# Patient Record
Sex: FEMALE | Race: OTHER | NOT HISPANIC OR LATINO | Employment: OTHER | URBAN - METROPOLITAN AREA
[De-identification: names, ages, dates, MRNs, and addresses within clinical notes are randomized per-mention and may not be internally consistent; named-entity substitution may affect disease eponyms.]

---

## 2019-02-12 ENCOUNTER — OFFICE VISIT (OUTPATIENT)
Dept: URGENT CARE | Facility: CLINIC | Age: 64
End: 2019-02-12
Payer: COMMERCIAL

## 2019-02-12 ENCOUNTER — APPOINTMENT (OUTPATIENT)
Dept: RADIOLOGY | Facility: IMAGING CENTER | Age: 64
End: 2019-02-12
Attending: FAMILY MEDICINE
Payer: COMMERCIAL

## 2019-02-12 VITALS
RESPIRATION RATE: 16 BRPM | BODY MASS INDEX: 27.6 KG/M2 | HEART RATE: 72 BPM | WEIGHT: 150 LBS | SYSTOLIC BLOOD PRESSURE: 132 MMHG | HEIGHT: 62 IN | DIASTOLIC BLOOD PRESSURE: 88 MMHG | OXYGEN SATURATION: 98 % | TEMPERATURE: 97.7 F

## 2019-02-12 DIAGNOSIS — T14.8XXA WOUND OF SKIN: ICD-10-CM

## 2019-02-12 PROCEDURE — 99214 OFFICE O/P EST MOD 30 MIN: CPT | Performed by: FAMILY MEDICINE

## 2019-02-12 PROCEDURE — 73140 X-RAY EXAM OF FINGER(S): CPT | Mod: TC,FY,RT | Performed by: FAMILY MEDICINE

## 2019-02-12 RX ORDER — DOXYCYCLINE HYCLATE 100 MG
100 TABLET ORAL EVERY 12 HOURS
Qty: 10 TAB | Refills: 0 | Status: SHIPPED | OUTPATIENT
Start: 2019-02-12 | End: 2019-02-17

## 2019-02-12 ASSESSMENT — ENCOUNTER SYMPTOMS
FOCAL WEAKNESS: 0
DIZZINESS: 0
FALLS: 0
CHILLS: 0
SPUTUM PRODUCTION: 0
COUGH: 0
SENSORY CHANGE: 1
FEVER: 0

## 2019-02-13 ENCOUNTER — TELEPHONE (OUTPATIENT)
Dept: HEALTH INFORMATION MANAGEMENT | Facility: OTHER | Age: 64
End: 2019-02-13

## 2019-02-13 NOTE — PROGRESS NOTES
"Subjective:      Funmilayo Egan is a 63 y.o. female who presents with Finger Injury (Today (L) ring finger/ smashed finger in door)      - This is a very pleasant, well and non-toxic appearing 63 y.o. female with complaints of after putting some groceries into her back seat of car she closed door and accidentally slammed her Rt middle finger. ~20 min ago           ALLERGIES:  Pcn [penicillins]     PMH:  Past Medical History:   Diagnosis Date   • Asthma     remote hx   • COPD     mild, not on meds for it   • Osteoporosis         PSH:  Past Surgical History:   Procedure Laterality Date   • ABDOMINAL HYSTERECTOMY TOTAL     • APPENDECTOMY         MEDS:    Current Outpatient Prescriptions:   •  doxycycline (VIBRAMYCIN) 100 MG Tab, Take 1 Tab by mouth every 12 hours for 5 days., Disp: 10 Tab, Rfl: 0  •  Risedronate Sodium (ACTONEL PO), Take 1 Tab by mouth every Monday., Disp: , Rfl:     ** I have documented what I find to be significant in regards to past medical, social, family and surgical history  in my HPI or under PMH/PSH/FH review section, otherwise it is contributory **           HPI    Review of Systems   Constitutional: Negative for chills and fever.   Respiratory: Negative for cough and sputum production.    Musculoskeletal: Positive for joint pain ( Rt ring finger ). Negative for falls.   Skin:        Wound Rt ring finger     Neurological: Positive for sensory change ( pain). Negative for dizziness and focal weakness.          Objective:     /88 (BP Location: Left arm, Patient Position: Sitting, BP Cuff Size: Adult)   Pulse 72   Temp 36.5 °C (97.7 °F) (Temporal)   Resp 16   Ht 1.575 m (5' 2\")   Wt 68 kg (150 lb)   SpO2 98%   BMI 27.44 kg/m²      Physical Exam   Constitutional: She appears well-developed. No distress.   HENT:   Head: Normocephalic and atraumatic.   Cardiovascular: Regular rhythm.    No murmur heard.  Pulmonary/Chest: Effort normal. No respiratory distress.   Musculoskeletal: Normal " range of motion. She exhibits edema, tenderness and deformity.   Rt ring finger: distal aspect w/ edema and bruising to fat pad. Superficial short lac over dorsal aspect. + TTP. Good flex/ext strength.    Neurological: She is alert. She exhibits normal muscle tone.   Skin: Skin is warm.   Psychiatric: She has a normal mood and affect. Judgment normal.   Nursing note and vitals reviewed.              Assessment/Plan:         1. Wound of skin  DX-FINGER(S) 2+ RIGHT    doxycycline (VIBRAMYCIN) 100 MG Tab       - RICE  - wound cleaned/dressed  - wound care discussed  - splinted  - 2 day wound check here       Dx & d/c instructions discussed w/ patient and/or family members.     ER precautions (worsening signs symptoms and when to go to ER) discussed.    Follow up w/ PCP in 2-3 days to make sure symptoms improving and no further intervention/treatment and/or work-up needed was advised, ER if feeling worse or not improving in 2 days.    Possible side effects (i.e. Rash, GI upset/constipation, sedation, elevation of BP or sugars) of any medications given discussed.     Patient left in stable condition

## 2019-02-14 ENCOUNTER — OFFICE VISIT (OUTPATIENT)
Dept: URGENT CARE | Facility: CLINIC | Age: 64
End: 2019-02-14
Payer: COMMERCIAL

## 2019-02-14 VITALS
DIASTOLIC BLOOD PRESSURE: 70 MMHG | OXYGEN SATURATION: 98 % | RESPIRATION RATE: 20 BRPM | HEART RATE: 78 BPM | SYSTOLIC BLOOD PRESSURE: 112 MMHG | TEMPERATURE: 98.2 F

## 2019-02-14 DIAGNOSIS — Z51.89 ENCOUNTER FOR WOUND RE-CHECK: ICD-10-CM

## 2019-02-14 PROCEDURE — 99024 POSTOP FOLLOW-UP VISIT: CPT | Performed by: NURSE PRACTITIONER

## 2019-02-15 NOTE — PROGRESS NOTES
Follow up laceration to the right ring finger from 2 days. Denies fever, chills, nausea, vomiting, diarrhea. Has been icing area. Has been tolerating antibiotic well.     Right ring finger with bruising and swelling. Decreased ROM.   Small healing laceration below nail bed.     Cleansed in office and applied Polysporin and bandaid. Finger splint given in office and discussed wound care instructions.     Continue antibiotic and follow up PRN.

## 2019-02-23 ENCOUNTER — OFFICE VISIT (OUTPATIENT)
Dept: URGENT CARE | Facility: CLINIC | Age: 64
End: 2019-02-23
Payer: COMMERCIAL

## 2019-02-23 VITALS
HEIGHT: 62 IN | WEIGHT: 151 LBS | TEMPERATURE: 98.6 F | HEART RATE: 84 BPM | DIASTOLIC BLOOD PRESSURE: 74 MMHG | RESPIRATION RATE: 16 BRPM | SYSTOLIC BLOOD PRESSURE: 124 MMHG | OXYGEN SATURATION: 98 % | BODY MASS INDEX: 27.79 KG/M2

## 2019-02-23 DIAGNOSIS — M79.644 PAIN OF FINGER OF RIGHT HAND: ICD-10-CM

## 2019-02-23 DIAGNOSIS — S61.214A LACERATION OF RIGHT RING FINGER WITHOUT FOREIGN BODY, NAIL DAMAGE STATUS UNSPECIFIED, INITIAL ENCOUNTER: Primary | ICD-10-CM

## 2019-02-23 DIAGNOSIS — S60.10XA SUBUNGUAL HEMATOMA OF FINGER, INITIAL ENCOUNTER: ICD-10-CM

## 2019-02-23 PROCEDURE — 99213 OFFICE O/P EST LOW 20 MIN: CPT | Performed by: NURSE PRACTITIONER

## 2019-02-23 ASSESSMENT — LIFESTYLE VARIABLES: SUBSTANCE_ABUSE: 0

## 2019-02-23 ASSESSMENT — ENCOUNTER SYMPTOMS
FEVER: 0
NAUSEA: 0
DIZZINESS: 0
CHILLS: 0

## 2019-02-24 NOTE — PROGRESS NOTES
"Subjective:      Funmilayo Egan is a 63 y.o. female who presents with Finger Pain (with hx of injury)    Denies past medical, surgical or family history that is significant to today's problem.   RX or OTC medications-- reviewed with patient today.   Allergies   Allergen Reactions   • Pcn [Penicillins]              HPI This is a new problem. Was seen in urgent care on 02/12 /19 for finger contusion  she has been using salt directly on the wound, washing it with hydrogen peroxide and putting  methialate  on her wound.     Review of Systems   Constitutional: Negative for chills, fever and malaise/fatigue.   Gastrointestinal: Negative for nausea.   Neurological: Negative for dizziness.   Endo/Heme/Allergies: Negative for environmental allergies.   Psychiatric/Behavioral: Negative for substance abuse.          Objective:     /74 (BP Location: Right arm, Patient Position: Sitting)   Pulse 84   Temp 37 °C (98.6 °F) (Temporal)   Resp 16   Ht 1.575 m (5' 2\")   Wt 68.5 kg (151 lb)   SpO2 98%   BMI 27.62 kg/m²      Physical Exam   Constitutional: She is oriented to person, place, and time. She appears well-developed and well-nourished.   HENT:   Head: Normocephalic.   Neck: Normal range of motion.   Pulmonary/Chest: Effort normal.   Musculoskeletal: Normal range of motion.        Hands:  Neurological: She is alert and oriented to person, place, and time.   Skin: Skin is warm. Capillary refill takes less than 2 seconds.   Psychiatric: She has a normal mood and affect. Her behavior is normal. Thought content normal.          Reviewed xray from 02/12/19 by me.     2/12/2019 7:14 PM    HISTORY/REASON FOR EXAM:  Right 4th digit pain..  .    TECHNIQUE/EXAM DESCRIPTION AND NUMBER OF VIEWS:  3 views of the RIGHT fingers.    COMPARISON: None    FINDINGS:  There is no evidence of acute fracture, dislocation, or radiopaque foreign body.    There is minimal degenerative change in the 3rd and 5th DIP joint. PIP joints and MCP " joints are maintained. Radiocarpal joint is maintained.   Impression       1.  Negative imaging of the right 4th digit.   Reading Provider Reading Date   Taylor Thomas M.D. Feb 12, 2019          Assessment/Plan:     1. Laceration of right ring finger without foreign body, nail damage status unspecified, initial encounter     2. Subungual hematoma of finger, initial encounter      Improving      3. Pain of finger of right hand           Epsom salt soaks. BID for 10 min.   Continue OTC triple antibiotic ointment w. bandaid  OTC age appropriate, analgesic of choice. Follow manufactures dosing and safety precautions.   FU prn    The patient is alerted to watch for any signs of infection (redness, pus, pain, increased swelling or fever) and call if such occurs. Home wound care instructions are provided. }.

## 2019-03-12 ENCOUNTER — OFFICE VISIT (OUTPATIENT)
Dept: MEDICAL GROUP | Facility: MEDICAL CENTER | Age: 64
End: 2019-03-12
Payer: COMMERCIAL

## 2019-03-12 VITALS
DIASTOLIC BLOOD PRESSURE: 84 MMHG | HEART RATE: 77 BPM | SYSTOLIC BLOOD PRESSURE: 126 MMHG | TEMPERATURE: 97.6 F | OXYGEN SATURATION: 97 % | BODY MASS INDEX: 27.62 KG/M2 | HEIGHT: 62 IN

## 2019-03-12 DIAGNOSIS — R07.89 CHEST PRESSURE: ICD-10-CM

## 2019-03-12 DIAGNOSIS — I10 ESSENTIAL HYPERTENSION: ICD-10-CM

## 2019-03-12 DIAGNOSIS — M81.0 AGE-RELATED OSTEOPOROSIS WITHOUT CURRENT PATHOLOGICAL FRACTURE: ICD-10-CM

## 2019-03-12 DIAGNOSIS — R53.83 OTHER FATIGUE: ICD-10-CM

## 2019-03-12 DIAGNOSIS — F41.9 ANXIETY: ICD-10-CM

## 2019-03-12 DIAGNOSIS — E78.00 PURE HYPERCHOLESTEROLEMIA: ICD-10-CM

## 2019-03-12 DIAGNOSIS — F63.3 HAIR PULLING: ICD-10-CM

## 2019-03-12 PROCEDURE — 99214 OFFICE O/P EST MOD 30 MIN: CPT | Performed by: FAMILY MEDICINE

## 2019-03-12 RX ORDER — RALOXIFENE HYDROCHLORIDE 60 MG/1
60 TABLET, FILM COATED ORAL DAILY
COMMUNITY
End: 2019-03-12 | Stop reason: SDUPTHER

## 2019-03-12 RX ORDER — ATENOLOL 100 MG/1
100 TABLET ORAL DAILY
COMMUNITY
End: 2019-03-12

## 2019-03-12 RX ORDER — TURMERIC 100 %
1 POWDER (GRAM) MISCELLANEOUS DAILY
COMMUNITY
End: 2022-12-29

## 2019-03-12 RX ORDER — ATENOLOL AND CHLORTHALIDONE TABLET 100; 25 MG/1; MG/1
1 TABLET ORAL DAILY
Qty: 90 TAB | Refills: 3 | Status: SHIPPED | OUTPATIENT
Start: 2019-03-12 | End: 2019-04-11

## 2019-03-12 RX ORDER — RALOXIFENE HYDROCHLORIDE 60 MG/1
60 TABLET, FILM COATED ORAL DAILY
Qty: 90 TAB | Refills: 3 | Status: SHIPPED | OUTPATIENT
Start: 2019-03-12 | End: 2020-05-18

## 2019-03-12 RX ORDER — ATENOLOL AND CHLORTHALIDONE TABLET 100; 25 MG/1; MG/1
1 TABLET ORAL DAILY
COMMUNITY
Start: 2019-03-12 | End: 2019-03-12 | Stop reason: SDUPTHER

## 2019-03-12 RX ORDER — CHOLECALCIFEROL (VITAMIN D3) 125 MCG
5000 CAPSULE ORAL DAILY
COMMUNITY
End: 2022-09-28

## 2019-03-12 ASSESSMENT — PATIENT HEALTH QUESTIONNAIRE - PHQ9: CLINICAL INTERPRETATION OF PHQ2 SCORE: 0

## 2019-03-12 NOTE — ASSESSMENT & PLAN NOTE
This is a long standing problem for this patient. zoloft seems to be helping. Significant improvement compared to prior.

## 2019-03-12 NOTE — ASSESSMENT & PLAN NOTE
This occurred 2 weeks ago. She states it was associated with palpitations, upper back pain and SOB. 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 She felt worried it was her heart but did not get seen by a doctor. She continued to mild dizziness the next day. She does state that she has been feeling stressed out lately. She has strong family history of heart disease. Mother: age 50's MI. Brother: heart disease, 50. The patient is very active (works as ) she sometimes feel SOB when working, but has never had chest pain or chest pain other than the one day.     Gets chest pressure a couple times per week at rest, takes her 's nitro which she thinks helps.

## 2019-03-12 NOTE — ASSESSMENT & PLAN NOTE
Currently on atenolol/chlorthalidone. No side effects  Last bun/cr and potassim done 12/2018: Cr: 0.83, K: 3.9, BUn: 23

## 2019-03-12 NOTE — ASSESSMENT & PLAN NOTE
Currently on raloxifene. Has been on this for 2 yrs. Last bone denisity study was last year at [facility].

## 2019-03-14 NOTE — PROGRESS NOTES
Subjective:     CC:  Diagnoses of Anxiety, Hair pulling, Essential hypertension, Age-related osteoporosis without current pathological fracture, Other fatigue, Chest pressure, and Pure hypercholesterolemia were pertinent to this visit.    HISTORY OF THE PRESENT ILLNESS: Patient is a 63 y.o. female who presents to the clinic today to establish care.    Hair pulling  This is a long standing problem for this patient. zoloft seems to be helping. Significant improvement compared to prior.  Still has some hair pulling from time to time.    Essential hypertension  Currently on atenolol/chlorthalidone. No side effects  Last bun/cr and potassim done 12/2018: Cr: 0.83, K: 3.9, BUN: 23  Blood pressure well controlled.    Age-related osteoporosis without current pathological fracture  Currently on raloxifene. Has been on this for 2 yrs. Last bone denisity study was last year at [facility] (the patient thinks).     Other fatigue  Feels fatigued for the past few weeks.     Chest pressure  This occurred 2 weeks ago. She states it was associated with palpitations, upper back pain and SOB. 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 She felt worried it was her heart but did not get seen by a doctor. She continued to have mild dizziness the next day. She does state that she has been feeling stressed out lately. She has a strong family history of heart disease. Mother: age 50's MI. Brother: heart disease, 50. The patient is very active (works as ) she sometimes feel SOB when working, but has never had chest pain or palpitations other than the one day while working.    During physical exam the patient confided that she gets chest pressure a couple times per week at rest, takes her 's nitro which she thinks helps.  She states that she has never told another physician in this.  This is been going on for months.      Allergies: Pcn [penicillins]    Current Outpatient Prescriptions Ordered in  "[facility]   Medication Sig Dispense Refill   • Turmeric Powder by Does not apply route.     • vitamin D (CHOLECALCIFEROL) 1000 UNIT Tab Take 1,000 Units by mouth every day.     • sertraline (ZOLOFT) 50 MG Tab Take 1 Tab by mouth every day. 90 Tab 3   • raloxifene (EVISTA) 60 MG Tab Take 1 Tab by mouth every day. 90 Tab 3   • atenolol-chlorthalidone (TENORETIC) 100-25 MG per tablet Take 1 Tab by mouth every day. 90 Tab 3     No current Epic-ordered facility-administered medications on file.        Past Medical History:   Diagnosis Date   • ASTHMA     remote hx   • COPD     mild, not on meds for it   • Hypertension    • OSTEOPOROSIS        Past Surgical History:   Procedure Laterality Date   • ABDOMINAL HYSTERECTOMY TOTAL     • APPENDECTOMY         Social History   Substance Use Topics   • Smoking status: Former Smoker   • Smokeless tobacco: Never Used      Comment: 20 y.a   • Alcohol use No       Social History     Social History Narrative   • No narrative on file       Family History   Problem Relation Age of Onset   • No Known Problems Mother    • Cancer Father        ROS:   Gen: no fevers/chills, no changes in weight  Eyes: no changes in vision  ENT: no sore throat, no hearing loss, no bloody nose  Pulm: no sob, no cough  CV: no chest pain, no palpitations  GI: no nausea/vomiting, no diarrhea  : no dysuria  MSk: no myalgias  Skin: no rash  Neuro: no headaches, no numbness/tingling  Heme/Lymph: no easy bruising      Objective:     Exam: Blood pressure 126/84, pulse 77, temperature 36.4 °C (97.6 °F), temperature source Temporal, height 1.575 m (5' 2\"), SpO2 97 %, not currently breastfeeding. Body mass index is 27.62 kg/m².    General: Normal appearing. No distress.  HEENT: conjunctiva clear, lids without ptosis, pupils equal  and reactive to light, ears normal shape and contour, canals are clear bilaterally, TMs clear, oropharynx is without erythema, edema or exudates.   Neck: Supple without masses. Thyroid is not " enlarged.  Pulmonary: Clear to ausculation.  Normal effort. No rales, ronchi, or wheezing.  Cardiovascular: Regular rate and rhythm, no murmur. No LE edema  Abdomen: Soft, nontender, nondistended. No masses or hernias noted.  Neurologic: Grossly normal, no focal deficits  Lymph: No cervical or supraclavicular lymph nodes are palpable  Skin: Warm and dry.  No obvious lesions.  Musculoskeletal: Normal gait and station.  Psych: Normal mood and affect. Alert and oriented x3. Judgment and insight is normal.      Labs: Reviewed labs from 12/20/2018    Assessment & Plan:   63 y.o. female with the following -    1. Anxiety  Chronic problem, somewhat well-controlled.  Currently on sertraline 50 mg daily.  Refill was placed for this today.  - sertraline (ZOLOFT) 50 MG Tab; Take 1 Tab by mouth every day.  Dispense: 90 Tab; Refill: 3    2. Hair pulling  Chronic problem, somewhat well-controlled.  Still having some hair pulling but significant improvement since starting the Zoloft.  Refill placed today.  - sertraline (ZOLOFT) 50 MG Tab; Take 1 Tab by mouth every day.  Dispense: 90 Tab; Refill: 3    3. Essential hypertension  Chronic problem this patient, well-controlled.  Currently on atenolol/chlorthalidone.  Refill placed for this today.  Recent BUN/creatinine 12/28/2018.  - atenolol-chlorthalidone (TENORETIC) 100-25 MG per tablet; Take 1 Tab by mouth every day.  Dispense: 90 Tab; Refill: 3    4. Age-related osteoporosis without current pathological fracture  Chronic problem, currently on raloxifene.  Refill placed for that today.  Patient is likely due for DEXA.  We have requested her prior DEXA scan.  - raloxifene (EVISTA) 60 MG Tab; Take 1 Tab by mouth every day.  Dispense: 90 Tab; Refill: 3    5. Other fatigue    - CBC WITH DIFFERENTIAL; Future  - Comp Metabolic Panel; Future  - TSH WITH REFLEX TO FT4; Future    6. Chest pressure  New problem.  Possibly due to anxiety however, concern for angina given that she is taking  her 's nitro and does find some relief with this.  EKG in office showed normal sinus rhythm today.  Referral to cardiology urgently as well as stress test ordered today.  She was strongly precautioned that if she has any further chest pain to present to the ER immediately.  - Cardiac Stress Test Treadmill Only; Future  - REFERRAL TO CARDIOLOGY    7. Pure hypercholesterolemia  Chronic problem.  Most recent lipid panel did show total cholesterol 256, to glycerides 183 and LDL of 157.  HDL was 62.  She should likely be on a statin, however, the patient would like to hold off for now.  We will rediscuss this at her next visit.    Return in about 4 weeks (around 4/9/2019).    Please note that this dictation was created using voice recognition software. I have made every reasonable attempt to correct obvious errors, but I expect that there are errors of grammar and possibly content that I did not discover before finalizing the note.

## 2019-04-08 ENCOUNTER — NON-PROVIDER VISIT (OUTPATIENT)
Dept: CARDIOLOGY | Facility: MEDICAL CENTER | Age: 64
End: 2019-04-08
Attending: FAMILY MEDICINE
Payer: COMMERCIAL

## 2019-04-08 VITALS
HEIGHT: 62 IN | DIASTOLIC BLOOD PRESSURE: 84 MMHG | SYSTOLIC BLOOD PRESSURE: 136 MMHG | OXYGEN SATURATION: 99 % | HEART RATE: 67 BPM | BODY MASS INDEX: 27.79 KG/M2 | WEIGHT: 151 LBS

## 2019-04-08 DIAGNOSIS — R07.89 CHEST PRESSURE: ICD-10-CM

## 2019-04-08 LAB — TREADMILL STRESS: NORMAL

## 2019-04-08 PROCEDURE — 93015 CV STRESS TEST SUPVJ I&R: CPT | Performed by: INTERNAL MEDICINE

## 2019-04-11 ENCOUNTER — OFFICE VISIT (OUTPATIENT)
Dept: CARDIOLOGY | Facility: MEDICAL CENTER | Age: 64
End: 2019-04-11
Payer: COMMERCIAL

## 2019-04-11 VITALS
BODY MASS INDEX: 26.13 KG/M2 | SYSTOLIC BLOOD PRESSURE: 122 MMHG | OXYGEN SATURATION: 94 % | DIASTOLIC BLOOD PRESSURE: 82 MMHG | HEIGHT: 62 IN | WEIGHT: 142 LBS | HEART RATE: 68 BPM

## 2019-04-11 DIAGNOSIS — R06.02 SHORTNESS OF BREATH: ICD-10-CM

## 2019-04-11 DIAGNOSIS — R00.2 PALPITATIONS: ICD-10-CM

## 2019-04-11 DIAGNOSIS — E78.5 DYSLIPIDEMIA: ICD-10-CM

## 2019-04-11 DIAGNOSIS — I10 ESSENTIAL HYPERTENSION: ICD-10-CM

## 2019-04-11 DIAGNOSIS — R07.89 CHEST DISCOMFORT: ICD-10-CM

## 2019-04-11 PROCEDURE — 99204 OFFICE O/P NEW MOD 45 MIN: CPT | Performed by: INTERNAL MEDICINE

## 2019-04-11 RX ORDER — METOPROLOL SUCCINATE 100 MG/1
100 TABLET, EXTENDED RELEASE ORAL DAILY
Qty: 30 TAB | Refills: 11 | Status: SHIPPED | OUTPATIENT
Start: 2019-04-11 | End: 2020-02-13

## 2019-04-11 RX ORDER — ZOSTER VACCINE RECOMBINANT, ADJUVANTED 50 MCG/0.5
KIT INTRAMUSCULAR
COMMUNITY
Start: 2019-04-10 | End: 2020-04-02

## 2019-04-11 ASSESSMENT — ENCOUNTER SYMPTOMS
DIAPHORESIS: 1
PALPITATIONS: 1
ABDOMINAL PAIN: 0
CHILLS: 0
DIZZINESS: 1
VOMITING: 0
EYE REDNESS: 1
BACK PAIN: 1
MYALGIAS: 0
NAUSEA: 1
BRUISES/BLEEDS EASILY: 1
SHORTNESS OF BREATH: 1
BLURRED VISION: 0
HEARTBURN: 1
NECK PAIN: 1
INSOMNIA: 1
HEADACHES: 1
DEPRESSION: 0
ORTHOPNEA: 1
FEVER: 0
LOSS OF CONSCIOUSNESS: 0
HEMOPTYSIS: 0
NERVOUS/ANXIOUS: 0
WEAKNESS: 1
WHEEZING: 0
EYE DISCHARGE: 0
EYE PAIN: 0
COUGH: 0
SPEECH CHANGE: 0

## 2019-04-11 NOTE — PROGRESS NOTES
Chief Complaint   Patient presents with   • Chest Pressure       Subjective:   Funmilayo Egan is a 63 y.o. female who presents today for new patient evaluation because of dyspnea, chest discomfort and palpitations.  She also has a history of hypertension and dyslipidemia.    Over the last year she is noted significant difficulty with dyspnea.  This is generally associated with some chest discomfort.  She feels the need to take a deep breath and this is associated with some chest tightness.  Is also associated with some lightheadedness.  She has any symptoms on a daily basis in the last hours.  They occur with and without exertion.  She also feels like she has to yawn.  She is noted no tingling.  She has some associated palpitations intermittently.    She has no dyspnea on exertion, PND, orthopnea, or edema    Past Medical History:   Diagnosis Date   • ASTHMA     remote hx   • COPD     mild, not on meds for it   • Hyperlipidemia    • Hypertension    • OSTEOPOROSIS      Past Surgical History:   Procedure Laterality Date   • ABDOMINAL HYSTERECTOMY TOTAL     • APPENDECTOMY       Family History   Problem Relation Age of Onset   • Heart Disease Mother 65   • Cancer Father    • Heart Disease Brother      Social History     Social History   • Marital status:      Spouse name: N/A   • Number of children: N/A   • Years of education: N/A     Occupational History   • Not on file.     Social History Main Topics   • Smoking status: Former Smoker   • Smokeless tobacco: Never Used      Comment: 20 y.a   • Alcohol use 2.4 oz/week     4 Glasses of wine per week   • Drug use: No   • Sexual activity: No     Other Topics Concern   • Not on file     Social History Narrative   • No narrative on file     Allergies   Allergen Reactions   • Pcn [Penicillins]      Outpatient Encounter Prescriptions as of 4/11/2019   Medication Sig Dispense Refill   • Turmeric Powder by Does not apply route.     • vitamin D (CHOLECALCIFEROL) 1000 UNIT  "Tab Take 1,000 Units by mouth every day.     • sertraline (ZOLOFT) 50 MG Tab Take 1 Tab by mouth every day. 90 Tab 3   • raloxifene (EVISTA) 60 MG Tab Take 1 Tab by mouth every day. 90 Tab 3   • atenolol-chlorthalidone (TENORETIC) 100-25 MG per tablet Take 1 Tab by mouth every day. 90 Tab 3   • SHINGRIX 50 MCG/0.5ML Recon Susp        No facility-administered encounter medications on file as of 4/11/2019.      Review of Systems   Constitutional: Positive for diaphoresis and malaise/fatigue. Negative for chills and fever.   HENT: Negative for congestion and ear discharge.    Eyes: Positive for redness. Negative for blurred vision, pain and discharge.   Respiratory: Positive for shortness of breath. Negative for cough, hemoptysis and wheezing.    Cardiovascular: Positive for chest pain, palpitations and orthopnea.   Gastrointestinal: Positive for heartburn and nausea. Negative for abdominal pain and vomiting.   Musculoskeletal: Positive for back pain and neck pain. Negative for joint pain and myalgias.   Skin: Negative for itching and rash.   Neurological: Positive for dizziness, weakness and headaches. Negative for speech change and loss of consciousness.   Endo/Heme/Allergies: Bruises/bleeds easily.   Psychiatric/Behavioral: Negative for depression. The patient has insomnia. The patient is not nervous/anxious.    All other systems reviewed and are negative.       Objective:   /82 (BP Location: Left arm, Patient Position: Sitting, BP Cuff Size: Adult)   Pulse 68   Ht 1.575 m (5' 2\")   Wt 64.4 kg (142 lb)   SpO2 94%   BMI 25.97 kg/m²     Physical Exam   Constitutional: She is oriented to person, place, and time. She appears well-developed and well-nourished. No distress.   HENT:   Head: Normocephalic and atraumatic.   Mouth/Throat: Mucous membranes are normal.   Neck: No JVD present. No thyromegaly present.   Cardiovascular: Normal rate, regular rhythm and intact distal pulses.  Exam reveals no gallop.    No " murmur heard.  Pulmonary/Chest: Effort normal and breath sounds normal. She has no rales.   Abdominal: Soft. There is no splenomegaly or hepatomegaly.   Musculoskeletal: Normal range of motion. She exhibits no edema.   Lymphadenopathy:     She has no cervical adenopathy.   Neurological: She is alert and oriented to person, place, and time. She has normal strength. She exhibits normal muscle tone.   Skin: Skin is warm and dry. No rash noted.   Psychiatric: She has a normal mood and affect. Her behavior is normal.     Laboratory from December 2018: Total cholesterol 256, triglycerides 183, HDL 62 and .  BUN 23 and creatinine 0.83    EKG from March 12, reviewed by myself: This shows a normal sinus rhythm with low voltage in the precordial leads only.  Is otherwise unremarkable.    Exercise tolerance test:  4/8/2019 4:43 PM    Resting EKG showed sinus rhythm with early transition but otherwise normal.  There was no ischemic changes on EKG during exercise or in recovery.    Provider conclusions and analysis:    Negative EKG stress test for ischemia at 10 minutes.  Normal exercise capacity.      Assessment:     1. Shortness of breath     2. Essential hypertension     3. Dyslipidemia     4. Chest discomfort     5. Palpitations         Medical Decision Making:  Today's Assessment / Status / Plan:     Ms. Egan is having symptoms which are consistent with anxiety/hyperventilation syndrome.  However, she does have increased cardiac risk factors and she will be risk stratified with a cardiac CT scan for calcium scoring.  We will also obtain an echocardiogram and change her atenolol to metoprolol  mg daily.  We will check a TSH and she will follow-up in about a month.

## 2019-04-11 NOTE — LETTER
[facility]   [address]   [address]  [place] [address]  Phone: [phone]  Fax: [phone]              Funmilayo Egan  1955    Encounter Date: 4/11/2019    Kyle Sarmiento M.D.          PROGRESS NOTE:  Chief Complaint   Patient presents with   • Chest Pressure       Subjective:   Funmilayo Egan is a 63 y.o. female who presents today for new patient evaluation because of dyspnea, chest discomfort and palpitations.  She also has a history of hypertension and dyslipidemia.    Over the last year she is noted significant difficulty with dyspnea.  This is generally associated with some chest discomfort.  She feels the need to take a deep breath and this is associated with some chest tightness.  Is also associated with some lightheadedness.  She has any symptoms on a daily basis in the last hours.  They occur with and without exertion.  She also feels like she has to yawn.  She is noted no tingling.  She has some associated palpitations intermittently.    She has no dyspnea on exertion, PND, orthopnea, or edema    Past Medical History:   Diagnosis Date   • ASTHMA     remote hx   • COPD     mild, not on meds for it   • Hyperlipidemia    • Hypertension    • OSTEOPOROSIS      Past Surgical History:   Procedure Laterality Date   • ABDOMINAL HYSTERECTOMY TOTAL     • APPENDECTOMY       Family History   Problem Relation Age of Onset   • Heart Disease Mother 65   • Cancer Father    • Heart Disease Brother      Social History     Social History   • Marital status:      Spouse name: N/A   • Number of children: N/A   • Years of education: N/A     Occupational History   • Not on file.     Social History Main Topics   • Smoking status: Former Smoker   • Smokeless tobacco: Never Used      Comment: 20 y.a   • Alcohol use 2.4 oz/week     4 Glasses of wine per week   • Drug use: No   • Sexual activity: No     Other Topics Concern   • Not on file     Social  "History Narrative   • No narrative on file     Allergies   Allergen Reactions   • Pcn [Penicillins]      Outpatient Encounter Prescriptions as of 4/11/2019   Medication Sig Dispense Refill   • Turmeric Powder by Does not apply route.     • vitamin D (CHOLECALCIFEROL) 1000 UNIT Tab Take 1,000 Units by mouth every day.     • sertraline (ZOLOFT) 50 MG Tab Take 1 Tab by mouth every day. 90 Tab 3   • raloxifene (EVISTA) 60 MG Tab Take 1 Tab by mouth every day. 90 Tab 3   • atenolol-chlorthalidone (TENORETIC) 100-25 MG per tablet Take 1 Tab by mouth every day. 90 Tab 3   • SHINGRIX 50 MCG/0.5ML Recon Susp        No facility-administered encounter medications on file as of 4/11/2019.      Review of Systems   Constitutional: Positive for diaphoresis and malaise/fatigue. Negative for chills and fever.   HENT: Negative for congestion and ear discharge.    Eyes: Positive for redness. Negative for blurred vision, pain and discharge.   Respiratory: Positive for shortness of breath. Negative for cough, hemoptysis and wheezing.    Cardiovascular: Positive for chest pain, palpitations and orthopnea.   Gastrointestinal: Positive for heartburn and nausea. Negative for abdominal pain and vomiting.   Musculoskeletal: Positive for back pain and neck pain. Negative for joint pain and myalgias.   Skin: Negative for itching and rash.   Neurological: Positive for dizziness, weakness and headaches. Negative for speech change and loss of consciousness.   Endo/Heme/Allergies: Bruises/bleeds easily.   Psychiatric/Behavioral: Negative for depression. The patient has insomnia. The patient is not nervous/anxious.    All other systems reviewed and are negative.       Objective:   /82 (BP Location: Left arm, Patient Position: Sitting, BP Cuff Size: Adult)   Pulse 68   Ht 1.575 m (5' 2\")   Wt 64.4 kg (142 lb)   SpO2 94%   BMI 25.97 kg/m²      Physical Exam   Constitutional: She is oriented to person, place, and time. She appears " well-developed and well-nourished. No distress.   HENT:   Head: Normocephalic and atraumatic.   Mouth/Throat: Mucous membranes are normal.   Neck: No JVD present. No thyromegaly present.   Cardiovascular: Normal rate, regular rhythm and intact distal pulses.  Exam reveals no gallop.    No murmur heard.  Pulmonary/Chest: Effort normal and breath sounds normal. She has no rales.   Abdominal: Soft. There is no splenomegaly or hepatomegaly.   Musculoskeletal: Normal range of motion. She exhibits no edema.   Lymphadenopathy:     She has no cervical adenopathy.   Neurological: She is alert and oriented to person, place, and time. She has normal strength. She exhibits normal muscle tone.   Skin: Skin is warm and dry. No rash noted.   Psychiatric: She has a normal mood and affect. Her behavior is normal.     Laboratory from December 2018: Total cholesterol 256, triglycerides 183, HDL 62 and .  BUN 23 and creatinine 0.83    EKG from March 12, reviewed by myself: This shows a normal sinus rhythm with low voltage in the precordial leads only.  Is otherwise unremarkable.    Exercise tolerance test:  4/8/2019 4:43 PM    Resting EKG showed sinus rhythm with early transition but otherwise normal.  There was no ischemic changes on EKG during exercise or in recovery.    Provider conclusions and analysis:    Negative EKG stress test for ischemia at 10 minutes.  Normal exercise capacity.      Assessment:     1. Shortness of breath     2. Essential hypertension     3. Dyslipidemia     4. Chest discomfort     5. Palpitations         Medical Decision Making:  Today's Assessment / Status / Plan:     Ms. Egan is having symptoms which are consistent with anxiety/hyperventilation syndrome.  However, she does have increased cardiac risk factors and she will be risk stratified with a cardiac CT scan for calcium scoring.  We will also obtain an echocardiogram and change her atenolol to metoprolol  mg daily.  We will check a TSH  and she will follow-up in about a month.      Marija Baker M.D.  [address]  [address]  [facility] [address]  VIA In Basket

## 2019-04-12 ENCOUNTER — OFFICE VISIT (OUTPATIENT)
Dept: MEDICAL GROUP | Facility: MEDICAL CENTER | Age: 64
End: 2019-04-12
Payer: COMMERCIAL

## 2019-04-12 VITALS
BODY MASS INDEX: 25.84 KG/M2 | DIASTOLIC BLOOD PRESSURE: 84 MMHG | HEIGHT: 62 IN | WEIGHT: 140.43 LBS | SYSTOLIC BLOOD PRESSURE: 138 MMHG | HEART RATE: 89 BPM | TEMPERATURE: 98 F | OXYGEN SATURATION: 95 %

## 2019-04-12 DIAGNOSIS — R07.89 CHEST DISCOMFORT: ICD-10-CM

## 2019-04-12 DIAGNOSIS — N39.41 URGE INCONTINENCE OF URINE: ICD-10-CM

## 2019-04-12 PROCEDURE — 99214 OFFICE O/P EST MOD 30 MIN: CPT | Performed by: FAMILY MEDICINE

## 2019-04-12 NOTE — ASSESSMENT & PLAN NOTE
This has been going on for about 1-1.5 yrs. Denies any painful urination. Denies any significant frequency. She does feel like she is emptying her bladder completely. She does not do any kegel exercises. Has not been sexually active for about 10 yrs with her .

## 2019-04-13 NOTE — PROGRESS NOTES
Subjective:     CC: Diagnoses of Urge incontinence of urine and Chest discomfort were pertinent to this visit.    HPI: Patient is a 63 y.o. female established patient who presents today to follow-up on chest pain as well as concern for incontinence.      Urge incontinence of urine  This has been going on for about 1-1.5 yrs. Denies any painful urination. Denies any significant frequency. She does feel like she is emptying her bladder completely. She does not do any kegel exercises. Has not been sexually active for about 10 yrs with her .  She states the problem happens daily, she reports that she has no warning, she will have a slight urge to urinate and then often is unable to get to the bathroom in time.    Chest discomfort  Treadmill stress test was negative.  The patient still has an echocardiogram ordered.  Her EKG with cardiology looked okay.  They did switch her blood pressure medication to metoprolol.      Past Medical History:   Diagnosis Date   • ASTHMA     remote hx   • COPD     mild, not on meds for it   • Hyperlipidemia    • Hypertension    • OSTEOPOROSIS        Social History   Substance Use Topics   • Smoking status: Former Smoker   • Smokeless tobacco: Never Used      Comment: 20 y.a   • Alcohol use 2.4 oz/week     4 Glasses of wine per week       Current Outpatient Prescriptions Ordered in [facility]   Medication Sig Dispense Refill   • metoprolol SR (TOPROL XL) 100 MG TABLET SR 24 HR Take 1 Tab by mouth every day. 30 Tab 11   • Turmeric Powder by Does not apply route.     • vitamin D (CHOLECALCIFEROL) 1000 UNIT Tab Take 1,000 Units by mouth every day.     • sertraline (ZOLOFT) 50 MG Tab Take 1 Tab by mouth every day. 90 Tab 3   • raloxifene (EVISTA) 60 MG Tab Take 1 Tab by mouth every day. 90 Tab 3   • SHINGRIX 50 MCG/0.5ML Recon Susp        No current Epic-ordered facility-administered medications on file.        Allergies:  Pcn [penicillins]    Health Maintenance: Completed    ROS:  Gen: no  "fevers/chill, no changes in weight  Eyes: no changes in vision  ENT: no sore throat, no hearing loss, no bloody nose  Pulm: no sob, no cough  CV: no chest pain, no palpitations  GI: no nausea/vomiting, no diarrhea  : no dysuria  MSk: no myalgias  Skin: no rash  Neuro: no headaches, no numbness/tingling  Heme/Lymph: no easy bruising      Objective:       Exam:  /84 (BP Location: Left arm, Patient Position: Sitting, BP Cuff Size: Adult)   Pulse 89   Temp 36.7 °C (98 °F) (Temporal)   Ht 1.575 m (5' 2\")   Wt 63.7 kg (140 lb 6.9 oz)   SpO2 95%   BMI 25.69 kg/m²  Body mass index is 25.69 kg/m².    General: Normal appearing. No distress.  HEAD: NCAT  EYES: conjunctiva clear, lids without ptosis, pupils equal and reactive to light  EARS: ears normal shape and contour.  MOUTH: normal dentition   Neck:  Normal ROM  Pulmonary: Normal effort. Normal respiratory rate.  Cardiovascular: Well perfused. No LE edema  Neurologic: Grossly normal, no focal deficits  Skin: Warm and dry.  No obvious lesions.  Musculoskeletal: Normal gait and station.   Psych: Normal mood and affect. Alert and oriented x3. Judgment and insight is normal. EXAM    Assessment & Plan:     63 y.o. female with the following -     1. Urge incontinence of urine  New problem.  Sounds like urge incontinence on history.  I did order a urinalysis to ensure that she has no infection.  I discussed with the patient some preventive measures such as voiding every 2-3 hours scheduled, avoiding coffee.  We also discussed Keagle exercises.  She is interested in pelvic floor physical therapy.  I sent a referral for this.  - URINALYSIS,CULTURE IF INDICATED; Future  - REFERRAL TO PHYSICAL THERAPY Reason for Therapy: Eval/Treat/Report    2. Chest discomfort  Chronic problem, well-controlled.  The patient is being seen by cardiology.  She still has an echocardiogram scheduled, and she plans to get this done.  I also strongly urged her to get her labs done that we had " ordered at her panel, CBC and thyroid.  She states that she will get this done.  I did print the lab order for her again today.      Please note that this dictation was created using voice recognition software. I have made every reasonable attempt to correct obvious errors, but I expect that there are errors of grammar and possibly content that I did not discover before finalizing the note.

## 2019-04-15 ENCOUNTER — HOSPITAL ENCOUNTER (OUTPATIENT)
Dept: RADIOLOGY | Facility: MEDICAL CENTER | Age: 64
End: 2019-04-15
Attending: FAMILY MEDICINE
Payer: COMMERCIAL

## 2019-04-15 DIAGNOSIS — Z12.31 SCREENING MAMMOGRAM, ENCOUNTER FOR: ICD-10-CM

## 2019-04-15 PROCEDURE — 77063 BREAST TOMOSYNTHESIS BI: CPT

## 2019-04-16 LAB
ALBUMIN SERPL-MCNC: 4.2 G/DL (ref 3.6–4.8)
ALBUMIN/GLOB SERPL: 2.2 {RATIO} (ref 1.2–2.2)
ALP SERPL-CCNC: 35 IU/L (ref 39–117)
ALT SERPL-CCNC: 21 IU/L (ref 0–32)
AST SERPL-CCNC: 17 IU/L (ref 0–40)
BASOPHILS # BLD AUTO: 0 X10E3/UL (ref 0–0.2)
BASOPHILS NFR BLD AUTO: 0 %
BILIRUB SERPL-MCNC: 0.3 MG/DL (ref 0–1.2)
BUN SERPL-MCNC: 17 MG/DL (ref 8–27)
BUN/CREAT SERPL: 24 (ref 12–28)
CALCIUM SERPL-MCNC: 9.5 MG/DL (ref 8.7–10.3)
CHLORIDE SERPL-SCNC: 106 MMOL/L (ref 96–106)
CO2 SERPL-SCNC: 26 MMOL/L (ref 20–29)
CREAT SERPL-MCNC: 0.7 MG/DL (ref 0.57–1)
EOSINOPHIL # BLD AUTO: 0.2 X10E3/UL (ref 0–0.4)
EOSINOPHIL NFR BLD AUTO: 3 %
ERYTHROCYTE [DISTWIDTH] IN BLOOD BY AUTOMATED COUNT: 13.5 % (ref 12.3–15.4)
GLOBULIN SER CALC-MCNC: 1.9 G/DL (ref 1.5–4.5)
GLUCOSE SERPL-MCNC: 101 MG/DL (ref 65–99)
HCT VFR BLD AUTO: 42.8 % (ref 34–46.6)
HGB BLD-MCNC: 14.6 G/DL (ref 11.1–15.9)
IMM GRANULOCYTES # BLD AUTO: 0 X10E3/UL (ref 0–0.1)
IMM GRANULOCYTES NFR BLD AUTO: 0 %
IMMATURE CELLS  115398: NORMAL
LYMPHOCYTES # BLD AUTO: 1.9 X10E3/UL (ref 0.7–3.1)
LYMPHOCYTES NFR BLD AUTO: 41 %
MCH RBC QN AUTO: 31.2 PG (ref 26.6–33)
MCHC RBC AUTO-ENTMCNC: 34.1 G/DL (ref 31.5–35.7)
MCV RBC AUTO: 92 FL (ref 79–97)
MONOCYTES # BLD AUTO: 0.6 X10E3/UL (ref 0.1–0.9)
MONOCYTES NFR BLD AUTO: 12 %
MORPHOLOGY BLD-IMP: NORMAL
NEUTROPHILS # BLD AUTO: 2.1 X10E3/UL (ref 1.4–7)
NEUTROPHILS NFR BLD AUTO: 44 %
NRBC BLD AUTO-RTO: NORMAL %
PLATELET # BLD AUTO: 220 X10E3/UL (ref 150–379)
POTASSIUM SERPL-SCNC: 4.4 MMOL/L (ref 3.5–5.2)
PROT SERPL-MCNC: 6.1 G/DL (ref 6–8.5)
RBC # BLD AUTO: 4.68 X10E6/UL (ref 3.77–5.28)
SODIUM SERPL-SCNC: 144 MMOL/L (ref 134–144)
TSH SERPL DL<=0.005 MIU/L-ACNC: 2.72 UIU/ML (ref 0.45–4.5)
WBC # BLD AUTO: 4.8 X10E3/UL (ref 3.4–10.8)

## 2019-04-17 ENCOUNTER — HOSPITAL ENCOUNTER (OUTPATIENT)
Dept: RADIOLOGY | Facility: MEDICAL CENTER | Age: 64
End: 2019-04-17

## 2019-04-17 ENCOUNTER — OFFICE VISIT (OUTPATIENT)
Dept: URGENT CARE | Facility: MEDICAL CENTER | Age: 64
End: 2019-04-17
Payer: COMMERCIAL

## 2019-04-17 VITALS
HEIGHT: 62 IN | RESPIRATION RATE: 16 BRPM | HEART RATE: 86 BPM | TEMPERATURE: 97.6 F | OXYGEN SATURATION: 96 % | BODY MASS INDEX: 25.76 KG/M2 | SYSTOLIC BLOOD PRESSURE: 132 MMHG | DIASTOLIC BLOOD PRESSURE: 80 MMHG | WEIGHT: 140 LBS

## 2019-04-17 DIAGNOSIS — L08.9 INFECTED FINGER: ICD-10-CM

## 2019-04-17 PROCEDURE — 99214 OFFICE O/P EST MOD 30 MIN: CPT | Performed by: PHYSICIAN ASSISTANT

## 2019-04-17 RX ORDER — SULFAMETHOXAZOLE AND TRIMETHOPRIM 800; 160 MG/1; MG/1
1 TABLET ORAL 2 TIMES DAILY
Qty: 14 TAB | Refills: 0 | Status: SHIPPED | OUTPATIENT
Start: 2019-04-17 | End: 2019-04-24

## 2019-04-20 ENCOUNTER — OFFICE VISIT (OUTPATIENT)
Dept: URGENT CARE | Facility: MEDICAL CENTER | Age: 64
End: 2019-04-20
Payer: COMMERCIAL

## 2019-04-20 VITALS
HEIGHT: 62 IN | BODY MASS INDEX: 26.68 KG/M2 | OXYGEN SATURATION: 96 % | HEART RATE: 79 BPM | SYSTOLIC BLOOD PRESSURE: 118 MMHG | WEIGHT: 145 LBS | DIASTOLIC BLOOD PRESSURE: 74 MMHG | TEMPERATURE: 97.5 F

## 2019-04-20 DIAGNOSIS — L03.011 CELLULITIS OF FINGER OF RIGHT HAND: ICD-10-CM

## 2019-04-20 PROCEDURE — 99213 OFFICE O/P EST LOW 20 MIN: CPT | Performed by: NURSE PRACTITIONER

## 2019-04-20 ASSESSMENT — ENCOUNTER SYMPTOMS
CHILLS: 0
FEVER: 0

## 2019-04-21 NOTE — PROGRESS NOTES
Subjective:      Funmilayo Egan is a 63 y.o. female who presents with Finger Pain (x2 weeks R ring finger injury; possible infection)    Past Medical History:   Diagnosis Date   • ASTHMA     remote hx   • COPD     mild, not on meds for it   • Hyperlipidemia    • Hypertension    • OSTEOPOROSIS      Social History     Social History   • Marital status:      Spouse name: N/A   • Number of children: N/A   • Years of education: N/A     Occupational History   • Not on file.     Social History Main Topics   • Smoking status: Former Smoker   • Smokeless tobacco: Never Used      Comment: 20 y.a   • Alcohol use 2.4 oz/week     4 Glasses of wine per week   • Drug use: No   • Sexual activity: No     Other Topics Concern   • Not on file     Social History Narrative   • No narrative on file     Family History   Problem Relation Age of Onset   • Heart Disease Mother 65   • Cancer Father    • Heart Disease Brother        Allergies: Pcn [penicillins]    Patient is a 63-year-old female who presents today for complaint of pain to the right ring finger that radiates into the hand and arm.  She was seen in office earlier this week for cellulitis.  Her initial injury occurred with this on February 12.  X-rays were negative.  No further injuries, but patient believed that she was developing cellulitis and came in to be seen.  She was placed on Bactrim DS which she is tolerating well.  She states that overall her finger does look much better, but she is concerned because she is having some pain into the wrist and forearm area.  No known fevers.          Other   This is a new problem. The current episode started in the past 7 days. The problem occurs constantly. Pertinent negatives include no chills or fever. Nothing aggravates the symptoms. Treatments tried: bactrim DS. The treatment provided moderate relief.       Review of Systems   Constitutional: Positive for malaise/fatigue. Negative for chills and fever.   Musculoskeletal:    "  Pain in the right ring finger radiating into the hand and arm   All other systems reviewed and are negative.         Objective:     /74   Pulse 79   Temp 36.4 °C (97.5 °F) (Temporal)   Ht 1.575 m (5' 2\")   Wt 65.8 kg (145 lb)   SpO2 96%   BMI 26.52 kg/m²      Physical Exam   Constitutional: She appears well-developed and well-nourished.   Musculoskeletal:        Arms:  There is slight amount of redness over the distal dorsal aspect of the right ring finger.  No streaking.  No obvious soft tissue swelling.  No pain along the path of the tendon either dorsally or on the palmar aspect.  No pain along the path of the tendon on the forearm.  There is no lymphadenopathy or pain in the right axilla.     Skin: Skin is warm and dry. Capillary refill takes less than 2 seconds.   Psychiatric: She has a normal mood and affect. Her behavior is normal. Judgment and thought content normal.   Vitals reviewed.      Consideration of IM rocephin made here in the office; however patient is highly allergic to penicillin and states that a prior allergic reaction had required hospitalization. As such giving rocephin may also risk an allergic reaction and it was not given.  At this time, I do not see any symptoms suggestive of sepsis.  There is no streaking or lymphadenitis.  Vital signs are within normal limits.  At this time, since the initial injury occurred on February 12, and patient feels that this is never completely resolved, I will refer her to orthopedics for follow-up.  I will also order a CBC to check patient's white blood cell count.  I have also given the patient strict precautions to go to the emergency room if pain increases, if she begins to have redness or streaking, axillary pain, or fever at any time.  Patient verbalized understanding and agreement with plan of care.            Assessment/Plan:   Celluliits, finger    Continue present antibiotic  Referral to orthopedics given-  CBC; will call " results  Strict ER precautions for worsening of symptoms; see note above.   There are no diagnoses linked to this encounter.

## 2019-04-23 ASSESSMENT — ENCOUNTER SYMPTOMS
PALPITATIONS: 0
CHILLS: 0
SHORTNESS OF BREATH: 0
TINGLING: 0
SORE THROAT: 0
NAUSEA: 0
VOMITING: 0
FEVER: 0
BLURRED VISION: 0
SENSORY CHANGE: 0

## 2019-04-23 NOTE — PROGRESS NOTES
Subjective:      Funmilayo Egan is a 63 y.o. female who presents with Wound Infection ((R) hand ring finger ingection, x 2 months, pain shoots up hand and arm )      HPI:  Funmilayo Egan is a 63 y.o. female who presents today for evaluation of a right finger infection. Patient was initially seen and evaluated for this back in February after slamming a car door on her right ring finger.  At that time she had an x-ray done which is unremarked was placed on a course of doxycycline.  She said that everything cleared up but has not been bothering her until just a few days ago when she started to notice pain and redness of her right ring finger again.  She denies any new injury.  She denies fever/chills or numbness/tingling.        Review of Systems   Constitutional: Negative for chills and fever.   HENT: Negative for sore throat.    Eyes: Negative for blurred vision.   Respiratory: Negative for shortness of breath.    Cardiovascular: Negative for chest pain and palpitations.   Gastrointestinal: Negative for nausea and vomiting.   Musculoskeletal: Positive for joint pain (Pain of right ring finger).   Neurological: Negative for tingling and sensory change.       PMH:  has a past medical history of ASTHMA; COPD; Hyperlipidemia; Hypertension; and OSTEOPOROSIS.  MEDS:   Current Outpatient Prescriptions:   •  sulfamethoxazole-trimethoprim (BACTRIM DS) 800-160 MG tablet, Take 1 Tab by mouth 2 times a day for 7 days., Disp: 14 Tab, Rfl: 0  •  SHINGRIX 50 MCG/0.5ML Recon Susp, , Disp: , Rfl:   •  metoprolol SR (TOPROL XL) 100 MG TABLET SR 24 HR, Take 1 Tab by mouth every day., Disp: 30 Tab, Rfl: 11  •  Turmeric Powder, by Does not apply route., Disp: , Rfl:   •  vitamin D (CHOLECALCIFEROL) 1000 UNIT Tab, Take 1,000 Units by mouth every day., Disp: , Rfl:   •  sertraline (ZOLOFT) 50 MG Tab, Take 1 Tab by mouth every day., Disp: 90 Tab, Rfl: 3  •  raloxifene (EVISTA) 60 MG Tab, Take 1 Tab by mouth every day., Disp: 90 Tab, Rfl:  "3  ALLERGIES:   Allergies   Allergen Reactions   • Pcn [Penicillins]      SURGHX:   Past Surgical History:   Procedure Laterality Date   • ABDOMINAL HYSTERECTOMY TOTAL     • APPENDECTOMY       SOCHX:  reports that she has quit smoking. She has never used smokeless tobacco. She reports that she drinks about 2.4 oz of alcohol per week . She reports that she does not use drugs.  FH: Family history was reviewed, no pertinent findings to report     Objective:     /80   Pulse 86   Temp 36.4 °C (97.6 °F)   Resp 16   Ht 1.575 m (5' 2\")   Wt 63.5 kg (140 lb)   SpO2 96%   BMI 25.61 kg/m²      Physical Exam   Constitutional: She is oriented to person, place, and time. She appears well-developed and well-nourished.   HENT:   Head: Normocephalic and atraumatic.   Right Ear: External ear normal.   Left Ear: External ear normal.   Eyes: Pupils are equal, round, and reactive to light. Conjunctivae are normal.   Cardiovascular: Normal rate, regular rhythm and normal heart sounds.    No murmur heard.  Pulmonary/Chest: Effort normal and breath sounds normal. She has no wheezes.   Musculoskeletal:        Hands:  Neurological: She is alert and oriented to person, place, and time.   Skin: Skin is warm and dry. Capillary refill takes less than 2 seconds.   Psychiatric: She has a normal mood and affect. Her behavior is normal. Judgment normal.          Assessment/Plan:     1. Infected finger  - sulfamethoxazole-trimethoprim (BACTRIM DS) 800-160 MG tablet; Take 1 Tab by mouth 2 times a day for 7 days.  Dispense: 14 Tab; Refill: 0  -Right ring finger appears mildly infected.  Application on a course of Bactrim.  Advised her to follow-up with us in 2 to 3 days for wound check to make sure that it is resolving appropriately.        Differential Diagnosis, natural history, and supportive care discussed. Return to the Urgent Care or follow up with your PCP if symptoms fail to resolve, or for any new or worsening symptoms. Emergency " room precautions discussed. Patient and/or family appears understanding of information.

## 2019-04-26 ENCOUNTER — OFFICE VISIT (OUTPATIENT)
Dept: MEDICAL GROUP | Facility: MEDICAL CENTER | Age: 64
End: 2019-04-26
Payer: COMMERCIAL

## 2019-04-26 ENCOUNTER — HOSPITAL ENCOUNTER (OUTPATIENT)
Facility: MEDICAL CENTER | Age: 64
End: 2019-04-26
Attending: FAMILY MEDICINE
Payer: COMMERCIAL

## 2019-04-26 VITALS
DIASTOLIC BLOOD PRESSURE: 64 MMHG | SYSTOLIC BLOOD PRESSURE: 116 MMHG | HEART RATE: 72 BPM | BODY MASS INDEX: 26.69 KG/M2 | OXYGEN SATURATION: 96 % | HEIGHT: 62 IN | TEMPERATURE: 97.6 F | WEIGHT: 145.06 LBS

## 2019-04-26 DIAGNOSIS — E78.5 DYSLIPIDEMIA: ICD-10-CM

## 2019-04-26 DIAGNOSIS — Z00.00 WELL ADULT EXAM: ICD-10-CM

## 2019-04-26 PROCEDURE — 88175 CYTOPATH C/V AUTO FLUID REDO: CPT

## 2019-04-26 PROCEDURE — 87624 HPV HI-RISK TYP POOLED RSLT: CPT

## 2019-04-26 PROCEDURE — 99396 PREV VISIT EST AGE 40-64: CPT | Performed by: FAMILY MEDICINE

## 2019-04-26 NOTE — PROGRESS NOTES
CC:   Chief Complaint   Patient presents with   • Annual Exam       HPI:   Funmilayo Egan is a 63 y.o. female who presents for annual exam. She is feeling well and denies any complaints.    Ob-Gyn/ History:    Patient has GYN provider: no  /Para:  0/0  Last Pap Smear:  3 yrs ago. No history of abnormal pap smears.  Gyn Surgery:  Yes, hysterectomy 30 yrs ago, unsure if cervix is still present.  Last menstrual period:  30 yrs ago.    No significant bloating/fluid retention, pelvic pain, or dyspareunia. No vaginal discharge  Post-menopausal bleeding: No  Urinary incontinence: yes, improved as she is scheduling her urination.  She is not sexually active, her  is ill.    Health Maintenance  Cholesterol Screening: about 6 months ago, does not have records.    Diabetes Screenin/15/19, fasting glucose 101   Aspirin Use: No    Diet: Good   Exercise: Regularly   Substance Abuse: N/a     Sun protection used.    Cancer screening  Colorectal Cancer Screening: last colonoscopy 1 yr ago, told to repeat in 3 yrs   Lung Cancer Screening: non-smoker    Cervical Cancer Screening: Last pap about 3 yrs ago, unclear if patient has cervix.    Breast Cancer Screening: up to date on mamm, last mammo 4/15/19    Infectious disease screening/Immunizations  --HIV Screening: never   --Hepatitis C Screening: never   --Immunizations:    Influenza: up to date    Tetanus: up to date    Shingles: up to date      She  has a past medical history of ASTHMA; COPD; Hyperlipidemia; Hypertension; and OSTEOPOROSIS.  She  has a past surgical history that includes abdominal hysterectomy total and appendectomy.    Family History   Problem Relation Age of Onset   • Heart Disease Mother 65   • Cancer Father    • Heart Disease Brother        Social History     Social History   • Marital status:      Spouse name: N/A   • Number of children: N/A   • Years of education: N/A     Occupational History   • Not on file.     Social History Main  "Topics   • Smoking status: Former Smoker   • Smokeless tobacco: Never Used      Comment: 20 y.a   • Alcohol use 2.4 oz/week     4 Glasses of wine per week   • Drug use: No   • Sexual activity: No     Other Topics Concern   • Not on file     Social History Narrative   • No narrative on file       Patient Active Problem List    Diagnosis Date Noted   • Urge incontinence of urine 04/12/2019   • Shortness of breath 04/11/2019   • Palpitations 04/11/2019   • Anxiety 03/12/2019   • Hair pulling 03/12/2019   • Essential hypertension 03/12/2019   • Age-related osteoporosis without current pathological fracture 03/12/2019   • Other fatigue 03/12/2019   • Chest discomfort 03/12/2019   • Dyslipidemia 03/12/2019         Current Outpatient Prescriptions   Medication Sig Dispense Refill   • metoprolol SR (TOPROL XL) 100 MG TABLET SR 24 HR Take 1 Tab by mouth every day. 30 Tab 11   • Turmeric Powder by Does not apply route.     • vitamin D (CHOLECALCIFEROL) 1000 UNIT Tab Take 1,000 Units by mouth every day.     • sertraline (ZOLOFT) 50 MG Tab Take 1 Tab by mouth every day. 90 Tab 3   • raloxifene (EVISTA) 60 MG Tab Take 1 Tab by mouth every day. 90 Tab 3   • SHINGRIX 50 MCG/0.5ML Recon Susp        No current facility-administered medications for this visit.      Allergies   Allergen Reactions   • Pcn [Penicillins]          Objective:     /64 (BP Location: Left arm, Patient Position: Sitting, BP Cuff Size: Adult)   Pulse 72   Temp 36.4 °C (97.6 °F) (Temporal)   Ht 1.575 m (5' 2\")   Wt 65.8 kg (145 lb 1 oz)   SpO2 96%   BMI 26.53 kg/m²   Body mass index is 26.53 kg/m².  Wt Readings from Last 4 Encounters:   04/26/19 65.8 kg (145 lb 1 oz)   04/20/19 65.8 kg (145 lb)   04/17/19 63.5 kg (140 lb)   04/12/19 63.7 kg (140 lb 6.9 oz)       Physical Exam:  Constitutional: Well-developed and well-nourished. Not diaphoretic. No distress.   Skin: Skin is warm and dry. No rash noted.  Eyes: Conjunctivae and extraocular motions are " normal. YUNG. No scleral icterus.   Ears:  External ears unremarkable. Tympanic membranes and canals clear and intact.  Nose: Nares patent. No discharge.   Mouth/Throat: Dentition is normal. Tongue normal. Oropharynx is clear and moist, without erythema or exudates.  Neck: Supple, normal range of motion. No thyromegaly present. No lymphadenopathy--cervical or supraclavicular.  Cardiovascular: Regular rate and rhythm, S1 and S2 without murmur, rubs, or gallops. No LE edema.    Lungs: Normal inspiratory effort, CTA bilaterally, no wheezes/rhonchi/rales  Breasts: Bilaterally symmetrical, no nipple discharge, no skin changes or dimpling are  noted.  Bilateral well-healed breast reduction scars.  Both breasts are free of palpable pathology and the axillae are free of lymphadenopathy.  Abdomen: Soft, non tender, and without distention. No rebound or guarding. No hernia noted.  Pelvic exam:  Perineum: No external lesions are noted, color is symmetrical throughout  Vagina: Vaginal vault is tight, friable, absent rugation.  Cervix: No cervix is visible.    Uterus:Surgically Absent  Bimanual: no adnexal masses or tenderness    Pap was performed and sent to the lab  Musculoskeletal: Normal gait and station.  Neurological: Alert and oriented x 3, no focal deficits noted.   Psychiatric:  Behavior, mood, and affect are appropriate.    Assessment and Plan:     1. Well adult exam  HEP C VIRUS ANTIBODY   2. Dyslipidemia  Lipid Profile       HCM:     Labs per orders  Up-to-date on immunizations  Patient counseled about skin care, diet, supplements, and exercise.    Follow-up: Return in about 1 year (around 4/26/2020).    Please note that this dictation was created using voice recognition software. I have made every reasonable attempt to correct obvious errors, but I expect that there are errors of grammar and possibly content that I did not discover before finalizing the note.

## 2019-04-27 DIAGNOSIS — Z00.00 WELL ADULT EXAM: ICD-10-CM

## 2019-04-28 ENCOUNTER — HOSPITAL ENCOUNTER (OUTPATIENT)
Dept: RADIOLOGY | Facility: MEDICAL CENTER | Age: 64
End: 2019-04-28
Attending: INTERNAL MEDICINE

## 2019-04-28 DIAGNOSIS — I10 ESSENTIAL HYPERTENSION: ICD-10-CM

## 2019-04-28 DIAGNOSIS — E78.5 DYSLIPIDEMIA: ICD-10-CM

## 2019-04-28 PROCEDURE — 4410556 CT-CARDIAC SCORING

## 2019-04-30 LAB
CYTOLOGY REG CYTOL: NORMAL
HPV HR 12 DNA CVX QL NAA+PROBE: NEGATIVE
HPV16 DNA SPEC QL NAA+PROBE: NEGATIVE
HPV18 DNA SPEC QL NAA+PROBE: NEGATIVE
SPECIMEN SOURCE: NORMAL

## 2019-05-09 ENCOUNTER — HOSPITAL ENCOUNTER (OUTPATIENT)
Dept: CARDIOLOGY | Facility: MEDICAL CENTER | Age: 64
End: 2019-05-09
Attending: INTERNAL MEDICINE
Payer: COMMERCIAL

## 2019-05-09 DIAGNOSIS — R00.2 PALPITATIONS: ICD-10-CM

## 2019-05-09 DIAGNOSIS — I10 ESSENTIAL HYPERTENSION: ICD-10-CM

## 2019-05-09 DIAGNOSIS — R07.89 CHEST DISCOMFORT: ICD-10-CM

## 2019-05-09 DIAGNOSIS — R06.02 SHORTNESS OF BREATH: ICD-10-CM

## 2019-05-09 LAB
LV EJECT FRACT MOD 2C 99903: 66.28
LV EJECT FRACT MOD 4C 99902: 69.14
LV EJECT FRACT MOD BP 99901: 66.82

## 2019-05-09 PROCEDURE — 93306 TTE W/DOPPLER COMPLETE: CPT

## 2019-05-09 PROCEDURE — 93306 TTE W/DOPPLER COMPLETE: CPT | Mod: 26 | Performed by: INTERNAL MEDICINE

## 2019-05-10 ENCOUNTER — HOSPITAL ENCOUNTER (OUTPATIENT)
Dept: LAB | Facility: MEDICAL CENTER | Age: 64
End: 2019-05-10
Attending: FAMILY MEDICINE
Payer: COMMERCIAL

## 2019-05-10 DIAGNOSIS — E78.5 DYSLIPIDEMIA: ICD-10-CM

## 2019-05-10 DIAGNOSIS — R53.83 OTHER FATIGUE: ICD-10-CM

## 2019-05-10 DIAGNOSIS — Z00.00 WELL ADULT EXAM: ICD-10-CM

## 2019-05-10 DIAGNOSIS — N39.41 URGE INCONTINENCE OF URINE: ICD-10-CM

## 2019-05-10 LAB
APPEARANCE UR: CLEAR
BACTERIA #/AREA URNS HPF: NEGATIVE /HPF
BASOPHILS # BLD AUTO: 0.3 % (ref 0–1.8)
BASOPHILS # BLD: 0.01 K/UL (ref 0–0.12)
BILIRUB UR QL STRIP.AUTO: NEGATIVE
CHOLEST SERPL-MCNC: 202 MG/DL (ref 100–199)
COLOR UR: YELLOW
EOSINOPHIL # BLD AUTO: 0.13 K/UL (ref 0–0.51)
EOSINOPHIL NFR BLD: 3.3 % (ref 0–6.9)
EPI CELLS #/AREA URNS HPF: ABNORMAL /HPF
ERYTHROCYTE [DISTWIDTH] IN BLOOD BY AUTOMATED COUNT: 45.6 FL (ref 35.9–50)
FASTING STATUS PATIENT QL REPORTED: NORMAL
GLUCOSE UR STRIP.AUTO-MCNC: NEGATIVE MG/DL
HCT VFR BLD AUTO: 45.1 % (ref 37–47)
HCV AB SER QL: NEGATIVE
HDLC SERPL-MCNC: 55 MG/DL
HGB BLD-MCNC: 14.4 G/DL (ref 12–16)
IMM GRANULOCYTES # BLD AUTO: 0.01 K/UL (ref 0–0.11)
IMM GRANULOCYTES NFR BLD AUTO: 0.3 % (ref 0–0.9)
KETONES UR STRIP.AUTO-MCNC: NEGATIVE MG/DL
LDLC SERPL CALC-MCNC: 118 MG/DL
LEUKOCYTE ESTERASE UR QL STRIP.AUTO: ABNORMAL
LYMPHOCYTES # BLD AUTO: 1.31 K/UL (ref 1–4.8)
LYMPHOCYTES NFR BLD: 33.2 % (ref 22–41)
MCH RBC QN AUTO: 30.5 PG (ref 27–33)
MCHC RBC AUTO-ENTMCNC: 31.9 G/DL (ref 33.6–35)
MCV RBC AUTO: 95.6 FL (ref 81.4–97.8)
MICRO URNS: ABNORMAL
MONOCYTES # BLD AUTO: 0.46 K/UL (ref 0–0.85)
MONOCYTES NFR BLD AUTO: 11.6 % (ref 0–13.4)
MUCOUS THREADS #/AREA URNS HPF: ABNORMAL /HPF
NEUTROPHILS # BLD AUTO: 2.03 K/UL (ref 2–7.15)
NEUTROPHILS NFR BLD: 51.3 % (ref 44–72)
NITRITE UR QL STRIP.AUTO: NEGATIVE
NRBC # BLD AUTO: 0 K/UL
NRBC BLD-RTO: 0 /100 WBC
PH UR STRIP.AUTO: 5.5 [PH]
PLATELET # BLD AUTO: 192 K/UL (ref 164–446)
PMV BLD AUTO: 11.4 FL (ref 9–12.9)
PROT UR QL STRIP: NEGATIVE MG/DL
RBC # BLD AUTO: 4.72 M/UL (ref 4.2–5.4)
RBC # URNS HPF: ABNORMAL /HPF
RBC UR QL AUTO: NEGATIVE
SP GR UR STRIP.AUTO: 1.02
TRIGL SERPL-MCNC: 143 MG/DL (ref 0–149)
UROBILINOGEN UR STRIP.AUTO-MCNC: 0.2 MG/DL
WBC # BLD AUTO: 4 K/UL (ref 4.8–10.8)
WBC #/AREA URNS HPF: ABNORMAL /HPF

## 2019-05-10 PROCEDURE — 81001 URINALYSIS AUTO W/SCOPE: CPT

## 2019-05-10 PROCEDURE — 36415 COLL VENOUS BLD VENIPUNCTURE: CPT

## 2019-05-10 PROCEDURE — 85025 COMPLETE CBC W/AUTO DIFF WBC: CPT

## 2019-05-10 PROCEDURE — 80061 LIPID PANEL: CPT

## 2019-05-10 PROCEDURE — 86803 HEPATITIS C AB TEST: CPT

## 2019-06-03 ENCOUNTER — OFFICE VISIT (OUTPATIENT)
Dept: CARDIOLOGY | Facility: MEDICAL CENTER | Age: 64
End: 2019-06-03
Payer: COMMERCIAL

## 2019-06-03 VITALS
WEIGHT: 144 LBS | HEIGHT: 62 IN | HEART RATE: 76 BPM | SYSTOLIC BLOOD PRESSURE: 120 MMHG | DIASTOLIC BLOOD PRESSURE: 82 MMHG | OXYGEN SATURATION: 94 % | BODY MASS INDEX: 26.5 KG/M2

## 2019-06-03 DIAGNOSIS — I10 ESSENTIAL HYPERTENSION: ICD-10-CM

## 2019-06-03 DIAGNOSIS — R07.89 CHEST DISCOMFORT: ICD-10-CM

## 2019-06-03 DIAGNOSIS — E78.5 DYSLIPIDEMIA: ICD-10-CM

## 2019-06-03 DIAGNOSIS — R06.02 SHORTNESS OF BREATH: ICD-10-CM

## 2019-06-03 PROBLEM — R00.2 PALPITATIONS: Status: RESOLVED | Noted: 2019-04-11 | Resolved: 2019-06-03

## 2019-06-03 PROCEDURE — 99214 OFFICE O/P EST MOD 30 MIN: CPT | Performed by: NURSE PRACTITIONER

## 2019-06-03 ASSESSMENT — ENCOUNTER SYMPTOMS
MYALGIAS: 0
PALPITATIONS: 0
LOSS OF CONSCIOUSNESS: 0
PND: 0
BRUISES/BLEEDS EASILY: 0
NAUSEA: 0
DIZZINESS: 0
HEADACHES: 0
INSOMNIA: 0
SHORTNESS OF BREATH: 0
ABDOMINAL PAIN: 0
FEVER: 0
ORTHOPNEA: 0
CHILLS: 0
COUGH: 0

## 2019-06-03 NOTE — PROGRESS NOTES
Chief Complaint   Patient presents with   • Follow-Up   • Shortness of Breath   • Chest Pain   • HTN (Controlled)   • Hyperlipidemia       Subjective:   Funmilayo Egan is a 63 y.o. female who presents today for six week follow-up of chest discomfort and shortness of breath.    Funmilayo is a 63 year old female with history of hypertension and hyperlipidemia, who first saw Dr. Sarmiento in mid April 2019 for evaluation of shortness of breath and chest discomfort. Echocardiogram and coronary calcium CT were ordered.     She is here today for follow-up. Generally, she is feeling better. She is trying to breathe more deeply, and this seems to help her symptoms. No further chest pain, pressure or discomfort; no palpitations; no orthopnea or PND; no dizziness or syncope; no LE edema. BP is well controlled.    Past Medical History:   Diagnosis Date   • ASTHMA     remote hx   • Chest discomfort 04/2019    Coronary CT scan with LAD 11.4.    • COPD     mild, not on meds for it   • Hyperlipidemia    • Hypertension    • OSTEOPOROSIS    • Shortness of breath 05/2019    Echocardiogram with normal LV size, LVEF 65%.Trace MR. Mild TR. RVSP 30mmHg.     Past Surgical History:   Procedure Laterality Date   • ABDOMINAL HYSTERECTOMY TOTAL     • APPENDECTOMY       Family History   Problem Relation Age of Onset   • Heart Disease Mother 65   • Cancer Father    • Heart Disease Brother      Social History     Social History   • Marital status:      Spouse name: N/A   • Number of children: N/A   • Years of education: N/A     Occupational History   • Not on file.     Social History Main Topics   • Smoking status: Former Smoker   • Smokeless tobacco: Never Used      Comment: 20 y.a   • Alcohol use 2.4 oz/week     4 Glasses of wine per week   • Drug use: No   • Sexual activity: No     Other Topics Concern   • Not on file     Social History Narrative   • No narrative on file     Allergies   Allergen Reactions   • Pcn [Penicillins]      Outpatient  "Encounter Prescriptions as of 6/3/2019   Medication Sig Dispense Refill   • metoprolol SR (TOPROL XL) 100 MG TABLET SR 24 HR Take 1 Tab by mouth every day. 30 Tab 11   • Turmeric Powder by Does not apply route.     • vitamin D (CHOLECALCIFEROL) 1000 UNIT Tab Take 1,000 Units by mouth every day.     • sertraline (ZOLOFT) 50 MG Tab Take 1 Tab by mouth every day. 90 Tab 3   • raloxifene (EVISTA) 60 MG Tab Take 1 Tab by mouth every day. 90 Tab 3   • SHINGRIX 50 MCG/0.5ML Recon Susp        No facility-administered encounter medications on file as of 6/3/2019.      Review of Systems   Constitutional: Negative for chills and fever.   HENT: Negative for congestion.    Respiratory: Negative for cough and shortness of breath.    Cardiovascular: Negative for chest pain, palpitations, orthopnea, leg swelling and PND.   Gastrointestinal: Negative for abdominal pain and nausea.   Musculoskeletal: Negative for myalgias.   Skin: Negative for rash.   Neurological: Negative for dizziness, loss of consciousness and headaches.   Endo/Heme/Allergies: Does not bruise/bleed easily.   Psychiatric/Behavioral: The patient does not have insomnia.         Objective:   /82 (BP Location: Right arm, Patient Position: Sitting)   Pulse 76   Ht 1.575 m (5' 2\")   Wt 65.3 kg (144 lb)   SpO2 94%   BMI 26.34 kg/m²     Physical Exam   Constitutional: She is oriented to person, place, and time. She appears well-developed and well-nourished.   HENT:   Head: Normocephalic.   Eyes: EOM are normal.   Neck: Normal range of motion. Neck supple. No JVD present.   Cardiovascular: Normal rate, regular rhythm and normal heart sounds.    Pulmonary/Chest: Effort normal and breath sounds normal. No respiratory distress. She has no wheezes. She has no rales.   Abdominal: Soft. Bowel sounds are normal. She exhibits no distension. There is no tenderness.   Musculoskeletal: Normal range of motion. She exhibits no edema.   Neurological: She is alert and oriented " to person, place, and time.   Skin: Skin is warm and dry. No rash noted.   Psychiatric: She has a normal mood and affect.     CONCLUSIONS OF ECHOCARDIOGRAM OF 5/9/2019:  No prior study is available for comparison.   Normal left ventricular chamber size. Normal left ventricular wall   thickness. Normal left ventricular systolic function. Left ventricular   ejection fraction is visually estimated to be 65%. Normal regional wall   motion. Normal diastolic function.    FINDINGS OF CORONARY CALCIUM CT OF 4/28/2019:  Coronary calcification:  LMA - 0.0  LCX - 0.0  LAD - 11.4  RCA - 0.0  PDA - 0.0  Total Calcium Score: 11.4    Lab Results   Component Value Date/Time    CHOLSTRLTOT 202 (H) 05/10/2019 11:17 AM     (H) 05/10/2019 11:17 AM    HDL 55 05/10/2019 11:17 AM    TRIGLYCERIDE 143 05/10/2019 11:17 AM       Lab Results   Component Value Date/Time    SODIUM 144 04/15/2019 08:37 AM    POTASSIUM 4.4 04/15/2019 08:37 AM    CHLORIDE 106 04/15/2019 08:37 AM    CO2 26 04/15/2019 08:37 AM    GLUCOSE 101 (H) 04/15/2019 08:37 AM    BUN 17 04/15/2019 08:37 AM    CREATININE 0.70 04/15/2019 08:37 AM    BUNCREATRAT 24 04/15/2019 08:37 AM     Lab Results   Component Value Date/Time    ALKPHOSPHAT 35 (L) 04/15/2019 08:37 AM    ASTSGOT 17 04/15/2019 08:37 AM    ALTSGPT 21 04/15/2019 08:37 AM    TBILIRUBIN 0.3 04/15/2019 08:37 AM        Assessment:     1. Shortness of breath     2. Chest discomfort     3. Essential hypertension     4. Dyslipidemia         Medical Decision Making:  Today's Assessment / Status / Plan:     1. Shortness of breath, improved, with normal echocardiogram. Reviewed results with patient, and she is reassured.    2. Chest discomfort, now resolved. Reviewed coronary calcium CT score with patient; to work on diet and lifestyle modifications.    3. Hypertension, treated and stable. BP is good today.    4. Hyperlipidemia, treated with diet alone.    Reviewed above tests with patient, and she is reassured.  Continue same medications, and work on lifestyle modifications. Follow-up only with us on an as-needed basis.    Thank you for allowing us to participate in the care of this patient.    Collaborating MD: Denys

## 2019-06-03 NOTE — LETTER
[facility]   [address]   [address]  [place] [address]  Phone: [phone]  Fax: [phone]              Funmilayo Egan  1955    Encounter Date: 6/3/2019    SLOANE Rojas          PROGRESS NOTE:  Chief Complaint   Patient presents with   • Follow-Up   • Shortness of Breath   • Chest Pain   • HTN (Controlled)   • Hyperlipidemia       Subjective:   Funmilayo Egan is a 63 y.o. female who presents today for six week follow-up of chest discomfort and shortness of breath.    Funmilayo is a 63 year old female with history of hypertension and hyperlipidemia, who first saw Dr. Sarmiento in mid April 2019 for evaluation of shortness of breath and chest discomfort. Echocardiogram and coronary calcium CT were ordered.     She is here today for follow-up. Generally, she is feeling better. She is trying to breathe more deeply, and this seems to help her symptoms. No further chest pain, pressure or discomfort; no palpitations; no orthopnea or PND; no dizziness or syncope; no LE edema. BP is well controlled.    Past Medical History:   Diagnosis Date   • ASTHMA     remote hx   • Chest discomfort 04/2019    Coronary CT scan with LAD 11.4.    • COPD     mild, not on meds for it   • Hyperlipidemia    • Hypertension    • OSTEOPOROSIS    • Shortness of breath 05/2019    Echocardiogram with normal LV size, LVEF 65%.Trace MR. Mild TR. RVSP 30mmHg.     Past Surgical History:   Procedure Laterality Date   • ABDOMINAL HYSTERECTOMY TOTAL     • APPENDECTOMY       Family History   Problem Relation Age of Onset   • Heart Disease Mother 65   • Cancer Father    • Heart Disease Brother      Social History     Social History   • Marital status:      Spouse name: N/A   • Number of children: N/A   • Years of education: N/A     Occupational History   • Not on file.     Social History Main Topics   • Smoking status: Former Smoker   • Smokeless tobacco: Never Used      Comment: 20  "y.a   • Alcohol use 2.4 oz/week     4 Glasses of wine per week   • Drug use: No   • Sexual activity: No     Other Topics Concern   • Not on file     Social History Narrative   • No narrative on file     Allergies   Allergen Reactions   • Pcn [Penicillins]      Outpatient Encounter Prescriptions as of 6/3/2019   Medication Sig Dispense Refill   • metoprolol SR (TOPROL XL) 100 MG TABLET SR 24 HR Take 1 Tab by mouth every day. 30 Tab 11   • Turmeric Powder by Does not apply route.     • vitamin D (CHOLECALCIFEROL) 1000 UNIT Tab Take 1,000 Units by mouth every day.     • sertraline (ZOLOFT) 50 MG Tab Take 1 Tab by mouth every day. 90 Tab 3   • raloxifene (EVISTA) 60 MG Tab Take 1 Tab by mouth every day. 90 Tab 3   • SHINGRIX 50 MCG/0.5ML Recon Susp        No facility-administered encounter medications on file as of 6/3/2019.      Review of Systems   Constitutional: Negative for chills and fever.   HENT: Negative for congestion.    Respiratory: Negative for cough and shortness of breath.    Cardiovascular: Negative for chest pain, palpitations, orthopnea, leg swelling and PND.   Gastrointestinal: Negative for abdominal pain and nausea.   Musculoskeletal: Negative for myalgias.   Skin: Negative for rash.   Neurological: Negative for dizziness, loss of consciousness and headaches.   Endo/Heme/Allergies: Does not bruise/bleed easily.   Psychiatric/Behavioral: The patient does not have insomnia.         Objective:   /82 (BP Location: Right arm, Patient Position: Sitting)   Pulse 76   Ht 1.575 m (5' 2\")   Wt 65.3 kg (144 lb)   SpO2 94%   BMI 26.34 kg/m²      Physical Exam   Constitutional: She is oriented to person, place, and time. She appears well-developed and well-nourished.   HENT:   Head: Normocephalic.   Eyes: EOM are normal.   Neck: Normal range of motion. Neck supple. No JVD present.   Cardiovascular: Normal rate, regular rhythm and normal heart sounds.    Pulmonary/Chest: Effort normal and breath sounds " normal. No respiratory distress. She has no wheezes. She has no rales.   Abdominal: Soft. Bowel sounds are normal. She exhibits no distension. There is no tenderness.   Musculoskeletal: Normal range of motion. She exhibits no edema.   Neurological: She is alert and oriented to person, place, and time.   Skin: Skin is warm and dry. No rash noted.   Psychiatric: She has a normal mood and affect.     CONCLUSIONS OF ECHOCARDIOGRAM OF 5/9/2019:  No prior study is available for comparison.   Normal left ventricular chamber size. Normal left ventricular wall   thickness. Normal left ventricular systolic function. Left ventricular   ejection fraction is visually estimated to be 65%. Normal regional wall   motion. Normal diastolic function.    FINDINGS OF CORONARY CALCIUM CT OF 4/28/2019:  Coronary calcification:  LMA - 0.0  LCX - 0.0  LAD - 11.4  RCA - 0.0  PDA - 0.0  Total Calcium Score: 11.4    Lab Results   Component Value Date/Time    CHOLSTRLTOT 202 (H) 05/10/2019 11:17 AM     (H) 05/10/2019 11:17 AM    HDL 55 05/10/2019 11:17 AM    TRIGLYCERIDE 143 05/10/2019 11:17 AM       Lab Results   Component Value Date/Time    SODIUM 144 04/15/2019 08:37 AM    POTASSIUM 4.4 04/15/2019 08:37 AM    CHLORIDE 106 04/15/2019 08:37 AM    CO2 26 04/15/2019 08:37 AM    GLUCOSE 101 (H) 04/15/2019 08:37 AM    BUN 17 04/15/2019 08:37 AM    CREATININE 0.70 04/15/2019 08:37 AM    BUNCREATRAT 24 04/15/2019 08:37 AM     Lab Results   Component Value Date/Time    ALKPHOSPHAT 35 (L) 04/15/2019 08:37 AM    ASTSGOT 17 04/15/2019 08:37 AM    ALTSGPT 21 04/15/2019 08:37 AM    TBILIRUBIN 0.3 04/15/2019 08:37 AM        Assessment:     1. Shortness of breath     2. Chest discomfort     3. Essential hypertension     4. Dyslipidemia         Medical Decision Making:  Today's Assessment / Status / Plan:     1. Shortness of breath, improved, with normal echocardiogram. Reviewed results with patient, and she is reassured.    2. Chest discomfort, now  resolved. Reviewed coronary calcium CT score with patient; to work on diet and lifestyle modifications.    3. Hypertension, treated and stable. BP is good today.    4. Hyperlipidemia, treated with diet alone.    Reviewed above tests with patient, and she is reassured. Continue same medications, and work on lifestyle modifications. Follow-up only with us on an as-needed basis.    Thank you for allowing us to participate in the care of this patient.    Collaborating MD: Denys Pritchard Recipients

## 2019-07-09 ENCOUNTER — HOSPITAL ENCOUNTER (OUTPATIENT)
Facility: MEDICAL CENTER | Age: 64
End: 2019-07-09
Attending: PHYSICIAN ASSISTANT
Payer: COMMERCIAL

## 2019-07-09 ENCOUNTER — OFFICE VISIT (OUTPATIENT)
Dept: URGENT CARE | Facility: MEDICAL CENTER | Age: 64
End: 2019-07-09
Payer: COMMERCIAL

## 2019-07-09 VITALS
DIASTOLIC BLOOD PRESSURE: 76 MMHG | OXYGEN SATURATION: 94 % | SYSTOLIC BLOOD PRESSURE: 118 MMHG | TEMPERATURE: 97.4 F | HEART RATE: 66 BPM | HEIGHT: 62 IN | BODY MASS INDEX: 26.5 KG/M2 | WEIGHT: 144 LBS | RESPIRATION RATE: 16 BRPM

## 2019-07-09 DIAGNOSIS — L02.212 ABSCESS OF BACK: ICD-10-CM

## 2019-07-09 DIAGNOSIS — L02.212 ABSCESS OF BACK: Primary | ICD-10-CM

## 2019-07-09 LAB
GRAM STN SPEC: NORMAL
SIGNIFICANT IND 70042: NORMAL
SITE SITE: NORMAL
SOURCE SOURCE: NORMAL

## 2019-07-09 PROCEDURE — 87070 CULTURE OTHR SPECIMN AEROBIC: CPT

## 2019-07-09 PROCEDURE — 10060 I&D ABSCESS SIMPLE/SINGLE: CPT | Performed by: PHYSICIAN ASSISTANT

## 2019-07-09 PROCEDURE — 87205 SMEAR GRAM STAIN: CPT

## 2019-07-09 RX ORDER — SULFAMETHOXAZOLE AND TRIMETHOPRIM 800; 160 MG/1; MG/1
1 TABLET ORAL 2 TIMES DAILY
Qty: 14 TAB | Refills: 0 | Status: SHIPPED | OUTPATIENT
Start: 2019-07-09 | End: 2019-07-16

## 2019-07-09 RX ORDER — TRAMADOL HYDROCHLORIDE 50 MG/1
50-100 TABLET ORAL EVERY 6 HOURS PRN
Qty: 20 TAB | Refills: 0 | Status: SHIPPED | OUTPATIENT
Start: 2019-07-09 | End: 2019-07-16

## 2019-07-09 NOTE — PATIENT INSTRUCTIONS
Abscess  Care After  An abscess (also called a boil or furuncle) is an infected area that contains a collection of pus. Signs and symptoms of an abscess include pain, tenderness, redness, or hardness, or you may feel a moveable soft area under your skin. An abscess can occur anywhere in the body. The infection may spread to surrounding tissues causing cellulitis. A cut (incision) by the surgeon was made over your abscess and the pus was drained out. Gauze may have been packed into the space to provide a drain that will allow the cavity to heal from the inside outwards. The boil may be painful for 5 to 7 days. Most people with a boil do not have high fevers. Your abscess, if seen early, may not have localized, and may not have been lanced. If not, another appointment may be required for this if it does not get better on its own or with medications.  HOME CARE INSTRUCTIONS   · Only take over-the-counter or prescription medicines for pain, discomfort, or fever as directed by your caregiver.  · When you bathe, soak and then remove gauze or iodoform packs at least daily or as directed by your caregiver. You may then wash the wound gently with mild soapy water. Repack with gauze or do as your caregiver directs.  SEEK IMMEDIATE MEDICAL CARE IF:   · You develop increased pain, swelling, redness, drainage, or bleeding in the wound site.  · You develop signs of generalized infection including muscle aches, chills, fever, or a general ill feeling.  · An oral temperature above 102° F (38.9° C) develops, not controlled by medication.  See your caregiver for a recheck if you develop any of the symptoms described above. If medications (antibiotics) were prescribed, take them as directed.  Document Released: 07/06/2006 Document Revised: 03/11/2013 Document Reviewed: 03/02/2009  [employer]® Patient Information ©2014 [employer].

## 2019-07-09 NOTE — PROCEDURES
"Procedure: Incision and Drainage  -Risks, benefits, and alternatives discussed. Risks including infection, bleeding, nerve damage, and poor cosmetic outcome  -Sterile technique throughout  -Local anesthesia with 2% lidocaine with epinephrine  -Incision with #11 blade into fluctuant area with purulent material expressed  -Culture obtained and packaged for lab  -Cavity probed and MULTIPLE loculations bluntly taken down with hemostat  -Irrigated copiously with NS  -Packed with 1/2\" gauze  -Minimal bleeding with good hemostasis achieved  -The patient tolerated the procedure well  "

## 2019-07-09 NOTE — PROGRESS NOTES
Subjective:      Pt is a 63 y.o. female who presents with Wound Infection (infection on back, x 1 week )            HPI  This is a new problem. Pt notes abscess on back x 2-3 months worsening in the last week with redness and swelling along bra-line. Pt has not taken any Rx medications for this condition. Pt states the pain is a 5/10, aching in nature and worse during the day with friction from bra. Pt denies CP, SOB, NVD, paresthesias, headaches, dizziness, change in vision, hives, or other joint pain. The pt's medication list, problem list, and allergies have been evaluated and reviewed during today's visit.    PMH:  Past Medical History:   Diagnosis Date   • ASTHMA     remote hx   • Chest discomfort 2019    Coronary CT scan with LAD 11.4.    • COPD     mild, not on meds for it   • Hyperlipidemia    • Hypertension    • OSTEOPOROSIS    • Shortness of breath 2019    Echocardiogram with normal LV size, LVEF 65%.Trace MR. Mild TR. RVSP 30mmHg.       PSH:  Past Surgical History:   Procedure Laterality Date   • ABDOMINAL HYSTERECTOMY TOTAL     • APPENDECTOMY         Fam Hx:    family history includes Cancer in her father; Heart Disease in her brother; Heart Disease (age of onset: 65) in her mother.  Family Status   Relation Status   • Mo    • Fa    • Bro Alive       Soc HX:  Social History     Social History   • Marital status:      Spouse name: N/A   • Number of children: N/A   • Years of education: N/A     Occupational History   • Not on file.     Social History Main Topics   • Smoking status: Former Smoker   • Smokeless tobacco: Never Used      Comment: 20 y.a   • Alcohol use 2.4 oz/week     4 Glasses of wine per week   • Drug use: No   • Sexual activity: No     Other Topics Concern   • Not on file     Social History Narrative   • No narrative on file         Medications:    Current Outpatient Prescriptions:   •  sulfamethoxazole-trimethoprim (BACTRIM DS) 800-160 MG tablet, Take 1 Tab by  "mouth 2 times a day for 7 days., Disp: 14 Tab, Rfl: 0  •  tramadol (ULTRAM) 50 MG Tab, Take 1-2 Tabs by mouth every 6 hours as needed for up to 7 days., Disp: 20 Tab, Rfl: 0  •  SHINGRIX 50 MCG/0.5ML Recon Susp, , Disp: , Rfl:   •  metoprolol SR (TOPROL XL) 100 MG TABLET SR 24 HR, Take 1 Tab by mouth every day., Disp: 30 Tab, Rfl: 11  •  Turmeric Powder, by Does not apply route., Disp: , Rfl:   •  vitamin D (CHOLECALCIFEROL) 1000 UNIT Tab, Take 1,000 Units by mouth every day., Disp: , Rfl:   •  sertraline (ZOLOFT) 50 MG Tab, Take 1 Tab by mouth every day., Disp: 90 Tab, Rfl: 3  •  raloxifene (EVISTA) 60 MG Tab, Take 1 Tab by mouth every day., Disp: 90 Tab, Rfl: 3      Allergies:  Pcn [penicillins]    ROS  Constitutional: Negative for fever, chills and malaise/fatigue.   HENT: Negative for congestion and sore throat.    Eyes: Negative for blurred vision, double vision and photophobia.   Respiratory: Negative for cough and shortness of breath.  Cardiovascular: Negative for chest pain and palpitations.   Gastrointestinal: Negative for heartburn, nausea, vomiting, abdominal pain, diarrhea and constipation.   Genitourinary: Negative for dysuria and flank pain.   Musculoskeletal: Negative for joint pain and myalgias.   Skin: +abscess of back  Neurological: Negative for dizziness, tingling and headaches.   Endo/Heme/Allergies: Does not bruise/bleed easily.   Psychiatric/Behavioral: Negative for depression. The patient is not nervous/anxious.           Objective:     /76   Pulse 66   Temp 36.3 °C (97.4 °F)   Resp 16   Ht 1.575 m (5' 2\")   Wt 65.3 kg (144 lb)   SpO2 94%   BMI 26.34 kg/m²      Physical Exam   Skin: Skin is warm. Capillary refill takes less than 2 seconds. Lesion noted. No rash noted. There is erythema. No cyanosis. Nails show no clubbing.          Constitutional: PT is oriented to person, place, and time. PT appears well-developed and well-nourished. No distress.   HENT:   Head: Normocephalic and " "atraumatic.   Mouth/Throat: Oropharynx is clear and moist. No oropharyngeal exudate.   Eyes: Conjunctivae normal and EOM are normal. Pupils are equal, round, and reactive to light.   Neck: Normal range of motion. Neck supple. No thyromegaly present.   Cardiovascular: Normal rate, regular rhythm, normal heart sounds and intact distal pulses.  Exam reveals no gallop and no friction rub.    No murmur heard.  Pulmonary/Chest: Effort normal and breath sounds normal. No respiratory distress. PT has no wheezes. PT has no rales. Pt exhibits no tenderness.   Abdominal: Soft. Bowel sounds are normal. PT exhibits no distension and no mass. There is no tenderness. There is no rebound and no guarding.   Musculoskeletal: Normal range of motion. PT exhibits no edema and no tenderness.   Neurological: PT is alert and oriented to person, place, and time. PT has normal reflexes. No cranial nerve deficit.       Psychiatric: PT has a normal mood and affect. PT behavior is normal. Judgment and thought content normal.             Assessment/Plan:     1. Abscess of back    - sulfamethoxazole-trimethoprim (BACTRIM DS) 800-160 MG tablet; Take 1 Tab by mouth 2 times a day for 7 days.  Dispense: 14 Tab; Refill: 0  - tramadol (ULTRAM) 50 MG Tab; Take 1-2 Tabs by mouth every 6 hours as needed for up to 7 days.  Dispense: 20 Tab; Refill: 0  - Consent for Opiate Prescription  - CULTURE WOUND W/ GRAM STAIN; Future    Procedure: Incision and Drainage  -Risks, benefits, and alternatives discussed. Risks including infection, bleeding, nerve damage, and poor cosmetic outcome  -Sterile technique throughout  -Local anesthesia with 2% lidocaine with epinephrine  -Incision with #11 blade into fluctuant area with purulent material expressed  -Culture obtained and packaged for lab  -Cavity probed and MULTIPLE loculations bluntly taken down with hemostat  -Irrigated copiously with NS  -Packed with 1/2\" gauze  -Minimal bleeding with good hemostasis " achieved  -The patient tolerated the procedure well      [place]  Aware web site evaluation: I have obtained and reviewed patient utilization report from [facility] pharmacy database prior to writing prescription for controlled substance II, III or IV per [place] bill . Based on the report and my clinical assessment the prescription is medically necessary.   NSAIDs for pain 1-5, Ultram for pain 6-10 or to help get to sleep.  Wound cx pending  Ice therapy discussed  OTC ibuprofen for pain control as needed as well  Rest, fluids encouraged.  AVS with medical info given.  Pt was in full understanding and agreement with the plan.  Follow-up 3 days for wound check and packing removal

## 2019-07-11 ENCOUNTER — APPOINTMENT (OUTPATIENT)
Dept: URGENT CARE | Facility: MEDICAL CENTER | Age: 64
End: 2019-07-11
Payer: COMMERCIAL

## 2019-07-11 ENCOUNTER — OFFICE VISIT (OUTPATIENT)
Dept: URGENT CARE | Facility: MEDICAL CENTER | Age: 64
End: 2019-07-11
Payer: COMMERCIAL

## 2019-07-11 VITALS
DIASTOLIC BLOOD PRESSURE: 70 MMHG | HEIGHT: 62 IN | BODY MASS INDEX: 26.5 KG/M2 | SYSTOLIC BLOOD PRESSURE: 116 MMHG | WEIGHT: 144 LBS | OXYGEN SATURATION: 97 % | RESPIRATION RATE: 12 BRPM | HEART RATE: 79 BPM | TEMPERATURE: 97.9 F

## 2019-07-11 DIAGNOSIS — L02.222 BOIL, BACK: ICD-10-CM

## 2019-07-11 LAB
BACTERIA WND AEROBE CULT: NORMAL
GRAM STN SPEC: NORMAL
SIGNIFICANT IND 70042: NORMAL
SITE SITE: NORMAL
SOURCE SOURCE: NORMAL

## 2019-07-11 NOTE — PROGRESS NOTES
"Subjective:      Funmilayo Egan is a 63 y.o. female who presents with Wound Check    - I&D recheck. No issues/complaints  - well healing I&D on back. No packing, no DC  - f/u prn, wound care discussed         HPI    Review of Systems   All other systems reviewed and are negative.         Objective:     /70   Pulse 79   Temp 36.6 °C (97.9 °F) (Temporal)   Resp 12   Ht 1.575 m (5' 2\")   Wt 65.3 kg (144 lb)   SpO2 97%   BMI 26.34 kg/m²      Physical Exam   Constitutional: She appears well-developed and well-nourished.               Assessment/Plan:       "

## 2019-07-15 ENCOUNTER — HOSPITAL ENCOUNTER (EMERGENCY)
Facility: MEDICAL CENTER | Age: 64
End: 2019-07-15
Attending: EMERGENCY MEDICINE
Payer: COMMERCIAL

## 2019-07-15 VITALS
HEART RATE: 64 BPM | RESPIRATION RATE: 16 BRPM | HEIGHT: 62 IN | DIASTOLIC BLOOD PRESSURE: 81 MMHG | WEIGHT: 144.84 LBS | TEMPERATURE: 96.7 F | SYSTOLIC BLOOD PRESSURE: 144 MMHG | BODY MASS INDEX: 26.65 KG/M2 | OXYGEN SATURATION: 92 %

## 2019-07-15 DIAGNOSIS — L50.9 URTICARIA: ICD-10-CM

## 2019-07-15 DIAGNOSIS — T78.40XA ALLERGIC REACTION, INITIAL ENCOUNTER: ICD-10-CM

## 2019-07-15 PROCEDURE — 700111 HCHG RX REV CODE 636 W/ 250 OVERRIDE (IP): Performed by: EMERGENCY MEDICINE

## 2019-07-15 PROCEDURE — 700111 HCHG RX REV CODE 636 W/ 250 OVERRIDE (IP)

## 2019-07-15 PROCEDURE — 96372 THER/PROPH/DIAG INJ SC/IM: CPT

## 2019-07-15 PROCEDURE — 99283 EMERGENCY DEPT VISIT LOW MDM: CPT

## 2019-07-15 RX ORDER — DEXAMETHASONE SODIUM PHOSPHATE 4 MG/ML
6 INJECTION, SOLUTION INTRA-ARTICULAR; INTRALESIONAL; INTRAMUSCULAR; INTRAVENOUS; SOFT TISSUE ONCE
Status: COMPLETED | OUTPATIENT
Start: 2019-07-15 | End: 2019-07-15

## 2019-07-15 RX ORDER — HYDROXYZINE HYDROCHLORIDE 50 MG/ML
50 INJECTION, SOLUTION INTRAMUSCULAR ONCE
Status: DISCONTINUED | OUTPATIENT
Start: 2019-07-15 | End: 2019-07-15 | Stop reason: HOSPADM

## 2019-07-15 RX ORDER — HYDROXYZINE HYDROCHLORIDE 25 MG/ML
INJECTION, SOLUTION INTRAMUSCULAR
Status: COMPLETED
Start: 2019-07-15 | End: 2019-07-15

## 2019-07-15 RX ADMIN — HYDROXYZINE HYDROCHLORIDE 50 MG: 25 INJECTION, SOLUTION INTRAMUSCULAR at 02:46

## 2019-07-15 RX ADMIN — DEXAMETHASONE SODIUM PHOSPHATE 6 MG: 4 INJECTION, SOLUTION INTRA-ARTICULAR; INTRALESIONAL; INTRAMUSCULAR; INTRAVENOUS; SOFT TISSUE at 02:37

## 2019-07-15 NOTE — ED TRIAGE NOTES
Patient states rash noted at ~2300. Red welting rash noted to chest, bilateral breast, and back; Itching with no rash reported to bilateral hands/wrist; Denies SOB; ABC's intact; No known new lotions, soaps, or detergents; Patient was started on antibiotic 6 days ago after having a cyst removed on back.

## 2019-07-15 NOTE — ED PROVIDER NOTES
"ED Provider Note    CHIEF COMPLAINT  Chief Complaint   Patient presents with   • Rash       HPI  Funmilayo Egan is a 63 y.o. female who presents with a report that she recently had a incision and drainage of abscess on her back 6 days ago at the urgent care and was given Bactrim.  Late yesterday she developed severe itching and at about 11:00 at night broke out in a rash on her back, flank, under her breasts, bilateral upper extremities.  She says the itching is quite intense.  Denies any fever, chills, sweats.  Denies any other new foods or medications and does have a penicillin allergy    ROS  Pertinent negative for fever.    PAST MEDICAL HISTORY  Past Medical History:   Diagnosis Date   • ASTHMA     remote hx   • Chest discomfort 04/2019    Coronary CT scan with LAD 11.4.    • COPD     mild, not on meds for it   • Hyperlipidemia    • Hypertension    • OSTEOPOROSIS    • Shortness of breath 05/2019    Echocardiogram with normal LV size, LVEF 65%.Trace MR. Mild TR. RVSP 30mmHg.       FAMILY HISTORY  Family History   Problem Relation Age of Onset   • Heart Disease Mother 65   • Cancer Father    • Heart Disease Brother        SOCIAL HISTORY   reports that she has quit smoking. She has never used smokeless tobacco. She reports that she drinks about 2.4 oz of alcohol per week . She reports that she does not use drugs.    SURGICAL HISTORY  Past Surgical History:   Procedure Laterality Date   • ABDOMINAL HYSTERECTOMY TOTAL     • APPENDECTOMY         CURRENT MEDICATIONS  Home Medications    **Home medications have not yet been reviewed for this encounter**         ALLERGIES  Allergies   Allergen Reactions   • Pcn [Penicillins]        PHYSICAL EXAM  VITAL SIGNS: /81   Pulse 70   Temp 35.9 °C (96.7 °F) (Temporal)   Resp 18   Ht 1.575 m (5' 2\")   Wt 65.7 kg (144 lb 13.5 oz)   SpO2 94%   BMI 26.49 kg/m²    Constitutional: Well developed, Well nourished, No acute distress, Non-toxic appearance.   Neck: No " stridor  Lymphatic: No lymphadenopathy  Cardiovascular: Regular rate and rhythm without murmur  Thorax & Lungs: No wheezes, normal respiratory rate  Abdomen:   Skin: Multiple areas of urticaria identified of the back, bilateral flanks, trunk, bilateral upper extremities.  The lower extremities, face, scalp are spared  Back: Bandage of her recently incision and drainage area  Neurologic: Neurologically intact  Psychiatric:       COURSE & MEDICAL DECISION MAKING  Pertinent Labs & Imaging studies reviewed. (See chart for details)  No evidence of Owens-August's or anaphylaxis on exam  I administered Vistaril 50 mg intramuscular injection along with 6 mg of intramuscular Decadron.  She understands that this is very likely to be a Bactrim drug allergy which has developed and she is instructed to immediately stop taking antibiotics and take Benadryl as needed at home for continued urticaria.  She should feel a lot better by tomorrow is discharged in stable condition with instructions to return if any significant change in symptoms    FINAL IMPRESSION  1. Allergic reaction, initial encounter    2. Urticaria            Electronically signed by: Martin Lorenzo, 7/15/2019 2:24 AM

## 2019-07-15 NOTE — DISCHARGE INSTRUCTIONS
Please stop taking Bactrim.  We have listed sulfa as a drug allergy for you.  Please use Benadryl at home as needed

## 2019-07-16 ENCOUNTER — OFFICE VISIT (OUTPATIENT)
Dept: URGENT CARE | Facility: MEDICAL CENTER | Age: 64
End: 2019-07-16
Payer: COMMERCIAL

## 2019-07-16 VITALS
TEMPERATURE: 97.5 F | WEIGHT: 139 LBS | HEIGHT: 62 IN | HEART RATE: 80 BPM | OXYGEN SATURATION: 97 % | BODY MASS INDEX: 25.58 KG/M2 | SYSTOLIC BLOOD PRESSURE: 134 MMHG | DIASTOLIC BLOOD PRESSURE: 76 MMHG

## 2019-07-16 DIAGNOSIS — L50.9 HIVES: ICD-10-CM

## 2019-07-16 PROCEDURE — 99214 OFFICE O/P EST MOD 30 MIN: CPT | Mod: 25 | Performed by: FAMILY MEDICINE

## 2019-07-16 RX ORDER — PREDNISONE 10 MG/1
TABLET ORAL
Qty: 21 TAB | Refills: 0 | Status: SHIPPED | OUTPATIENT
Start: 2019-07-16 | End: 2020-01-06

## 2019-07-16 RX ORDER — DIPHENHYDRAMINE HYDROCHLORIDE 50 MG/ML
50 INJECTION INTRAMUSCULAR; INTRAVENOUS ONCE
Status: COMPLETED | OUTPATIENT
Start: 2019-07-16 | End: 2019-07-16

## 2019-07-16 RX ADMIN — DIPHENHYDRAMINE HYDROCHLORIDE 50 MG: 50 INJECTION INTRAMUSCULAR; INTRAVENOUS at 10:05

## 2019-07-16 NOTE — PROGRESS NOTES
"Subjective:      Funmilayo Egan is a 63 y.o. female who presents with Rash (x1 day; w/ itching)            This is a new problem.  63-year-old female presenting with her  for evaluation of itchy hives that started this morning.  She was seen for the same reason yesterday early in the morning in the emergency department.  She did not have any other systemic signs.  She received some injection and Decadron yesterday in the emergency department.  She was not given any prescription for oral steroid when she sent home.  She has not been taking anything over-the-counter for itching that started this morning.  She denies any trouble breathing or swallowing.  She denies any fever or chills.        Review of Systems   All other systems reviewed and are negative.         Objective:     /76 (BP Location: Left arm, Patient Position: Sitting)   Pulse 80   Temp 36.4 °C (97.5 °F)   Ht 1.575 m (5' 2\")   Wt 63 kg (139 lb)   SpO2 97%   BMI 25.42 kg/m²      Physical Exam   Constitutional: She is oriented to person, place, and time. She appears well-developed and well-nourished.  Non-toxic appearance. No distress.   HENT:   Head: Normocephalic and atraumatic.   Right Ear: External ear normal.   Left Ear: External ear normal.   Nose: Nose normal.   Mouth/Throat: Uvula is midline and oropharynx is clear and moist. No oral lesions. No trismus in the jaw. No uvula swelling. No oropharyngeal exudate.   No facial swelling or erythema noted.   Eyes: Conjunctivae are normal.   Cardiovascular: Normal rate and regular rhythm.  Exam reveals no gallop and no friction rub.    No murmur heard.  Pulmonary/Chest: Effort normal. No stridor. No respiratory distress. She has no wheezes. She has no rales.   Neurological: She is alert and oriented to person, place, and time.   Skin: Skin is warm. Rash noted. Rash is urticarial (Scattered noted in the torso upper extremity and also lower extremity but not in the face.). She is not " diaphoretic. No pallor.   Psychiatric: She has a normal mood and affect.               Assessment/Plan:     1. Hives  - predniSONE (DELTASONE) 10 MG Tab; Start 60 mg prednisone on day one, 50 mg PO on day two, 40mg PO on day three, 30 mg on day four, 20 mg on day five, 10 mg p.o. on last day  Dispense: 21 Tab; Refill: 0  - diphenhydrAMINE (BENADRYL) injection 50 mg; 1 mL by Intramuscular route Once.      Localized hive without any systemic signs.  She was given diphenhydramine 50 mg IM.  We discussed that she would benefit from oral steroid taper and a prescription for prednisone 60 mg taper given  We discussed over-the-counter Benadryl use, in this case 1 to 2 tablet every 4 hours as needed for itching  Warning signs reviewed  Plan per orders and instructions

## 2019-07-16 NOTE — PATIENT INSTRUCTIONS
Start oral steroid taper as directed right away  Use Benadryl 1 -2 tablet every 4 hours as needed  Follow up if not significantly improved as expected, sooner if any worsening or new symptoms      For more information see the handout below    Hives  Introduction  Hives (urticaria) are itchy, red, swollen areas on your skin. Hives can show up on any part of your body, and they can vary in size. They can be as small as the tip of a pen or much larger. Hives often fade within 24 hours (acute hives). In other cases, new hives show up after old ones fade. This can continue for many days or weeks (chronic hives).  Hives are caused by your body's reaction to an irritant or to something that you are allergic to (trigger). You can get hives right after being around a trigger or hours later. Hives do not spread from person to person (are not contagious). Hives may get worse if you scratch them, if you exercise, or if you have worries (emotional stress).  Follow these instructions at home:  Medicines  · Take or apply over-the-counter and prescription medicines only as told by your doctor.  · If you were prescribed an antibiotic medicine, use it as told by your doctor. Do not stop taking the antibiotic even if you start to feel better.  Skin Care  · Apply cool, wet cloths (cool compresses) to the itchy, red, swollen areas.  · Do not scratch your skin. Do not rub your skin.  General instructions  · Do not take hot showers or baths. This can make itching worse.  · Do not wear tight clothes.  · Use sunscreen and wear clothing that covers your skin when you are outside.  · Avoid any triggers that cause your hives. Keep a journal to help you keep track of what causes your hives. Write down:  ¨ What medicines you take.  ¨ What you eat and drink.  ¨ What products you use on your skin.  · Keep all follow-up visits as told by your doctor. This is important.  Contact a doctor if:  · Your symptoms are not better with medicine.  · Your  joints are painful or swollen.  Get help right away if:  · You have a fever.  · You have belly pain.  · Your tongue or lips are swollen.  · Your eyelids are swollen.  · Your chest or throat feels tight.  · You have trouble breathing or swallowing.  These symptoms may be an emergency. Do not wait to see if the symptoms will go away. Get medical help right away. Call your local emergency services (911 in the [place] Do not drive yourself to the hospital.   This information is not intended to replace advice given to you by your health care provider. Make sure you discuss any questions you have with your health care provider.  Document Released: 09/26/2009 Document Revised: 05/25/2017 Document Reviewed: 10/05/2016  © 2017 [employer]

## 2020-01-06 ENCOUNTER — HOSPITAL ENCOUNTER (OUTPATIENT)
Dept: RADIOLOGY | Facility: MEDICAL CENTER | Age: 65
End: 2020-01-06
Attending: PHYSICIAN ASSISTANT
Payer: COMMERCIAL

## 2020-01-06 ENCOUNTER — OFFICE VISIT (OUTPATIENT)
Dept: URGENT CARE | Facility: MEDICAL CENTER | Age: 65
End: 2020-01-06
Payer: COMMERCIAL

## 2020-01-06 VITALS
HEART RATE: 68 BPM | TEMPERATURE: 97.4 F | DIASTOLIC BLOOD PRESSURE: 84 MMHG | RESPIRATION RATE: 18 BRPM | OXYGEN SATURATION: 98 % | SYSTOLIC BLOOD PRESSURE: 130 MMHG

## 2020-01-06 DIAGNOSIS — R07.89 COSTOCHONDRAL CHEST PAIN: Primary | ICD-10-CM

## 2020-01-06 DIAGNOSIS — R07.81 RIB PAIN ON RIGHT SIDE: ICD-10-CM

## 2020-01-06 PROCEDURE — 71101 X-RAY EXAM UNILAT RIBS/CHEST: CPT | Mod: RT

## 2020-01-06 PROCEDURE — 99214 OFFICE O/P EST MOD 30 MIN: CPT | Performed by: PHYSICIAN ASSISTANT

## 2020-01-06 RX ORDER — METHYLPREDNISOLONE 4 MG/1
TABLET ORAL
Qty: 21 TAB | Refills: 0 | Status: SHIPPED | OUTPATIENT
Start: 2020-01-06 | End: 2020-01-14

## 2020-01-07 NOTE — PATIENT INSTRUCTIONS
Costochondritis  Your exam shows you have symptoms and findings of costochondritis. This condition causes pain and tenderness over the rib cartilages. It is a common cause of chest pain in both children and adults. It affects women more frequently than men. The symptoms are from irritation (inflammation) of the rib cartilages. The cause is unknown. Chest pain from costochondritis is often made worse by deep breathing, coughing, and movement. Usually there is a tender area over the front of the chest.  Chest pain may also be from more serious conditions such as heart disease, infections, blood clots, pleurisy, and minor injuries. Depending on the nature of your pain, your age, and other risk factors, additional medical evaluation may be needed to find the cause of your pain. Costochondritis is a self-limited condition. This means it will improve on its own without specific treatment. You should rest for the next 2 to 3 days. You can then resume full activity as the pain improves.   Anti-inflammatory drugs or pain medicine may be needed to provide relief. See your caregiver if your pain lasts longer than 1 to 2 weeks.   SEEK IMMEDIATE MEDICAL CARE IF:  · You develop increased pain or pain that radiates into the back, arms, neck, or jaw.   · You develop shortness of breath, productive cough, or you are coughing up blood.   · You develop a fever, chills, general weakness, vomiting, or fainting.   Document Released: 12/18/2006 Document Revised: 03/11/2013 Document Reviewed: 06/11/2008  [employer]® Patient Information ©2013 [employer].

## 2020-01-08 NOTE — PROGRESS NOTES
Subjective:      Pt is a 64 y.o. female who presents with Rib Pain (x3days, pain in right of ribs, hurts to breathe and touch it,was sneezing a lot, pain in right arm)            HPI  This is a new problem. PT notes 3 days of right rib pain after fits of sneezing and concern for pulled muscle of rib fxs. Pt has not taken any Rx medications for this condition. Pt states the pain is a 3-4/10, aching in nature and worse at night. Pt denies CP, SOB, NVD, paresthesias, headaches, dizziness, change in vision, hives, or other joint pain. The pt's medication list, problem list, and allergies have been evaluated and reviewed during today's visit.    PMH:  Past Medical History:   Diagnosis Date   • ASTHMA     remote hx   • Chest discomfort 2019    Coronary CT scan with LAD 11.4.    • COPD     mild, not on meds for it   • Hyperlipidemia    • Hypertension    • OSTEOPOROSIS    • Shortness of breath 2019    Echocardiogram with normal LV size, LVEF 65%.Trace MR. Mild TR. RVSP 30mmHg.       PSH:  Past Surgical History:   Procedure Laterality Date   • ABDOMINAL HYSTERECTOMY TOTAL     • APPENDECTOMY         Fam Hx:    family history includes Cancer in her father; Heart Disease in her brother; Heart Disease (age of onset: 65) in her mother.  Family Status   Relation Name Status   • Mo     • Fa     • Bro  Alive       Soc HX:  Social History     Socioeconomic History   • Marital status:      Spouse name: Not on file   • Number of children: Not on file   • Years of education: Not on file   • Highest education level: Not on file   Occupational History   • Not on file   Social Needs   • Financial resource strain: Not on file   • Food insecurity:     Worry: Not on file     Inability: Not on file   • Transportation needs:     Medical: Not on file     Non-medical: Not on file   Tobacco Use   • Smoking status: Former Smoker     Packs/day: 0.00   • Smokeless tobacco: Never Used   • Tobacco comment: 20 y.a      Substance and Sexual Activity   • Alcohol use: Yes     Alcohol/week: 2.4 oz     Types: 4 Glasses of wine per week   • Drug use: No   • Sexual activity: Never   Lifestyle   • Physical activity:     Days per week: Not on file     Minutes per session: Not on file   • Stress: Not on file   Relationships   • Social connections:     Talks on phone: Not on file     Gets together: Not on file     Attends Sabianism service: Not on file     Active member of club or organization: Not on file     Attends meetings of clubs or organizations: Not on file     Relationship status: Not on file   • Intimate partner violence:     Fear of current or ex partner: Not on file     Emotionally abused: Not on file     Physically abused: Not on file     Forced sexual activity: Not on file   Other Topics Concern   • Not on file   Social History Narrative   • Not on file         Medications:    Current Outpatient Medications:   •  methylPREDNISolone (MEDROL DOSEPAK) 4 MG Tablet Therapy Pack, Follow schedule on package instructions., Disp: 21 Tab, Rfl: 0  •  SHINGRIX 50 MCG/0.5ML Recon Susp, , Disp: , Rfl:   •  metoprolol SR (TOPROL XL) 100 MG TABLET SR 24 HR, Take 1 Tab by mouth every day., Disp: 30 Tab, Rfl: 11  •  Turmeric Powder, by Does not apply route., Disp: , Rfl:   •  vitamin D (CHOLECALCIFEROL) 1000 UNIT Tab, Take 1,000 Units by mouth every day., Disp: , Rfl:   •  sertraline (ZOLOFT) 50 MG Tab, Take 1 Tab by mouth every day., Disp: 90 Tab, Rfl: 3  •  raloxifene (EVISTA) 60 MG Tab, Take 1 Tab by mouth every day., Disp: 90 Tab, Rfl: 3      Allergies:  Pcn [penicillins] and Sulfa drugs    ROS  Constitutional: Negative for fever, chills and malaise/fatigue.   HENT: Negative for congestion and sore throat.    Eyes: Negative for blurred vision, double vision and photophobia.   Respiratory: +right rib pain  Cardiovascular: Negative for chest pain and palpitations.   Gastrointestinal: Negative for heartburn, nausea, vomiting, abdominal pain,  diarrhea and constipation.   Genitourinary: Negative for dysuria and flank pain.   Musculoskeletal: Negative for joint pain and myalgias.   Skin: Negative for itching and rash.   Neurological: Negative for dizziness, tingling and headaches.   Endo/Heme/Allergies: Does not bruise/bleed easily.   Psychiatric/Behavioral: Negative for depression. The patient is not nervous/anxious.           Objective:     /84   Pulse 68   Temp 36.3 °C (97.4 °F) (Temporal)   Resp 18   SpO2 98%      Physical Exam  Chest:      Chest wall: Tenderness present. No mass, lacerations, deformity, swelling, crepitus or edema. There is no dullness to percussion.      Breasts:         Right: Normal.         Left: Normal.       Lymphadenopathy:      Upper Body:      Right upper body: No supraclavicular, axillary or pectoral adenopathy.      Left upper body: No supraclavicular, axillary or pectoral adenopathy.            Constitutional: PT is oriented to person, place, and time. PT appears well-developed and well-nourished. No distress.   HENT:   Head: Normocephalic and atraumatic.   Mouth/Throat: Oropharynx is clear and moist. No oropharyngeal exudate.   Eyes: Conjunctivae normal and EOM are normal. Pupils are equal, round, and reactive to light.   Neck: Normal range of motion. Neck supple. No thyromegaly present.   Cardiovascular: Normal rate, regular rhythm, normal heart sounds and intact distal pulses.  Exam reveals no gallop and no friction rub.    No murmur heard.  Pulmonary/Chest: Effort normal and breath sounds normal. No respiratory distress. PT has no wheezes. PT has no rales. Abdominal: Soft. Bowel sounds are normal. PT exhibits no distension and no mass. There is no tenderness. There is no rebound and no guarding.   Musculoskeletal: Normal range of motion. PT exhibits no edema and no tenderness.   Neurological: PT is alert and oriented to person, place, and time. PT has normal reflexes. No cranial nerve deficit.   Skin: Skin is  warm and dry. No rash noted. PT is not diaphoretic. No erythema.       Psychiatric: PT has a normal mood and affect. PT behavior is normal. Judgment and thought content normal.      RADS:  [id number] (WITH 1-VIEW CXR) RIGHT   Order: [id number]   Status:  Final result   Visible to patient:  No (Not Released)   Next appt:  None   Dx:  Rib pain on right side   Details     Reading Physician Reading Date Result Priority   Saad Chang M.D. [facility]      Narrative & Impression        1/6/2020 6:09 PM     HISTORY/REASON FOR EXAM:  Right right rib pain for 3 days.        TECHNIQUE/EXAM DESCRIPTION AND NUMBER OF VIEWS:  5 images of the right ribs and chest.     COMPARISON: NONE     FINDINGS:  A BB marker was placed bilaterally at sites of pain in the inferior lateral aspect of each hemithorax.  No fractures or acute bony changes are noted.  There is no evidence of a hemo or pneumothorax.     IMPRESSION:     Negative right rib series.            Last Resulted: 01/06/20  6:46 PM             Assessment/Plan:       1. Costochondral chest pain    - methylPREDNISolone (MEDROL DOSEPAK) 4 MG Tablet Therapy Pack; Follow schedule on package instructions.  Dispense: 21 Tab; Refill: 0    2. Rib pain on right side    - [id number] (WITH 1-VIEW CXR) RIGHT; Future  - methylPREDNISolone (MEDROL DOSEPAK) 4 MG Tablet Therapy Pack; Follow schedule on package instructions.  Dispense: 21 Tab; Refill: 0    RICE therapy discussed  Gentle ROM exercises discussed  WBAT BUE  Ice/heat therapy discussed  OTC ibuprofen for pain control  Rest, fluids encouraged.  AVS with medical info given.  Pt was in full understanding and agreement with the plan.  Follow-up as needed if symptoms worsen or fail to improve.

## 2020-01-14 ENCOUNTER — OFFICE VISIT (OUTPATIENT)
Dept: URGENT CARE | Facility: MEDICAL CENTER | Age: 65
End: 2020-01-14
Payer: COMMERCIAL

## 2020-01-14 VITALS
BODY MASS INDEX: 26.13 KG/M2 | DIASTOLIC BLOOD PRESSURE: 98 MMHG | WEIGHT: 142 LBS | SYSTOLIC BLOOD PRESSURE: 132 MMHG | OXYGEN SATURATION: 95 % | HEIGHT: 62 IN | TEMPERATURE: 97.5 F | HEART RATE: 66 BPM

## 2020-01-14 DIAGNOSIS — M94.0 COSTOCHONDRITIS, ACUTE: Primary | ICD-10-CM

## 2020-01-14 LAB
APPEARANCE UR: CLEAR
BILIRUB UR STRIP-MCNC: NEGATIVE MG/DL
COLOR UR AUTO: YELLOW
GLUCOSE UR STRIP.AUTO-MCNC: NEGATIVE MG/DL
KETONES UR STRIP.AUTO-MCNC: NEGATIVE MG/DL
LEUKOCYTE ESTERASE UR QL STRIP.AUTO: NEGATIVE
NITRITE UR QL STRIP.AUTO: NEGATIVE
PH UR STRIP.AUTO: 6.5 [PH] (ref 5–8)
PROT UR QL STRIP: NEGATIVE MG/DL
RBC UR QL AUTO: NEGATIVE
SP GR UR STRIP.AUTO: 1.02
UROBILINOGEN UR STRIP-MCNC: 0.2 MG/DL

## 2020-01-14 PROCEDURE — 99214 OFFICE O/P EST MOD 30 MIN: CPT | Performed by: PHYSICIAN ASSISTANT

## 2020-01-14 PROCEDURE — 81002 URINALYSIS NONAUTO W/O SCOPE: CPT | Performed by: PHYSICIAN ASSISTANT

## 2020-01-14 RX ORDER — CYCLOBENZAPRINE HCL 5 MG
5-10 TABLET ORAL 3 TIMES DAILY PRN
Qty: 30 TAB | Refills: 0 | Status: SHIPPED | OUTPATIENT
Start: 2020-01-14 | End: 2020-04-02

## 2020-01-15 NOTE — PROGRESS NOTES
Subjective:      Pt is a 64 y.o. female who presents with Rib Injury (pain, just finished steroid but pain is still there, not as bad, right back side )            HPI  This is a new problem. PT notes 11 days of right rib pain after fits of sneezing and concern for pulled muscle of rib fxs. Pt was seen in  on 20 and given medrol pack which she notes worked for awhile and CXR revealed no fxs or lung issues.   Pt states the pain is a 3-4/10, aching in nature and worse at night. Pt denies CP, SOB, NVD, paresthesias, headaches, dizziness, change in vision, hives, or other joint pain. The pt's medication list, problem list, and allergies have been evaluated and reviewed during today's visit.    PMH:  Past Medical History:   Diagnosis Date   • ASTHMA     remote hx   • Chest discomfort 2019    Coronary CT scan with LAD 11.4.    • COPD     mild, not on meds for it   • Hyperlipidemia    • Hypertension    • OSTEOPOROSIS    • Shortness of breath 2019    Echocardiogram with normal LV size, LVEF 65%.Trace MR. Mild TR. RVSP 30mmHg.       PSH:  Past Surgical History:   Procedure Laterality Date   • ABDOMINAL HYSTERECTOMY TOTAL     • APPENDECTOMY         Fam Hx:    family history includes Cancer in her father; Heart Disease in her brother; Heart Disease (age of onset: 65) in her mother.  Family Status   Relation Name Status   • Mo     • Fa     • Bro  Alive       Soc HX:  Social History     Socioeconomic History   • Marital status:      Spouse name: Not on file   • Number of children: Not on file   • Years of education: Not on file   • Highest education level: Not on file   Occupational History   • Not on file   Social Needs   • Financial resource strain: Not on file   • Food insecurity:     Worry: Not on file     Inability: Not on file   • Transportation needs:     Medical: Not on file     Non-medical: Not on file   Tobacco Use   • Smoking status: Former Smoker     Packs/day: 0.00   • Smokeless  tobacco: Never Used   • Tobacco comment: 20 y.a   Substance and Sexual Activity   • Alcohol use: Yes     Alcohol/week: 2.4 oz     Types: 4 Glasses of wine per week   • Drug use: No   • Sexual activity: Never   Lifestyle   • Physical activity:     Days per week: Not on file     Minutes per session: Not on file   • Stress: Not on file   Relationships   • Social connections:     Talks on phone: Not on file     Gets together: Not on file     Attends Cheondoism service: Not on file     Active member of club or organization: Not on file     Attends meetings of clubs or organizations: Not on file     Relationship status: Not on file   • Intimate partner violence:     Fear of current or ex partner: Not on file     Emotionally abused: Not on file     Physically abused: Not on file     Forced sexual activity: Not on file   Other Topics Concern   • Not on file   Social History Narrative   • Not on file         Medications:    Current Outpatient Medications:   •  cyclobenzaprine (FLEXERIL) 5 MG tablet, Take 1-2 Tabs by mouth 3 times a day as needed., Disp: 30 Tab, Rfl: 0  •  SHINGRIX 50 MCG/0.5ML Recon Susp, , Disp: , Rfl:   •  metoprolol SR (TOPROL XL) 100 MG TABLET SR 24 HR, Take 1 Tab by mouth every day., Disp: 30 Tab, Rfl: 11  •  Turmeric Powder, by Does not apply route., Disp: , Rfl:   •  vitamin D (CHOLECALCIFEROL) 1000 UNIT Tab, Take 1,000 Units by mouth every day., Disp: , Rfl:   •  sertraline (ZOLOFT) 50 MG Tab, Take 1 Tab by mouth every day., Disp: 90 Tab, Rfl: 3  •  raloxifene (EVISTA) 60 MG Tab, Take 1 Tab by mouth every day., Disp: 90 Tab, Rfl: 3      Allergies:  Pcn [penicillins] and Sulfa drugs    ROS  Constitutional: Negative for fever, chills and malaise/fatigue.   HENT: Negative for congestion and sore throat.    Eyes: Negative for blurred vision, double vision and photophobia.   Respiratory: Negative for cough and shortness of breath. +right sided rib pain  Cardiovascular: Negative for chest pain and  "palpitations.   Gastrointestinal: Negative for heartburn, nausea, vomiting, abdominal pain, diarrhea and constipation.   Genitourinary: Negative for dysuria and flank pain.   Musculoskeletal: Negative for joint pain and myalgias.   Skin: Negative for itching and rash.   Neurological: Negative for dizziness, tingling and headaches.   Endo/Heme/Allergies: Does not bruise/bleed easily.   Psychiatric/Behavioral: Negative for depression. The patient is not nervous/anxious.           Objective:     /98   Pulse 66   Temp 36.4 °C (97.5 °F) (Temporal)   Ht 1.575 m (5' 2\")   Wt 64.4 kg (142 lb)   SpO2 95%   BMI 25.97 kg/m²      Physical Exam  Exam conducted with a chaperone present.   Chest:      Chest wall: Tenderness present.               Constitutional: PT is oriented to person, place, and time. PT appears well-developed and well-nourished. No distress.   HENT:   Head: Normocephalic and atraumatic.   Mouth/Throat: Oropharynx is clear and moist. No oropharyngeal exudate.   Eyes: Conjunctivae normal and EOM are normal. Pupils are equal, round, and reactive to light.   Neck: Normal range of motion. Neck supple. No thyromegaly present.   Cardiovascular: Normal rate, regular rhythm, normal heart sounds and intact distal pulses.  Exam reveals no gallop and no friction rub.    No murmur heard.  Pulmonary/Chest: Effort normal and breath sounds normal. No respiratory distress. PT has no wheezes. PT has no rales.  Abdominal: Soft. Bowel sounds are normal. PT exhibits no distension and no mass. There is no tenderness. There is no rebound and no guarding.   Musculoskeletal: Normal range of motion. PT exhibits no edema and no tenderness.   Neurological: PT is alert and oriented to person, place, and time. PT has normal reflexes. No cranial nerve deficit.   Skin: Skin is warm and dry. No rash noted. PT is not diaphoretic. No erythema.       Psychiatric: PT has a normal mood and affect. PT behavior is normal. Judgment and " thought content normal.          Assessment/Plan:       1. Costochondritis, acute    - POCT Urinalysis-->WNL  - cyclobenzaprine (FLEXERIL) 5 MG tablet; Take 1-2 Tabs by mouth 3 times a day as needed.  Dispense: 30 Tab; Refill: 0  - REFERRAL TO SPORTS MEDICINE    Pt counseled on length of recovery time for costochondritis but is feeling impatient and wishes to feel no pain  Rest, fluids encouraged.  AVS with medical info given.  Pt was in full understanding and agreement with the plan.  Differential diagnosis, natural history, supportive care, and indications for immediate follow-up discussed. All questions answered. Patient agrees with the plan of care.  Follow-up as needed if symptoms worsen or fail to improve to PCP, Urgent care or Emergency Room.

## 2020-01-15 NOTE — PATIENT INSTRUCTIONS
Costochondritis  Your exam shows you have symptoms and findings of costochondritis. This condition causes pain and tenderness over the rib cartilages. It is a common cause of chest pain in both children and adults. It affects women more frequently than men. The symptoms are from irritation (inflammation) of the rib cartilages. The cause is unknown. Chest pain from costochondritis is often made worse by deep breathing, coughing, and movement. Usually there is a tender area over the front of the chest.  Chest pain may also be from more serious conditions such as heart disease, infections, blood clots, pleurisy, and minor injuries. Depending on the nature of your pain, your age, and other risk factors, additional medical evaluation may be needed to find the cause of your pain. Costochondritis is a self-limited condition. This means it will improve on its own without specific treatment. You should rest for the next 2 to 3 days. You can then resume full activity as the pain improves.   Anti-inflammatory drugs or pain medicine may be needed to provide relief. See your caregiver if your pain lasts longer than 1 to 2 weeks.   SEEK IMMEDIATE MEDICAL CARE IF:  · You develop increased pain or pain that radiates into the back, arms, neck, or jaw.   · You develop shortness of breath, productive cough, or you are coughing up blood.   · You develop a fever, chills, general weakness, vomiting, or fainting.

## 2020-01-22 ENCOUNTER — OFFICE VISIT (OUTPATIENT)
Dept: MEDICAL GROUP | Facility: CLINIC | Age: 65
End: 2020-01-22
Payer: COMMERCIAL

## 2020-01-22 VITALS
SYSTOLIC BLOOD PRESSURE: 134 MMHG | DIASTOLIC BLOOD PRESSURE: 88 MMHG | BODY MASS INDEX: 26.13 KG/M2 | HEART RATE: 74 BPM | WEIGHT: 142 LBS | OXYGEN SATURATION: 98 % | RESPIRATION RATE: 12 BRPM | HEIGHT: 62 IN | TEMPERATURE: 98.2 F

## 2020-01-22 DIAGNOSIS — R10.11 RUQ PAIN: ICD-10-CM

## 2020-01-22 DIAGNOSIS — S29.011A INTERCOSTAL MUSCLE STRAIN, INITIAL ENCOUNTER: ICD-10-CM

## 2020-01-22 PROCEDURE — 99214 OFFICE O/P EST MOD 30 MIN: CPT | Performed by: FAMILY MEDICINE

## 2020-01-22 RX ORDER — ATENOLOL AND CHLORTHALIDONE TABLET 100; 25 MG/1; MG/1
1 TABLET ORAL
COMMUNITY
Start: 2019-11-21 | End: 2020-04-02

## 2020-01-22 RX ORDER — LIDOCAINE 50 MG/G
1 PATCH TOPICAL EVERY 24 HOURS
Qty: 10 PATCH | Refills: 0 | Status: SHIPPED | OUTPATIENT
Start: 2020-01-22 | End: 2020-07-16

## 2020-01-22 ASSESSMENT — ENCOUNTER SYMPTOMS
COUGH: 0
VOMITING: 0
HEADACHES: 1
SORE THROAT: 0
NAUSEA: 0
FEVER: 0
DIARRHEA: 0
ABDOMINAL PAIN: 1

## 2020-01-23 NOTE — PROGRESS NOTES
"Subjective:     Funmilayo Egan is a 64 y.o. female who presents for Rib Pain (Onset 1/3/2020, sneezed and experienced rib pain. Pain is on the right side of ribs that radiates to the back. 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   HENT: Negative for congestion and sore throat.    Respiratory: Negative for cough.    Cardiovascular: Negative for chest pain.   Gastrointestinal: Positive for abdominal pain. Negative for diarrhea, nausea and vomiting.   Skin: Negative for rash.   Neurological: Positive for headaches.     PMH:  has a past medical history of ASTHMA, Chest discomfort (04/2019), COPD, Hyperlipidemia, Hypertension, OSTEOPOROSIS, and Shortness of breath (05/2019).  MEDS:   Current Outpatient Medications:   •  atenolol-chlorthalidone (TENORETIC) 100-25 MG per tablet, Take 1 Tab by mouth every day., Disp: , Rfl:   •  cyclobenzaprine (FLEXERIL) 5 MG tablet, Take 1-2 Tabs by mouth 3 times a day as needed., Disp: 30 Tab, Rfl: 0  •  SHINGRIX 50 MCG/0.5ML Recon Susp, , Disp: , Rfl:   •  metoprolol SR (TOPROL XL) 100 MG TABLET SR 24 HR, Take 1 Tab by mouth every day., Disp: 30 Tab, Rfl: 11  •  Turmeric Powder, by Does not apply route., Disp: , Rfl:   •  vitamin D (CHOLECALCIFEROL) 1000 UNIT Tab, Take 1,000 Units by mouth every day., Disp: , Rfl:   •  sertraline (ZOLOFT) 50 MG Tab, Take 1 Tab by mouth every day., Disp: 90 Tab, Rfl: 3  •  raloxifene (EVISTA) 60 MG Tab, Take 1 " "Tab by mouth every day., Disp: 90 Tab, Rfl: 3  ALLERGIES:   Allergies   Allergen Reactions   • Pcn [Penicillins]    • Sulfa Drugs Rash     7/15/19 welting urticaria/rash after taking Bactrim      SURGHX:   Past Surgical History:   Procedure Laterality Date   • ABDOMINAL HYSTERECTOMY TOTAL     • APPENDECTOMY       SOCHX:  reports that she has quit smoking. She smoked 0.00 packs per day. She has never used smokeless tobacco. She reports current alcohol use of about 2.4 oz of alcohol per week. She reports that she does not use drugs.  FH: Family history was reviewed, not contributing to acute complaint      Objective:   /88 (BP Location: Left arm, Patient Position: Sitting, BP Cuff Size: Adult)   Pulse 74   Temp 36.8 °C (98.2 °F) (Temporal)   Resp 12   Ht 1.575 m (5' 2\")   Wt 64.4 kg (142 lb)   SpO2 98%   BMI 25.97 kg/m²     Physical Exam  Constitutional:       General: She is not in acute distress.     Appearance: She is well-developed. She is not diaphoretic.   HENT:      Head: Normocephalic and atraumatic.   Cardiovascular:      Rate and Rhythm: Normal rate and regular rhythm.   Pulmonary:      Effort: Pulmonary effort is normal. No respiratory distress.      Breath sounds: Normal breath sounds. No stridor. No wheezing, rhonchi or rales.   Abdominal:      General: Abdomen is flat. Bowel sounds are normal. There is no distension.      Palpations: Abdomen is soft.      Tenderness: There is no guarding.      Comments: +Right sided CVA tenderness, +TTP in RUQ, epigastric area, and right mid abdomen, no rebound, no guarding    Skin:     General: Skin is warm and dry.      Findings: No erythema.   Neurological:      Mental Status: She is alert and oriented to person, place, and time.      Sensory: No sensory deficit.   Psychiatric:         Mood and Affect: Mood normal.         Behavior: Behavior normal.         Thought Content: Thought content normal.         Judgment: Judgment normal.       Assessment/Plan: "   Assessment    1. Intercostal muscle strain, initial encounter  - lidocaine (LIDODERM) 5 % Patch; Apply 1 Patch to skin as directed every 24 hours.  Dispense: 10 Patch; Refill: 0    2. RUQ pain  - US-RUQ; Future  - LIPASE; Future  - CBC WITH DIFFERENTIAL; Future  - Comp Metabolic Panel; Future    Pt with intercostal muscle strain, and did not respond well to steroids.  Will give lidocaine patches and reviewed other supportive care measures.  Pt also with some abdominal tenderness.  Unclear if this is separate issue or contributing to current rib pain.  Patient with a right upper quadrant pain and positive Castro's sign.  Will obtain labs and right upper quadrant ultrasound in order to further evaluate for gallstones or other pathology which could be contributing to right rib pain.

## 2020-01-25 ENCOUNTER — TELEPHONE (OUTPATIENT)
Dept: URGENT CARE | Facility: CLINIC | Age: 65
End: 2020-01-25

## 2020-01-26 NOTE — TELEPHONE ENCOUNTER
Medication called into [facility] in [facility] due to cost/needed a P/A and the patient is able to use the Good Rx card for medication. Cost at [facility] roughly 45.00 vs 86.00. I consulted with Dr. Vargas via telephone today and he approved this. Medication was cancelled at the [facility] off of [address].       Outpatient Medication Detail      Disp Refills Start End    lidocaine (LIDODERM) 5 % Patch 10 Patch 0/0 1/22/2020     Sig - Route: Apply 1 Patch to skin as directed every 24 hours. - Transdermal    Sent to pharmacy as: Lidocaine 5 % External Patch (LIDODERM)    E-Prescribing Status: Receipt confirmed by pharmacy (1/22/2020  4:42 PM PST)

## 2020-01-28 ENCOUNTER — HOSPITAL ENCOUNTER (OUTPATIENT)
Dept: LAB | Facility: MEDICAL CENTER | Age: 65
End: 2020-01-28
Attending: FAMILY MEDICINE
Payer: COMMERCIAL

## 2020-01-28 DIAGNOSIS — R10.11 RUQ PAIN: ICD-10-CM

## 2020-01-28 LAB
ALBUMIN SERPL BCP-MCNC: 4.6 G/DL (ref 3.2–4.9)
ALBUMIN/GLOB SERPL: 1.8 G/DL
ALP SERPL-CCNC: 45 U/L (ref 30–99)
ALT SERPL-CCNC: 20 U/L (ref 2–50)
ANION GAP SERPL CALC-SCNC: 13 MMOL/L (ref 0–11.9)
AST SERPL-CCNC: 21 U/L (ref 12–45)
BASOPHILS # BLD AUTO: 0.2 % (ref 0–1.8)
BASOPHILS # BLD: 0.01 K/UL (ref 0–0.12)
BILIRUB SERPL-MCNC: 0.4 MG/DL (ref 0.1–1.5)
BUN SERPL-MCNC: 20 MG/DL (ref 8–22)
CALCIUM SERPL-MCNC: 9.5 MG/DL (ref 8.4–10.2)
CHLORIDE SERPL-SCNC: 101 MMOL/L (ref 96–112)
CO2 SERPL-SCNC: 24 MMOL/L (ref 20–33)
CREAT SERPL-MCNC: 0.64 MG/DL (ref 0.5–1.4)
EOSINOPHIL # BLD AUTO: 0.14 K/UL (ref 0–0.51)
EOSINOPHIL NFR BLD: 2.8 % (ref 0–6.9)
ERYTHROCYTE [DISTWIDTH] IN BLOOD BY AUTOMATED COUNT: 43.8 FL (ref 35.9–50)
FASTING STATUS PATIENT QL REPORTED: NORMAL
GLOBULIN SER CALC-MCNC: 2.5 G/DL (ref 1.9–3.5)
GLUCOSE SERPL-MCNC: 98 MG/DL (ref 65–99)
HCT VFR BLD AUTO: 43.7 % (ref 37–47)
HGB BLD-MCNC: 14.8 G/DL (ref 12–16)
IMM GRANULOCYTES # BLD AUTO: 0.01 K/UL (ref 0–0.11)
IMM GRANULOCYTES NFR BLD AUTO: 0.2 % (ref 0–0.9)
LIPASE SERPL-CCNC: 27 U/L (ref 7–58)
LYMPHOCYTES # BLD AUTO: 1.34 K/UL (ref 1–4.8)
LYMPHOCYTES NFR BLD: 26.6 % (ref 22–41)
MCH RBC QN AUTO: 30.8 PG (ref 27–33)
MCHC RBC AUTO-ENTMCNC: 33.9 G/DL (ref 33.6–35)
MCV RBC AUTO: 91 FL (ref 81.4–97.8)
MONOCYTES # BLD AUTO: 0.47 K/UL (ref 0–0.85)
MONOCYTES NFR BLD AUTO: 9.3 % (ref 0–13.4)
NEUTROPHILS # BLD AUTO: 3.07 K/UL (ref 2–7.15)
NEUTROPHILS NFR BLD: 60.9 % (ref 44–72)
NRBC # BLD AUTO: 0 K/UL
NRBC BLD-RTO: 0 /100 WBC
PLATELET # BLD AUTO: 174 K/UL (ref 164–446)
PMV BLD AUTO: 9.5 FL (ref 9–12.9)
POTASSIUM SERPL-SCNC: 4 MMOL/L (ref 3.6–5.5)
PROT SERPL-MCNC: 7.1 G/DL (ref 6–8.2)
RBC # BLD AUTO: 4.8 M/UL (ref 4.2–5.4)
SODIUM SERPL-SCNC: 138 MMOL/L (ref 135–145)
WBC # BLD AUTO: 5 K/UL (ref 4.8–10.8)

## 2020-01-28 PROCEDURE — 83690 ASSAY OF LIPASE: CPT

## 2020-01-28 PROCEDURE — 36415 COLL VENOUS BLD VENIPUNCTURE: CPT

## 2020-01-28 PROCEDURE — 80053 COMPREHEN METABOLIC PANEL: CPT

## 2020-01-28 PROCEDURE — 85025 COMPLETE CBC W/AUTO DIFF WBC: CPT

## 2020-02-13 DIAGNOSIS — I10 ESSENTIAL HYPERTENSION: Primary | ICD-10-CM

## 2020-02-13 RX ORDER — METOPROLOL SUCCINATE 100 MG/1
TABLET, EXTENDED RELEASE ORAL
Qty: 90 TAB | Refills: 2 | Status: SHIPPED | OUTPATIENT
Start: 2020-02-13 | End: 2021-01-25

## 2020-02-17 ENCOUNTER — HOSPITAL ENCOUNTER (OUTPATIENT)
Dept: RADIOLOGY | Facility: MEDICAL CENTER | Age: 65
End: 2020-02-17
Attending: FAMILY MEDICINE
Payer: COMMERCIAL

## 2020-02-17 DIAGNOSIS — R10.11 RUQ PAIN: ICD-10-CM

## 2020-02-17 PROCEDURE — 76705 ECHO EXAM OF ABDOMEN: CPT

## 2020-03-03 ENCOUNTER — OFFICE VISIT (OUTPATIENT)
Dept: MEDICAL GROUP | Facility: MEDICAL CENTER | Age: 65
End: 2020-03-03
Payer: COMMERCIAL

## 2020-03-03 VITALS
TEMPERATURE: 97 F | HEART RATE: 68 BPM | BODY MASS INDEX: 26.06 KG/M2 | WEIGHT: 141.6 LBS | OXYGEN SATURATION: 100 % | SYSTOLIC BLOOD PRESSURE: 126 MMHG | HEIGHT: 62 IN | DIASTOLIC BLOOD PRESSURE: 70 MMHG

## 2020-03-03 DIAGNOSIS — L72.3 SEBACEOUS CYST: ICD-10-CM

## 2020-03-03 DIAGNOSIS — R10.9 RIGHT SIDED ABDOMINAL PAIN: ICD-10-CM

## 2020-03-03 PROCEDURE — 99214 OFFICE O/P EST MOD 30 MIN: CPT | Performed by: FAMILY MEDICINE

## 2020-03-03 ASSESSMENT — FIBROSIS 4 INDEX: FIB4 SCORE: 1.73

## 2020-03-03 ASSESSMENT — PATIENT HEALTH QUESTIONNAIRE - PHQ9: CLINICAL INTERPRETATION OF PHQ2 SCORE: 0

## 2020-03-03 ASSESSMENT — PAIN SCALES - GENERAL: PAINLEVEL: 6=MODERATE PAIN

## 2020-03-03 NOTE — ASSESSMENT & PLAN NOTE
New problem. Patient has sebaceous cyst on back. Became abcess last year. Had to have I&D. She reports it is becoming painful, irritating and itchy again. Would like to have it removed.

## 2020-03-03 NOTE — PROGRESS NOTES
Subjective:     CC: Diagnoses of Right sided abdominal pain and Sebaceous cyst were pertinent to this visit.    HPI: Patient is a 64 y.o. female established patient who presents today with concern regarding right-sided abdominal pain.      Right sided abdominal pain  New problem.  Initially the patient was having right-sided rib pain, thought to have intercostal pain.  Seen by Dr. Vargas. Diagnosed with intercostal pain.  Given steroids.  However, the patient states that she is now more having right upper quadrant pain and her rib pain has resolved.  Had u/s RuQ which was negative aside from fatty liver.   Labs normal including CMP and lipase.   This pain initially started about 1.5 month ago.   The pain the ribs has improved. However, she continues to have RUQ pain. The pain starts when she eats. No pain without eating. Doesn't seem to matter what she eats, she feels nauseous and has pain.   No diarrhea  No blood in the stool or black stools.   No vomiting.   The area is tender.   Radiates around her side and sometimes has epigastric pain.     Denies any weight loss. Denies any constipation.     Sebaceous cyst  New problem. Patient has sebaceous cyst on back. Became abcess last year. Had to have I&D. She reports it is becoming painful, irritating and itchy again. Would like to have it removed.       Past Medical History:   Diagnosis Date   • ASTHMA     remote hx   • Chest discomfort 04/2019    Coronary CT scan with LAD 11.4.    • COPD     mild, not on meds for it   • Hyperlipidemia    • Hypertension    • OSTEOPOROSIS    • Shortness of breath 05/2019    Echocardiogram with normal LV size, LVEF 65%.Trace MR. Mild TR. RVSP 30mmHg.       Social History     Tobacco Use   • Smoking status: Former Smoker     Packs/day: 0.00   • Smokeless tobacco: Never Used   • Tobacco comment: 20 y.a   Substance Use Topics   • Alcohol use: Yes     Alcohol/week: 2.4 oz     Types: 4 Glasses of wine per week   • Drug use: No  "      Current Outpatient Medications Ordered in Epic   Medication Sig Dispense Refill   • metoprolol SR (TOPROL XL) 100 MG TABLET SR 24 HR TAKE 1 TABLET BY MOUTH EVERY DAY 90 Tab 2   • lidocaine (LIDODERM) 5 % Patch Apply 1 Patch to skin as directed every 24 hours. 10 Patch 0   • cyclobenzaprine (FLEXERIL) 5 MG tablet Take 1-2 Tabs by mouth 3 times a day as needed. 30 Tab 0   • SHINGRIX 50 MCG/0.5ML Recon Susp      • Turmeric Powder by Does not apply route.     • sertraline (ZOLOFT) 50 MG Tab Take 1 Tab by mouth every day. 90 Tab 3   • raloxifene (EVISTA) 60 MG Tab Take 1 Tab by mouth every day. 90 Tab 3   • atenolol-chlorthalidone (TENORETIC) 100-25 MG per tablet Take 1 Tab by mouth every day.     • vitamin D (CHOLECALCIFEROL) 1000 UNIT Tab Take 1,000 Units by mouth every day.       No current [facility]-ordered facility-administered medications on file.        Allergies:  Pcn [penicillins] and Sulfa drugs    Health Maintenance: Completed    ROS:  Gen: no fevers/chill, no changes in weight  Eyes: no changes in vision  ENT: no sore throat, no hearing loss, no bloody nose  Pulm: no sob, no cough  CV: no chest pain, no palpitations  GI: no nausea/vomiting, no diarrhea  : no dysuria  MSk: no myalgias  Skin: no rash  Neuro: no headaches, no numbness/tingling  Heme/Lymph: no easy bruising      Objective:       Exam:  /70 (BP Location: Right arm, Patient Position: Sitting, BP Cuff Size: Adult long)   Pulse 68   Temp 36.1 °C (97 °F) (Temporal)   Ht 1.575 m (5' 2\")   Wt 64.2 kg (141 lb 9.6 oz)   SpO2 100%   Breastfeeding No   BMI 25.90 kg/m²  Body mass index is 25.9 kg/m².    General: Normal appearing. No distress.  HEAD: NCAT  EYES: conjunctiva clear, lids without ptosis, pupils equal and reactive to light  EARS: ears normal shape and contour.  MOUTH: normal dentition   Neck:  Normal ROM  Pulmonary: Normal effort. Normal respiratory rate.  Cardiovascular: Well perfused. No LE edema  Neurologic: Grossly normal, no " focal deficits  Abdomen: Soft, tender in the right upper quadrant, right lower quadrant and epigastric.  Skin: Warm and dry.  No obvious lesions.  Musculoskeletal: Normal gait and station.   Psych: Normal mood and affect. Alert and oriented x3. Judgment and insight is normal.    Labs: 1/28/2020 results reviewed and discussed with the patient, questions answered.    Assessment & Plan:     64 y.o. female with the following -     1. Right sided abdominal pain  New problem.  The patient's right-sided rib pain has now resolved but continues to have right upper quadrant pain.  Exacerbated by eating anything.  Ultrasound negative.  CMP and lipase normal.  However, given the pain only occurs with eating ordered HIDA scan.  - NM-BILIARY (HIDA) SCAN WITH CCK; Future    2. Sebaceous cyst  New problem.  Sebaceous cyst noted in the back with slight surrounding erythema.  Given that it is becoming tender and irritated plan to remove.  The visit was scheduled.      No follow-ups on file.    Please note that this dictation was created using voice recognition software. I have made every reasonable attempt to correct obvious errors, but I expect that there are errors of grammar and possibly content that I did not discover before finalizing the note.

## 2020-03-03 NOTE — ASSESSMENT & PLAN NOTE
Seen by Dr. Vargas. Diagnosed with intercostal pain.   Had u/s RuQ which was negative aside from fatty liver.   Labs normal including CMP and lipase.   This pain initially started about 1.5 month ago.   The pain the ribs has improved. However, she continues to have RUQ pain. The pain starts when she eats. No pain without eating. Doesn't seem to matter what she eats, she feels nauseous and has pain.   No diarrhea  No blood in the stool or black stools.   No vomiting.   The area is tender.   Radiates around her side and sometimes has epigastric pain.     Denies any weight loss. Denies any constipation.

## 2020-03-10 ENCOUNTER — OFFICE VISIT (OUTPATIENT)
Dept: MEDICAL GROUP | Facility: MEDICAL CENTER | Age: 65
End: 2020-03-10
Payer: COMMERCIAL

## 2020-03-10 VITALS — HEIGHT: 62 IN | SYSTOLIC BLOOD PRESSURE: 128 MMHG | BODY MASS INDEX: 25.9 KG/M2 | DIASTOLIC BLOOD PRESSURE: 70 MMHG

## 2020-03-10 DIAGNOSIS — L72.3 SEBACEOUS CYST: ICD-10-CM

## 2020-03-10 PROCEDURE — 11401 EXC TR-EXT B9+MARG 0.6-1 CM: CPT | Performed by: FAMILY MEDICINE

## 2020-03-10 NOTE — PROCEDURES
Indications for the procedure: Irritated sebaceous cyst that has become infected previously.    Location/size: 1 cm cyst on the mid, midline back.    Procedure note: After obtaining informed consent the the area was prepped with alcohol and anesthetized with 2cc of 1% lidocaine with epinephrine.  The area was then prepped and draped in the usual sterile fashion.  Using a 11 blade an elliptical incision was made to full-thickness down to  the sebaceous cyst.  The cyst was detached from the surrounding structure with the help of blunt dissection.  Hemostasis was achieved with electrocautery.  The wound was closed with 2 deep sutures (4-0 Vicryl) and 3 epidermal interrupted 4-0 Ethilon sutures. Antibacterial ointment was placed on the wound.  The patient tolerated the procedure well without complication.  After care instructions were given as well as return precautions.

## 2020-03-10 NOTE — PROGRESS NOTES
Patient here today for sebaceous cyst removal on the back.  The cyst is uncomfortable, irritating.  Has had inflammation and abscess formation previously.  Last I&D was in July 2019.

## 2020-03-17 ENCOUNTER — OFFICE VISIT (OUTPATIENT)
Dept: MEDICAL GROUP | Facility: MEDICAL CENTER | Age: 65
End: 2020-03-17
Payer: COMMERCIAL

## 2020-03-17 VITALS
SYSTOLIC BLOOD PRESSURE: 130 MMHG | BODY MASS INDEX: 25.62 KG/M2 | DIASTOLIC BLOOD PRESSURE: 82 MMHG | TEMPERATURE: 97.5 F | OXYGEN SATURATION: 96 % | HEART RATE: 63 BPM | WEIGHT: 139.2 LBS | HEIGHT: 62 IN

## 2020-03-17 DIAGNOSIS — L72.3 SEBACEOUS CYST: ICD-10-CM

## 2020-03-17 PROCEDURE — 99024 POSTOP FOLLOW-UP VISIT: CPT | Performed by: FAMILY MEDICINE

## 2020-03-17 ASSESSMENT — FIBROSIS 4 INDEX: FIB4 SCORE: 1.73

## 2020-03-17 NOTE — PROGRESS NOTES
"Subjective:     CC: There were no encounter diagnoses.    HPI: Patient is a 64 y.o. female established patient who presents today for suture removal. Sebaceous cyst removed. Healing well. Denies any redness, pain, discharge.     Past Medical History:   Diagnosis Date   • ASTHMA     remote hx   • Chest discomfort 04/2019    Coronary CT scan with LAD 11.4.    • COPD     mild, not on meds for it   • Hyperlipidemia    • Hypertension    • OSTEOPOROSIS    • Shortness of breath 05/2019    Echocardiogram with normal LV size, LVEF 65%.Trace MR. Mild TR. RVSP 30mmHg.       Social History     Tobacco Use   • Smoking status: Former Smoker     Packs/day: 0.00   • Smokeless tobacco: Never Used   • Tobacco comment: 20 y.a   Substance Use Topics   • Alcohol use: Yes     Alcohol/week: 2.4 oz     Types: 4 Glasses of wine per week   • Drug use: No       Current Outpatient Medications Ordered in Epic   Medication Sig Dispense Refill   • metoprolol SR (TOPROL XL) 100 MG TABLET SR 24 HR TAKE 1 TABLET BY MOUTH EVERY DAY 90 Tab 2   • atenolol-chlorthalidone (TENORETIC) 100-25 MG per tablet Take 1 Tab by mouth every day.     • lidocaine (LIDODERM) 5 % Patch Apply 1 Patch to skin as directed every 24 hours. 10 Patch 0   • cyclobenzaprine (FLEXERIL) 5 MG tablet Take 1-2 Tabs by mouth 3 times a day as needed. 30 Tab 0   • SHINGRIX 50 MCG/0.5ML Recon Susp      • Turmeric Powder by Does not apply route.     • vitamin D (CHOLECALCIFEROL) 1000 UNIT Tab Take 1,000 Units by mouth every day.     • sertraline (ZOLOFT) 50 MG Tab Take 1 Tab by mouth every day. 90 Tab 3   • raloxifene (EVISTA) 60 MG Tab Take 1 Tab by mouth every day. 90 Tab 3     No current Epic-ordered facility-administered medications on file.        Allergies:  Pcn [penicillins] and Sulfa drugs      Objective:       Exam:  /82 (BP Location: Right arm, Patient Position: Sitting, BP Cuff Size: Adult long)   Pulse 63   Temp 36.4 °C (97.5 °F) (Temporal)   Ht 1.575 m (5' 2\")   Wt " 63.1 kg (139 lb 3.2 oz)   SpO2 96%   Breastfeeding No   BMI 25.46 kg/m²  Body mass index is 25.46 kg/m².    Skin: Warm and dry.  Site of sebaceous cyst removal in the mid back, midline healing well, no signs of infection.        Assessment & Plan:     64 y.o. female with the following -     1. Sebaceous cyst  Here today for suture removal.  3 sutures removed, healing well.  No signs of infection.    No follow-ups on file.    Please note that this dictation was created using voice recognition software. I have made every reasonable attempt to correct obvious errors, but I expect that there are errors of grammar and possibly content that I did not discover before finalizing the note.

## 2020-03-24 ENCOUNTER — HOSPITAL ENCOUNTER (OUTPATIENT)
Dept: RADIOLOGY | Facility: MEDICAL CENTER | Age: 65
End: 2020-03-24
Attending: FAMILY MEDICINE
Payer: COMMERCIAL

## 2020-03-24 DIAGNOSIS — R10.9 RIGHT SIDED ABDOMINAL PAIN: ICD-10-CM

## 2020-03-24 PROCEDURE — A9537 TC99M MEBROFENIN: HCPCS

## 2020-04-02 ENCOUNTER — OFFICE VISIT (OUTPATIENT)
Dept: MEDICAL GROUP | Facility: MEDICAL CENTER | Age: 65
End: 2020-04-02
Payer: COMMERCIAL

## 2020-04-02 VITALS
OXYGEN SATURATION: 96 % | HEART RATE: 74 BPM | WEIGHT: 142 LBS | HEIGHT: 62 IN | SYSTOLIC BLOOD PRESSURE: 132 MMHG | TEMPERATURE: 97.3 F | BODY MASS INDEX: 26.13 KG/M2 | DIASTOLIC BLOOD PRESSURE: 74 MMHG

## 2020-04-02 DIAGNOSIS — R14.0 BLOATING: ICD-10-CM

## 2020-04-02 DIAGNOSIS — R11.0 NAUSEA: ICD-10-CM

## 2020-04-02 DIAGNOSIS — R10.9 RIGHT SIDED ABDOMINAL PAIN: ICD-10-CM

## 2020-04-02 PROCEDURE — 99214 OFFICE O/P EST MOD 30 MIN: CPT | Performed by: FAMILY MEDICINE

## 2020-04-02 ASSESSMENT — FIBROSIS 4 INDEX: FIB4 SCORE: 1.73

## 2020-04-02 NOTE — ASSESSMENT & PLAN NOTE
Chronic problem. Continues to have significant pain in the right side of her abdomen. U/S normal and Hida scan normal. She reports that she feels very bloated. She also has significant nuasea. TH epatient does radiate down to her lower abdomen. Also gets headache after eating. Reports she is having normal BMs and they seem complete as her symptoms resolve after having a BM.   Boating  Heavy full  Painful  She denies any constipation  Pain with eating  No weight loss.

## 2020-04-02 NOTE — PROGRESS NOTES
Subjective:     CC: Diagnoses of Right sided abdominal pain, Nausea, and Bloating were pertinent to this visit.    HPI: Patient is a 64 y.o. female established patient who presents today to f/u on abdominal pain. Now with bloating, more nausea and continued pain with eating.       Right sided abdominal pain  Chronic problem. Continues to have significant pain in the right side of her abdomen, however, now with pain generalized and anteriorly.  Right upper quadrant U/S normal and Hida scan normal. She reports that she feels very bloated. She also has significant nausea. The pain does radiate down to her lower abdomen. Also gets headache after eating. Reports she is having normal BMs and they seem complete as her symptoms resolve after having a BM.   She reports significant boating, upper and lower abdomen feel heavy and full and painful when the bloating occurs.  She denies any constipation  Pain with eating  No weight loss.        Past Medical History:   Diagnosis Date   • ASTHMA     remote hx   • Chest discomfort 04/2019    Coronary CT scan with LAD 11.4.    • COPD     mild, not on meds for it   • Hyperlipidemia    • Hypertension    • OSTEOPOROSIS    • Shortness of breath 05/2019    Echocardiogram with normal LV size, LVEF 65%.Trace MR. Mild TR. RVSP 30mmHg.       Social History     Tobacco Use   • Smoking status: Former Smoker     Packs/day: 0.00   • Smokeless tobacco: Never Used   • Tobacco comment: 20 y.a   Substance Use Topics   • Alcohol use: Yes     Alcohol/week: 2.4 oz     Types: 4 Glasses of wine per week   • Drug use: No       Current Outpatient Medications Ordered in Epic   Medication Sig Dispense Refill   • metoprolol SR (TOPROL XL) 100 MG TABLET SR 24 HR TAKE 1 TABLET BY MOUTH EVERY DAY 90 Tab 2   • lidocaine (LIDODERM) 5 % Patch Apply 1 Patch to skin as directed every 24 hours. 10 Patch 0   • Turmeric Powder by Does not apply route.     • vitamin D (CHOLECALCIFEROL) 1000 UNIT Tab Take 1,000 Units by  "mouth every day.     • sertraline (ZOLOFT) 50 MG Tab Take 1 Tab by mouth every day. 90 Tab 3   • raloxifene (EVISTA) 60 MG Tab Take 1 Tab by mouth every day. 90 Tab 3   • atenolol-chlorthalidone (TENORETIC) 100-25 MG per tablet Take 1 Tab by mouth every day.     • cyclobenzaprine (FLEXERIL) 5 MG tablet Take 1-2 Tabs by mouth 3 times a day as needed. 30 Tab 0   • SHINGRIX 50 MCG/0.5ML Recon Susp        No current Epic-ordered facility-administered medications on file.        Allergies:  Pcn [penicillins] and Sulfa drugs    Health Maintenance: Completed    ROS:  Pulm: no sob, no cough  CV: no chest pain, no palpitations      Objective:       Exam:  /74 (BP Location: Right arm, Patient Position: Sitting, BP Cuff Size: Adult long)   Pulse 74   Temp 36.3 °C (97.3 °F)   Ht 1.575 m (5' 2\")   Wt 64.4 kg (142 lb)   SpO2 96%   Breastfeeding No   BMI 25.97 kg/m²  Body mass index is 25.97 kg/m².    General: Normal appearing. No distress.  HEAD: NCAT  EYES: conjunctiva clear, lids without ptosis, pupils equal and reactive to light  EARS: ears normal shape and contour.  MOUTH: normal dentition   Neck:  Normal ROM  Pulmonary: Normal effort. Normal respiratory rate.  Cardiovascular: Well perfused. No LE edema  Neurologic: Grossly normal, no focal deficits  Abdomen: Soft, tender to palpation throughout in all 4 quadrants, no rebound, no guarding.  No masses noted.  Skin: Warm and dry.  No obvious lesions.  Musculoskeletal: Normal gait and station.   Psych: Normal mood and affect. Alert and oriented x3. Judgment and insight is normal.     Labs: 1/28/2020 results reviewed and discussed with the patient, questions answered.    Assessment & Plan:     64 y.o. female with the following -     1. Right sided abdominal pain  Chronic problem, uncontrolled.  Unfortunately, now the pain is radiating throughout most of the abdomen into the lower abdomen.  Normal right upper quadrant ultrasound, normal HIDA scan, normal lipase and " complete metabolic panel.  Given her significant bloating and nausea after eating plan to start with a low FODMAP diet.  I also advised her to take simethicone over-the-counter.  Printout of the low FODMAP diet given.  Additionally, I have started a referral to gastroenterology.  The patient agrees to message me on my chart to let me know how her symptoms are doing.  - REFERRAL TO GASTROENTEROLOGY    2. Nausea  New problem, see above for plan.  - REFERRAL TO GASTROENTEROLOGY    3. Bloating  New problem, see above for plan.  - REFERRAL TO GASTROENTEROLOGY      Return if symptoms worsen or fail to improve.    Please note that this dictation was created using voice recognition software. I have made every reasonable attempt to correct obvious errors, but I expect that there are errors of grammar and possibly content that I did not discover before finalizing the note.

## 2020-04-27 ENCOUNTER — HOSPITAL ENCOUNTER (OUTPATIENT)
Facility: MEDICAL CENTER | Age: 65
End: 2020-04-27
Attending: INTERNAL MEDICINE
Payer: COMMERCIAL

## 2020-04-27 PROCEDURE — 87338 HPYLORI STOOL AG IA: CPT

## 2020-04-28 LAB — H PYLORI AG STL QL IA: NOT DETECTED

## 2020-05-18 DIAGNOSIS — M81.0 AGE-RELATED OSTEOPOROSIS WITHOUT CURRENT PATHOLOGICAL FRACTURE: ICD-10-CM

## 2020-05-18 RX ORDER — RALOXIFENE HYDROCHLORIDE 60 MG/1
TABLET, FILM COATED ORAL
Qty: 90 TAB | Refills: 3 | Status: SHIPPED | OUTPATIENT
Start: 2020-05-18 | End: 2021-08-02

## 2020-06-04 DIAGNOSIS — F41.9 ANXIETY: ICD-10-CM

## 2020-06-04 DIAGNOSIS — F63.3 HAIR PULLING: ICD-10-CM

## 2020-06-09 ENCOUNTER — HOSPITAL ENCOUNTER (OUTPATIENT)
Dept: LAB | Facility: MEDICAL CENTER | Age: 65
End: 2020-06-09
Attending: NURSE PRACTITIONER
Payer: COMMERCIAL

## 2020-06-09 LAB
ALBUMIN SERPL BCP-MCNC: 4.5 G/DL (ref 3.2–4.9)
ALBUMIN/GLOB SERPL: 2 G/DL
ALP SERPL-CCNC: 44 U/L (ref 30–99)
ALT SERPL-CCNC: 60 U/L (ref 2–50)
AMYLASE SERPL-CCNC: 30 U/L (ref 25–125)
ANION GAP SERPL CALC-SCNC: 12 MMOL/L (ref 7–16)
AST SERPL-CCNC: 54 U/L (ref 12–45)
BILIRUB SERPL-MCNC: 0.4 MG/DL (ref 0.1–1.5)
BUN SERPL-MCNC: 15 MG/DL (ref 8–22)
CALCIUM SERPL-MCNC: 9.4 MG/DL (ref 8.4–10.2)
CHLORIDE SERPL-SCNC: 104 MMOL/L (ref 96–112)
CO2 SERPL-SCNC: 24 MMOL/L (ref 20–33)
CREAT SERPL-MCNC: 0.69 MG/DL (ref 0.5–1.4)
FASTING STATUS PATIENT QL REPORTED: NORMAL
GLOBULIN SER CALC-MCNC: 2.3 G/DL (ref 1.9–3.5)
GLUCOSE SERPL-MCNC: 112 MG/DL (ref 65–99)
LIPASE SERPL-CCNC: 23 U/L (ref 7–58)
POTASSIUM SERPL-SCNC: 4.5 MMOL/L (ref 3.6–5.5)
PROT SERPL-MCNC: 6.8 G/DL (ref 6–8.2)
SODIUM SERPL-SCNC: 140 MMOL/L (ref 135–145)

## 2020-06-09 PROCEDURE — 80053 COMPREHEN METABOLIC PANEL: CPT

## 2020-06-09 PROCEDURE — 83690 ASSAY OF LIPASE: CPT

## 2020-06-09 PROCEDURE — 82150 ASSAY OF AMYLASE: CPT

## 2020-06-09 PROCEDURE — 36415 COLL VENOUS BLD VENIPUNCTURE: CPT

## 2020-07-09 ENCOUNTER — HOSPITAL ENCOUNTER (OUTPATIENT)
Dept: LAB | Facility: MEDICAL CENTER | Age: 65
End: 2020-07-09
Attending: NURSE PRACTITIONER
Payer: COMMERCIAL

## 2020-07-09 LAB
HBV SURFACE AB SERPL IA-ACNC: <3.5 MIU/ML (ref 0–10)
HBV SURFACE AG SER QL: NORMAL
HCV AB SER QL: NORMAL

## 2020-07-09 PROCEDURE — 36415 COLL VENOUS BLD VENIPUNCTURE: CPT

## 2020-07-09 PROCEDURE — 86038 ANTINUCLEAR ANTIBODIES: CPT

## 2020-07-09 PROCEDURE — 86803 HEPATITIS C AB TEST: CPT

## 2020-07-09 PROCEDURE — 86706 HEP B SURFACE ANTIBODY: CPT

## 2020-07-09 PROCEDURE — 86039 ANTINUCLEAR ANTIBODIES (ANA): CPT

## 2020-07-09 PROCEDURE — 87340 HEPATITIS B SURFACE AG IA: CPT

## 2020-07-11 LAB — NUCLEAR IGG SER QL IA: DETECTED

## 2020-07-12 LAB
ANA INTERPRETIVE COMMENT Q5143: NORMAL
ANTINUCLEAR ANTIBODY (ANA), HEP-2, IGG Q5142: NORMAL

## 2020-07-16 ENCOUNTER — TELEMEDICINE (OUTPATIENT)
Dept: MEDICAL GROUP | Facility: MEDICAL CENTER | Age: 65
End: 2020-07-16
Payer: COMMERCIAL

## 2020-07-16 VITALS — HEIGHT: 62 IN | WEIGHT: 133 LBS | BODY MASS INDEX: 24.48 KG/M2 | TEMPERATURE: 98.6 F

## 2020-07-16 DIAGNOSIS — R10.84 GENERALIZED ABDOMINAL PAIN: ICD-10-CM

## 2020-07-16 DIAGNOSIS — R10.9 RIGHT SIDED ABDOMINAL PAIN: ICD-10-CM

## 2020-07-16 PROCEDURE — 99213 OFFICE O/P EST LOW 20 MIN: CPT | Mod: 95,CR | Performed by: FAMILY MEDICINE

## 2020-07-16 RX ORDER — CIPROFLOXACIN 500 MG/1
500 TABLET, FILM COATED ORAL
COMMUNITY
Start: 2020-05-20 | End: 2021-01-25

## 2020-07-16 RX ORDER — PANTOPRAZOLE SODIUM 40 MG/1
TABLET, DELAYED RELEASE ORAL
COMMUNITY
Start: 2020-07-09 | End: 2021-01-25

## 2020-07-16 RX ORDER — METRONIDAZOLE 250 MG/1
TABLET ORAL
COMMUNITY
Start: 2020-05-20 | End: 2021-01-25

## 2020-07-16 ASSESSMENT — FIBROSIS 4 INDEX: FIB4 SCORE: 2.56

## 2020-07-16 NOTE — PROGRESS NOTES
Telemedicine Visit: Established Patient     This encounter was conducted via Zoom .   Verbal consent was obtained. Patient's identity was verified.    Subjective:   CC:   Funmilayo Egan is a 64 y.o. female presenting for evaluation and management of:    Right sided abdominal pain  Chronic problem. Continues to have pain in the R side. Seen by GI (Dr. Hankins) and his PA. Given PPI and an antibiotic, although no improvement. She had to switch care to a different PA at [facility]. She is scheduled for endoscopy for Aug. Cannot see anyone there until after her endoscopy.   Severe pain still on and off. Did improve with probiotics and bland diet for a couple weeks then went back to previous diet and pain returned.   She thinks she has an intestintal blockage  + weight loss  Down about 10lbs  Can only eat oatmeal, plain rice and plain potatoes.       ROS   Denies any recent fevers or chills. No nausea or vomiting. No chest pains or shortness of breath.     Allergies   Allergen Reactions   • Pcn [Penicillins]    • Sulfa Drugs Rash     7/15/19 welting urticaria/rash after taking Bactrim        Current medicines (including changes today)  Current Outpatient Medications   Medication Sig Dispense Refill   • metroNIDAZOLE (FLAGYL) 250 MG Tab TAKE 1 TABLET BY MOUTH 3 TIMES A DAY FOR 10 DAYS     • pantoprazole (PROTONIX) 40 MG Tablet Delayed Response TAKE 1 TABLET BY MOUTH ONCE A DAY 30 MINUTES BEFORE BREAKFAST MEAL     • sertraline (ZOLOFT) 50 MG Tab TAKE 1 TABLET BY MOUTH EVERY DAY 90 Tab 3   • raloxifene (EVISTA) 60 MG Tab TAKE 1 TABLET BY MOUTH EVERY DAY 90 Tab 3   • metoprolol SR (TOPROL XL) 100 MG TABLET SR 24 HR TAKE 1 TABLET BY MOUTH EVERY DAY 90 Tab 2   • Turmeric Powder by Does not apply route.     • vitamin D (CHOLECALCIFEROL) 1000 UNIT Tab Take 1,000 Units by mouth every day.     • ciprofloxacin (CIPRO) 500 MG Tab Take 500 mg by mouth. FOR 10 DAYS     • hyoscyamine (LEVSIN) 0.125 MG SL Tab DISSOLVE 1 2 TABLETS UNDER THE TONGUE  "EVERY 6 HOURS AS NEEDED FOR PAIN (NOT COVERED)     • lidocaine (LIDODERM) 5 % Patch Apply 1 Patch to skin as directed every 24 hours. 10 Patch 0     No current facility-administered medications for this visit.        Patient Active Problem List    Diagnosis Date Noted   • Essential hypertension 03/12/2019     Priority: High   • Dyslipidemia 03/12/2019     Priority: High   • Shortness of breath 04/11/2019     Priority: Medium   • Anxiety 03/12/2019     Priority: Medium   • Chest discomfort 03/12/2019     Priority: Low   • Right sided abdominal pain 03/03/2020   • Sebaceous cyst 03/03/2020   • Urge incontinence of urine 04/12/2019   • Hair pulling 03/12/2019   • Age-related osteoporosis without current pathological fracture 03/12/2019   • Other fatigue 03/12/2019       Family History   Problem Relation Age of Onset   • Heart Disease Mother 65   • Cancer Father    • Heart Disease Brother        She  has a past medical history of ASTHMA, Chest discomfort (04/2019), COPD, Hyperlipidemia, Hypertension, OSTEOPOROSIS, and Shortness of breath (05/2019).  She  has a past surgical history that includes abdominal hysterectomy total and appendectomy.       Objective:   Temp 37 °C (98.6 °F) (Temporal)   Ht 1.575 m (5' 2\")   Wt 60.3 kg (133 lb)   BMI 24.33 kg/m²     Physical Exam:  Constitutional: Alert, no distress, well-groomed.  Skin: No rashes in visible areas.  Eye: Round. Conjunctiva clear, lids normal. No icterus.   ENMT: Lips pink without lesions, good dentition, moist mucous membranes. Phonation normal.  Neck: No masses, no thyromegaly. Moves freely without pain.  CV: Pulse as reported by patient  Respiratory: Unlabored respiratory effort, no cough or audible wheeze  Psych: Alert and oriented x3, normal affect and mood.       Assessment and Plan:   The following treatment plan was discussed:     1. Generalized abdominal pain  Chronic problem. Uncontrolled. No improvement with antibiotic/PPI. Has endoscopy scheduled for " Aug. However, pain has been severe. Having weight loss. Has had US and HIDA, both normal. However, no CT abd done yet. Order placed for CT with. She will continue to follow up with Dr. Kauffman.   - CT-ABDOMEN-PELVIS WITH; Future    2. Right sided abdominal pain        Follow-up: No follow-ups on file.

## 2020-07-16 NOTE — ASSESSMENT & PLAN NOTE
Chronic problem. Continues. Seen by GI (Dr. Hankins) and his PA. Given PPI and an antibiotic. She had to switch care to a different PA at [facility]. She is cheduled for endoscopy for Aug. Cannot see anyone there until after her endoscopy.   Severe pain still on and off. Did improve with probiotics and improved diet for a couple weeks then went back to previous diet and pain returned.   She thinks she has an intestintal blockage  + weight loss  Down about 10lbs  Can only eat oatmeal, plain rice and plain potatoes.

## 2020-08-04 ENCOUNTER — HOSPITAL ENCOUNTER (OUTPATIENT)
Dept: RADIOLOGY | Facility: MEDICAL CENTER | Age: 65
End: 2020-08-04
Attending: FAMILY MEDICINE
Payer: MEDICARE

## 2020-08-04 DIAGNOSIS — R10.84 GENERALIZED ABDOMINAL PAIN: ICD-10-CM

## 2020-08-04 PROCEDURE — 700117 HCHG RX CONTRAST REV CODE 255

## 2020-08-04 PROCEDURE — 74177 CT ABD & PELVIS W/CONTRAST: CPT

## 2020-08-04 RX ADMIN — IOHEXOL 100 ML: 350 INJECTION, SOLUTION INTRAVENOUS at 15:45

## 2020-08-05 ENCOUNTER — TELEPHONE (OUTPATIENT)
Dept: MEDICAL GROUP | Facility: MEDICAL CENTER | Age: 65
End: 2020-08-05

## 2020-08-05 NOTE — TELEPHONE ENCOUNTER
Phone Number called: [phone] (home)   Message: Left pt a mess to call back regarding test results, I also sent pt a mess via my chart.

## 2020-08-05 NOTE — TELEPHONE ENCOUNTER
----- Message from Marija Baker M.D. sent at 8/4/2020  4:07 PM PDT -----  Hi,  Can you please call the patient to let her know that her CT of the abdomen was normal.  No findings that would suggest why she is having her abdominal pain.  Can you schedule her an appointment if she would like to discuss this with me.  Thanks,  Dr. Baker

## 2020-08-12 ENCOUNTER — TELEPHONE (OUTPATIENT)
Dept: MEDICAL GROUP | Facility: MEDICAL CENTER | Age: 65
End: 2020-08-12

## 2020-08-12 NOTE — TELEPHONE ENCOUNTER
VOICEMAIL  1. Caller Name: Funmilayo Egan                      Call Back Number: [phone] (home)     2. Message: Pt called and requested a c/b.  Did not state what the call was about.

## 2020-08-12 NOTE — TELEPHONE ENCOUNTER
Phone Number Called: [phone] (home)     Call outcome: Spoke to patient regarding message below.    Message: spoke with Pt and spouse, call was regarding a billing issue.  CT scan was billed to prior INS and should have gone to Pt new [employer] Medicare which was provided at the time of the visit.  Advised Pt and spouse this would need to handled by billing, and tx call to billing.

## 2020-12-21 ENCOUNTER — TELEMEDICINE (OUTPATIENT)
Dept: MEDICAL GROUP | Facility: MEDICAL CENTER | Age: 65
End: 2020-12-21
Payer: MEDICARE

## 2020-12-21 VITALS
BODY MASS INDEX: 22.82 KG/M2 | TEMPERATURE: 98.6 F | WEIGHT: 124 LBS | HEIGHT: 62 IN | SYSTOLIC BLOOD PRESSURE: 130 MMHG | DIASTOLIC BLOOD PRESSURE: 80 MMHG

## 2020-12-21 DIAGNOSIS — R10.84 GENERALIZED ABDOMINAL PAIN: ICD-10-CM

## 2020-12-21 DIAGNOSIS — R14.0 BLOATING: ICD-10-CM

## 2020-12-21 DIAGNOSIS — K64.9 HEMORRHOIDS, UNSPECIFIED HEMORRHOID TYPE: ICD-10-CM

## 2020-12-21 DIAGNOSIS — R63.4 WEIGHT LOSS: ICD-10-CM

## 2020-12-21 PROCEDURE — 99214 OFFICE O/P EST MOD 30 MIN: CPT | Mod: 95,CR | Performed by: FAMILY MEDICINE

## 2020-12-21 ASSESSMENT — FIBROSIS 4 INDEX: FIB4 SCORE: 2.6

## 2020-12-21 NOTE — ASSESSMENT & PLAN NOTE
Chronic problem.   Had endoscopy in Aug- they dilated her esophagus.   Continues to lose weight  Down 9lbs since July. Down nearly 20lbs since April.   Essentially just eating blended foods/smoothies.   Takes miralax/metamucil BID.- still having    Still on a PPI.   Last seen by GI 2 weeks ago. Breath test was ordered- she plans to do this in Jan.   Up to date on colonoscopy

## 2020-12-21 NOTE — PROGRESS NOTES
Virtual Visit: Established Patient   This visit was conducted via Zoom using secure and encrypted videoconferencing technology. The patient was in a private location in the state of [place].    The patient's identity was confirmed and verbal consent was obtained for this virtual visit.    Subjective:   CC:   Chief Complaint   Patient presents with   • GI Problem     stopped blood pressure medication    • Bloated       Funmilayo Egan is a 65 y.o. female presenting for evaluation and management of:    Bloating  Generalized abdominal pain  Hemorrhoids  Chronic problem.  Patient has been struggling with abdominal pain for months now.  She has been seen by gastroenterology and has had extensive work-up.  She had an ultrasound and HIDA scan which were both normal.  CT abdomen was also normal.  Had endoscopy in Aug- they dilated her esophagus.   Continues to lose weight and have pain.  Down 9lbs since July. Down nearly 20lbs since April.   Essentially just eating blended foods/smoothies.   Takes miralax/metamucil BID.- still having issues with her hemorrhoids.  Still on a PPI.   Last seen by GI 2 weeks ago. Breath test was ordered- she plans to do this in Jan.   Up to date on colonoscopy  She is concerned that she continues to have severe abdominal pain with most solid foods causing bloating weight loss.  Denies any vomiting or diarrhea.        ROS   Denies any recent fevers or chills. No chest pains or shortness of breath.     Allergies   Allergen Reactions   • Pcn [Penicillins]    • Sulfa Drugs Rash     7/15/19 welting urticaria/rash after taking Bactrim        Current medicines (including changes today)  Current Outpatient Medications   Medication Sig Dispense Refill   • hyoscyamine (LEVSIN) 0.125 MG SL Tab DISSOLVE 1 2 TABLETS UNDER THE TONGUE EVERY 6 HOURS AS NEEDED FOR PAIN (NOT COVERED)     • metroNIDAZOLE (FLAGYL) 250 MG Tab TAKE 1 TABLET BY MOUTH 3 TIMES A DAY FOR 10 DAYS     • sertraline (ZOLOFT) 50 MG Tab TAKE 1  "TABLET BY MOUTH EVERY DAY 90 Tab 3   • raloxifene (EVISTA) 60 MG Tab TAKE 1 TABLET BY MOUTH EVERY DAY 90 Tab 3   • Turmeric Powder by Does not apply route.     • vitamin D (CHOLECALCIFEROL) 1000 UNIT Tab Take 1,000 Units by mouth every day.     • ciprofloxacin (CIPRO) 500 MG Tab Take 500 mg by mouth. FOR 10 DAYS     • pantoprazole (PROTONIX) 40 MG Tablet Delayed Response TAKE 1 TABLET BY MOUTH ONCE A DAY 30 MINUTES BEFORE BREAKFAST MEAL     • metoprolol SR (TOPROL XL) 100 MG TABLET SR 24 HR TAKE 1 TABLET BY MOUTH EVERY DAY (Patient not taking: Reported on 12/21/2020) 90 Tab 2     No current facility-administered medications for this visit.        Patient Active Problem List    Diagnosis Date Noted   • Essential hypertension 03/12/2019     Priority: High   • Dyslipidemia 03/12/2019     Priority: High   • Shortness of breath 04/11/2019     Priority: Medium   • Anxiety 03/12/2019     Priority: Medium   • Chest discomfort 03/12/2019     Priority: Low   • Generalized abdominal pain 12/21/2020   • Right sided abdominal pain 03/03/2020   • Sebaceous cyst 03/03/2020   • Urge incontinence of urine 04/12/2019   • Hair pulling 03/12/2019   • Age-related osteoporosis without current pathological fracture 03/12/2019   • Other fatigue 03/12/2019       Family History   Problem Relation Age of Onset   • Heart Disease Mother 65   • Cancer Father    • Heart Disease Brother        She  has a past medical history of ASTHMA, Chest discomfort (04/2019), COPD, Hyperlipidemia, Hypertension, OSTEOPOROSIS, and Shortness of breath (05/2019).  She  has a past surgical history that includes abdominal hysterectomy total and appendectomy.       Objective:   /80 (BP Location: Left arm, Patient Position: Sitting, BP Cuff Size: Adult long)   Temp 37 °C (98.6 °F) (Temporal)   Ht 1.575 m (5' 2\")   Wt 56.2 kg (124 lb)   BMI 22.68 kg/m²     Physical Exam:  General: Normal appearing. No distress.  HEAD: NCAT  EYES: conjunctiva clear, lids " without ptosis, pupils equal and reactive to light  EARS: ears normal shape and contour.  MOUTH: normal dentition   Neck:  Normal ROM  Pulmonary: Normal effort. Normal respiratory rate.  Cardiovascular: Well perfused.   Neurologic: Grossly normal, no focal deficits  Skin: Warm and dry.  No obvious lesions.  Musculoskeletal: Normal gait and station.   Psych: Normal mood and affect. Alert and oriented x3. Judgment and insight is normal.      Assessment and Plan:   The following treatment plan was discussed:     1. Generalized abdominal pain    2. Bloating    3. Weight loss    4. Hemorrhoids, unspecified hemorrhoid type    Chronic problem.  The patient has been experiencing worsening weight loss bloating and abdominal pain.  She is being seen by gastroenterology, however, states her follow-up appointment is not until March.  I am concerned that she continues to have esophageal stricture given that she is unable to eat anything other than smoothies/blended foods.  Down another 9 pounds since her last visit.  Reviewed imaging, CT abdomen pelvis was normal, ultrasound normal, HIDA scan normal.  I did recommend decreasing the Metamucil and trying a different laxative as I worry that this may be causing bulkier stools.  She reports very large stools.  She is having significant hemorrhoids.  I strongly encouraged patient to contact her gastroenterologist to let them know that she has had continued weight loss and abdominal pain.  I offered referral to surgery but the patient would like to hold off for now.  Plan to follow-up in around a month or 2.    Follow-up: Return if symptoms worsen or fail to improve.

## 2021-01-18 ENCOUNTER — HOSPITAL ENCOUNTER (OUTPATIENT)
Facility: MEDICAL CENTER | Age: 66
End: 2021-01-18
Attending: INTERNAL MEDICINE
Payer: MEDICARE

## 2021-01-18 PROCEDURE — 91065 BREATH HYDROGEN/METHANE TEST: CPT | Mod: 91

## 2021-01-18 PROCEDURE — 36415 COLL VENOUS BLD VENIPUNCTURE: CPT

## 2021-01-25 ENCOUNTER — OFFICE VISIT (OUTPATIENT)
Dept: MEDICAL GROUP | Facility: MEDICAL CENTER | Age: 66
End: 2021-01-25
Payer: MEDICARE

## 2021-01-25 VITALS
WEIGHT: 117 LBS | HEIGHT: 62 IN | HEART RATE: 80 BPM | BODY MASS INDEX: 21.53 KG/M2 | SYSTOLIC BLOOD PRESSURE: 130 MMHG | TEMPERATURE: 97.9 F | DIASTOLIC BLOOD PRESSURE: 80 MMHG | OXYGEN SATURATION: 94 %

## 2021-01-25 DIAGNOSIS — E78.00 PURE HYPERCHOLESTEROLEMIA: ICD-10-CM

## 2021-01-25 DIAGNOSIS — Z12.31 ENCOUNTER FOR SCREENING MAMMOGRAM FOR MALIGNANT NEOPLASM OF BREAST: ICD-10-CM

## 2021-01-25 DIAGNOSIS — R10.84 GENERALIZED ABDOMINAL PAIN: ICD-10-CM

## 2021-01-25 DIAGNOSIS — M81.0 AGE-RELATED OSTEOPOROSIS WITHOUT CURRENT PATHOLOGICAL FRACTURE: ICD-10-CM

## 2021-01-25 DIAGNOSIS — I10 ESSENTIAL HYPERTENSION: ICD-10-CM

## 2021-01-25 DIAGNOSIS — R73.01 ELEVATED FASTING BLOOD SUGAR: ICD-10-CM

## 2021-01-25 PROCEDURE — 99214 OFFICE O/P EST MOD 30 MIN: CPT | Performed by: FAMILY MEDICINE

## 2021-01-25 ASSESSMENT — PATIENT HEALTH QUESTIONNAIRE - PHQ9: CLINICAL INTERPRETATION OF PHQ2 SCORE: 0

## 2021-01-25 ASSESSMENT — FIBROSIS 4 INDEX: FIB4 SCORE: 2.6

## 2021-01-25 NOTE — PROGRESS NOTES
Subjective:     CC: Diagnoses of Generalized abdominal pain, Essential hypertension, Pure hypercholesterolemia, Elevated fasting blood sugar, Encounter for screening mammogram for malignant neoplasm of breast, and Age-related osteoporosis without current pathological fracture were pertinent to this visit.    HPI: Patient is a 65 y.o. female established patient who presents today to fu on weight loss and abd pain.    Bloating  Hemorrhoids  Generalized abdominal pain  Chronic problem. Can only eat bland food. Chicken, rice, bread.   Continues to have weight loss, down another 7 lbs over the past month. Down 27 lbs over the past year.  Continues work up with Dr. Kauffman at [facility].   Had SIBO breath test, has appt to review in 2 weeks   No improvement with hycyamine or PPI.   CT abdomen pelvis was normal, ultrasound normal, HIDA scan normal.    Essential hypertension  Chronic problem.  Stopped taking BP meds. BP ok. Has lost quite a bit of weight due to abd pain.     Age-related osteoporosis without current pathological fracture  Chronic problem. Currently on raloxifene. Due for repeat dexa.      Past Medical History:   Diagnosis Date   • ASTHMA     remote hx   • Chest discomfort 04/2019    Coronary CT scan with LAD 11.4.    • COPD     mild, not on meds for it   • Hyperlipidemia    • Hypertension    • OSTEOPOROSIS    • Shortness of breath 05/2019    Echocardiogram with normal LV size, LVEF 65%.Trace MR. Mild TR. RVSP 30mmHg.       Social History     Tobacco Use   • Smoking status: Former Smoker     Packs/day: 0.00   • Smokeless tobacco: Never Used   • Tobacco comment: 20 y.a   Substance Use Topics   • Alcohol use: Yes     Alcohol/week: 2.4 oz     Types: 4 Glasses of wine per week   • Drug use: No       Current Outpatient Medications Ordered in Epic   Medication Sig Dispense Refill   • sertraline (ZOLOFT) 50 MG Tab TAKE 1 TABLET BY MOUTH EVERY DAY 90 Tab 3   • raloxifene (EVISTA) 60 MG Tab TAKE 1 TABLET BY MOUTH EVERY DAY  "90 Tab 3   • Turmeric Powder by Does not apply route.     • vitamin D (CHOLECALCIFEROL) 1000 UNIT Tab Take 1,000 Units by mouth every day.     • hyoscyamine (LEVSIN) 0.125 MG SL Tab DISSOLVE 1 2 TABLETS UNDER THE TONGUE EVERY 6 HOURS AS NEEDED FOR PAIN (NOT COVERED)       No current Epic-ordered facility-administered medications on file.        Allergies:  Pcn [penicillins] and Sulfa drugs    Health Maintenance: Completed    ROS:  Pulm: no sob, no cough  CV: no chest pain, no palpitations        Objective:       Exam:  /80 (BP Location: Left arm, Patient Position: Sitting, BP Cuff Size: Adult long)   Pulse 80   Temp 36.6 °C (97.9 °F) (Temporal)   Ht 1.575 m (5' 2\")   Wt 53.1 kg (117 lb)   SpO2 94%   BMI 21.40 kg/m²  Body mass index is 21.4 kg/m².    General: Normal appearing. No distress.  HEAD: NCAT  EYES: conjunctiva clear, lids without ptosis, pupils equal and reactive to light  EARS: ears normal shape and contour.  MOUTH: normal dentition   Neck:  Normal ROM  Pulmonary: Clear to auscultation bilaterally, no wheezes rales or rhonchi.  Normal effort. Normal respiratory rate.  Cardiovascular: Regular rate and rhythm, no murmurs rubs or gallops.  Well perfused. No LE edema  Abdomen: Soft, nontender, nondistended.  Neurologic: Grossly normal, no focal deficits  Skin: Warm and dry.  No obvious lesions.  Musculoskeletal: Normal gait and station.   Psych: Normal mood and affect. Alert and oriented x3. Judgment and insight is normal.    Labs: 7/9/2020 results reviewed and discussed with the patient, questions answered.    Assessment & Plan:     65 y.o. female with the following -     1. Generalized abdominal pain  Chronic problem, uncontrolled.  Continues to lose weight, down another 7 pounds over the past month.  She has appointment with her GI doctor in 2 weeks to review her breath test results.  We will request records.  - CBC WITH DIFFERENTIAL; Future    2. Essential hypertension  Chronic problem, off of " all medications blood pressure is moderately well controlled at 130/80, likely due to recent weight loss.  Plan to continue monitor.  - Comp Metabolic Panel; Future  - Lipid Profile; Future  - CBC WITH DIFFERENTIAL; Future    3. Pure hypercholesterolemia  Chronic problem, not on a statin.  ASCVD risk is 7.7%.  Ordered the following labs.  - Comp Metabolic Panel; Future  - Lipid Profile; Future    4. Elevated fasting blood sugar  - HEMOGLOBIN A1C; Future    5. Encounter for screening mammogram for malignant neoplasm of breast  - MA-SCREENING MAMMO BILAT W/CAD; Future    6. Age-related osteoporosis without current pathological fracture  Chronic problem.  Repeat DEXA ordered.  Currently on raloxifene.  - DS-BONE DENSITY STUDY (DEXA); Future      Return in about 2 weeks (around 2/8/2021).    Please note that this dictation was created using voice recognition software. I have made every reasonable attempt to correct obvious errors, but I expect that there are errors of grammar and possibly content that I did not discover before finalizing the note.

## 2021-01-25 NOTE — ASSESSMENT & PLAN NOTE
Chronic problem. Can only eat bland food. Chicken, rice, bread.   Continues to have weight loss, down another 7 lbs over the past month.  Continues work up with Dr. Kauffman at [facility].   Had SIBO breath test, has appt to review in 2 weeks   No improvement with hycyamine or PPI.

## 2021-02-03 LAB — CANCELLED TEST NOTIFICATION CANTE: NORMAL

## 2021-02-22 ENCOUNTER — HOSPITAL ENCOUNTER (OUTPATIENT)
Dept: LAB | Facility: MEDICAL CENTER | Age: 66
End: 2021-02-22
Attending: FAMILY MEDICINE
Payer: MEDICARE

## 2021-02-22 ENCOUNTER — HOSPITAL ENCOUNTER (OUTPATIENT)
Dept: LAB | Facility: MEDICAL CENTER | Age: 66
End: 2021-02-22
Attending: INTERNAL MEDICINE
Payer: MEDICARE

## 2021-02-22 DIAGNOSIS — R73.01 ELEVATED FASTING BLOOD SUGAR: ICD-10-CM

## 2021-02-22 DIAGNOSIS — E78.00 PURE HYPERCHOLESTEROLEMIA: ICD-10-CM

## 2021-02-22 DIAGNOSIS — R10.84 GENERALIZED ABDOMINAL PAIN: ICD-10-CM

## 2021-02-22 DIAGNOSIS — I10 ESSENTIAL HYPERTENSION: ICD-10-CM

## 2021-02-22 LAB
ALBUMIN SERPL BCP-MCNC: 4.4 G/DL (ref 3.2–4.9)
ALBUMIN/GLOB SERPL: 1.9 G/DL
ALP SERPL-CCNC: 45 U/L (ref 30–99)
ALT SERPL-CCNC: 29 U/L (ref 2–50)
ANION GAP SERPL CALC-SCNC: 8 MMOL/L (ref 7–16)
AST SERPL-CCNC: 27 U/L (ref 12–45)
BASOPHILS # BLD AUTO: 0.3 % (ref 0–1.8)
BASOPHILS # BLD: 0.01 K/UL (ref 0–0.12)
BILIRUB SERPL-MCNC: 0.4 MG/DL (ref 0.1–1.5)
BUN SERPL-MCNC: 15 MG/DL (ref 8–22)
CALCIUM SERPL-MCNC: 9.2 MG/DL (ref 8.4–10.2)
CHLORIDE SERPL-SCNC: 102 MMOL/L (ref 96–112)
CHOLEST SERPL-MCNC: 189 MG/DL (ref 100–199)
CO2 SERPL-SCNC: 29 MMOL/L (ref 20–33)
CREAT SERPL-MCNC: 0.68 MG/DL (ref 0.5–1.4)
EOSINOPHIL # BLD AUTO: 0.07 K/UL (ref 0–0.51)
EOSINOPHIL NFR BLD: 2.3 % (ref 0–6.9)
ERYTHROCYTE [DISTWIDTH] IN BLOOD BY AUTOMATED COUNT: 45.3 FL (ref 35.9–50)
EST. AVERAGE GLUCOSE BLD GHB EST-MCNC: 105 MG/DL
GLOBULIN SER CALC-MCNC: 2.3 G/DL (ref 1.9–3.5)
GLUCOSE SERPL-MCNC: 99 MG/DL (ref 65–99)
HBA1C MFR BLD: 5.3 % (ref 0–5.6)
HCT VFR BLD AUTO: 43.5 % (ref 37–47)
HDLC SERPL-MCNC: 67 MG/DL
HGB BLD-MCNC: 14.6 G/DL (ref 12–16)
IMM GRANULOCYTES # BLD AUTO: 0 K/UL (ref 0–0.11)
IMM GRANULOCYTES NFR BLD AUTO: 0 % (ref 0–0.9)
LDLC SERPL CALC-MCNC: 103 MG/DL
LYMPHOCYTES # BLD AUTO: 0.85 K/UL (ref 1–4.8)
LYMPHOCYTES NFR BLD: 28 % (ref 22–41)
MCH RBC QN AUTO: 30.1 PG (ref 27–33)
MCHC RBC AUTO-ENTMCNC: 33.6 G/DL (ref 33.6–35)
MCV RBC AUTO: 89.7 FL (ref 81.4–97.8)
MONOCYTES # BLD AUTO: 0.27 K/UL (ref 0–0.85)
MONOCYTES NFR BLD AUTO: 8.9 % (ref 0–13.4)
NEUTROPHILS # BLD AUTO: 1.84 K/UL (ref 2–7.15)
NEUTROPHILS NFR BLD: 60.5 % (ref 44–72)
NRBC # BLD AUTO: 0 K/UL
NRBC BLD-RTO: 0 /100 WBC
PLATELET # BLD AUTO: 216 K/UL (ref 164–446)
PMV BLD AUTO: 9.9 FL (ref 9–12.9)
POTASSIUM SERPL-SCNC: 4 MMOL/L (ref 3.6–5.5)
PROT SERPL-MCNC: 6.7 G/DL (ref 6–8.2)
RBC # BLD AUTO: 4.85 M/UL (ref 4.2–5.4)
SODIUM SERPL-SCNC: 139 MMOL/L (ref 135–145)
T3FREE SERPL-MCNC: 2.44 PG/ML (ref 2–4.4)
T4 SERPL-MCNC: 5.5 UG/DL (ref 4–12)
TRIGL SERPL-MCNC: 93 MG/DL (ref 0–149)
TSH SERPL DL<=0.005 MIU/L-ACNC: 1.02 UIU/ML (ref 0.38–5.33)
WBC # BLD AUTO: 3 K/UL (ref 4.8–10.8)

## 2021-02-22 PROCEDURE — 84443 ASSAY THYROID STIM HORMONE: CPT

## 2021-02-22 PROCEDURE — 83036 HEMOGLOBIN GLYCOSYLATED A1C: CPT

## 2021-02-22 PROCEDURE — 80053 COMPREHEN METABOLIC PANEL: CPT

## 2021-02-22 PROCEDURE — 84436 ASSAY OF TOTAL THYROXINE: CPT

## 2021-02-22 PROCEDURE — 85025 COMPLETE CBC W/AUTO DIFF WBC: CPT

## 2021-02-22 PROCEDURE — 36415 COLL VENOUS BLD VENIPUNCTURE: CPT

## 2021-02-22 PROCEDURE — 84481 FREE ASSAY (FT-3): CPT

## 2021-02-22 PROCEDURE — 86753 PROTOZOA ANTIBODY NOS: CPT

## 2021-02-22 PROCEDURE — 80061 LIPID PANEL: CPT

## 2021-02-25 LAB — T CRUZI IGG SER-ACNC: 0.3 IV

## 2021-03-03 DIAGNOSIS — Z23 NEED FOR VACCINATION: ICD-10-CM

## 2021-03-08 ENCOUNTER — HOSPITAL ENCOUNTER (OUTPATIENT)
Facility: MEDICAL CENTER | Age: 66
End: 2021-03-09
Attending: EMERGENCY MEDICINE | Admitting: STUDENT IN AN ORGANIZED HEALTH CARE EDUCATION/TRAINING PROGRAM
Payer: MEDICARE

## 2021-03-08 DIAGNOSIS — K62.89 RECTAL PAIN: ICD-10-CM

## 2021-03-08 LAB
ALBUMIN SERPL BCP-MCNC: 4.6 G/DL (ref 3.2–4.9)
ALBUMIN/GLOB SERPL: 1.7 G/DL
ALP SERPL-CCNC: 50 U/L (ref 30–99)
ALT SERPL-CCNC: 26 U/L (ref 2–50)
ANION GAP SERPL CALC-SCNC: 11 MMOL/L (ref 7–16)
AST SERPL-CCNC: 27 U/L (ref 12–45)
BASOPHILS # BLD AUTO: 0.2 % (ref 0–1.8)
BASOPHILS # BLD: 0.01 K/UL (ref 0–0.12)
BILIRUB SERPL-MCNC: 0.6 MG/DL (ref 0.1–1.5)
BUN SERPL-MCNC: 16 MG/DL (ref 8–22)
CALCIUM SERPL-MCNC: 10.4 MG/DL (ref 8.5–10.5)
CHLORIDE SERPL-SCNC: 103 MMOL/L (ref 96–112)
CO2 SERPL-SCNC: 25 MMOL/L (ref 20–33)
CREAT SERPL-MCNC: 0.66 MG/DL (ref 0.5–1.4)
EOSINOPHIL # BLD AUTO: 0.06 K/UL (ref 0–0.51)
EOSINOPHIL NFR BLD: 1.3 % (ref 0–6.9)
ERYTHROCYTE [DISTWIDTH] IN BLOOD BY AUTOMATED COUNT: 44.3 FL (ref 35.9–50)
GLOBULIN SER CALC-MCNC: 2.7 G/DL (ref 1.9–3.5)
GLUCOSE SERPL-MCNC: 98 MG/DL (ref 65–99)
HCT VFR BLD AUTO: 37.9 % (ref 37–47)
HGB BLD-MCNC: 13.1 G/DL (ref 12–16)
IMM GRANULOCYTES # BLD AUTO: 0.01 K/UL (ref 0–0.11)
IMM GRANULOCYTES NFR BLD AUTO: 0.2 % (ref 0–0.9)
LYMPHOCYTES # BLD AUTO: 1.62 K/UL (ref 1–4.8)
LYMPHOCYTES NFR BLD: 36.4 % (ref 22–41)
MCH RBC QN AUTO: 30.7 PG (ref 27–33)
MCHC RBC AUTO-ENTMCNC: 34.6 G/DL (ref 33.6–35)
MCV RBC AUTO: 88.8 FL (ref 81.4–97.8)
MONOCYTES # BLD AUTO: 0.6 K/UL (ref 0–0.85)
MONOCYTES NFR BLD AUTO: 13.5 % (ref 0–13.4)
NEUTROPHILS # BLD AUTO: 2.15 K/UL (ref 2–7.15)
NEUTROPHILS NFR BLD: 48.4 % (ref 44–72)
NRBC # BLD AUTO: 0 K/UL
NRBC BLD-RTO: 0 /100 WBC
PLATELET # BLD AUTO: 217 K/UL (ref 164–446)
PMV BLD AUTO: 10.4 FL (ref 9–12.9)
POTASSIUM SERPL-SCNC: 3.8 MMOL/L (ref 3.6–5.5)
PROT SERPL-MCNC: 7.3 G/DL (ref 6–8.2)
RBC # BLD AUTO: 4.27 M/UL (ref 4.2–5.4)
SARS-COV+SARS-COV-2 AG RESP QL IA.RAPID: NOTDETECTED
SODIUM SERPL-SCNC: 139 MMOL/L (ref 135–145)
SPECIMEN SOURCE: NORMAL
WBC # BLD AUTO: 4.5 K/UL (ref 4.8–10.8)

## 2021-03-08 PROCEDURE — U0005 INFEC AGEN DETEC AMPLI PROBE: HCPCS

## 2021-03-08 PROCEDURE — 87426 SARSCOV CORONAVIRUS AG IA: CPT

## 2021-03-08 PROCEDURE — 99285 EMERGENCY DEPT VISIT HI MDM: CPT

## 2021-03-08 PROCEDURE — 700111 HCHG RX REV CODE 636 W/ 250 OVERRIDE (IP): Performed by: EMERGENCY MEDICINE

## 2021-03-08 PROCEDURE — 85025 COMPLETE CBC W/AUTO DIFF WBC: CPT

## 2021-03-08 PROCEDURE — G0378 HOSPITAL OBSERVATION PER HR: HCPCS

## 2021-03-08 PROCEDURE — 700111 HCHG RX REV CODE 636 W/ 250 OVERRIDE (IP): Performed by: STUDENT IN AN ORGANIZED HEALTH CARE EDUCATION/TRAINING PROGRAM

## 2021-03-08 PROCEDURE — U0003 INFECTIOUS AGENT DETECTION BY NUCLEIC ACID (DNA OR RNA); SEVERE ACUTE RESPIRATORY SYNDROME CORONAVIRUS 2 (SARS-COV-2) (CORONAVIRUS DISEASE [COVID-19]), AMPLIFIED PROBE TECHNIQUE, MAKING USE OF HIGH THROUGHPUT TECHNOLOGIES AS DESCRIBED BY CMS-2020-01-R: HCPCS

## 2021-03-08 PROCEDURE — 80053 COMPREHEN METABOLIC PANEL: CPT

## 2021-03-08 PROCEDURE — 96374 THER/PROPH/DIAG INJ IV PUSH: CPT

## 2021-03-08 PROCEDURE — 96375 TX/PRO/DX INJ NEW DRUG ADDON: CPT

## 2021-03-08 PROCEDURE — C9803 HOPD COVID-19 SPEC COLLECT: HCPCS | Performed by: EMERGENCY MEDICINE

## 2021-03-08 RX ORDER — LABETALOL HYDROCHLORIDE 5 MG/ML
10 INJECTION, SOLUTION INTRAVENOUS EVERY 6 HOURS PRN
Status: DISCONTINUED | OUTPATIENT
Start: 2021-03-08 | End: 2021-03-09 | Stop reason: HOSPADM

## 2021-03-08 RX ORDER — MORPHINE SULFATE 4 MG/ML
1 INJECTION, SOLUTION INTRAMUSCULAR; INTRAVENOUS EVERY 4 HOURS PRN
Status: DISCONTINUED | OUTPATIENT
Start: 2021-03-08 | End: 2021-03-09 | Stop reason: HOSPADM

## 2021-03-08 RX ADMIN — FENTANYL CITRATE 50 MCG: 50 INJECTION, SOLUTION INTRAMUSCULAR; INTRAVENOUS at 21:06

## 2021-03-08 RX ADMIN — MORPHINE SULFATE 1 MG: 4 INJECTION INTRAVENOUS at 22:37

## 2021-03-08 ASSESSMENT — LIFESTYLE VARIABLES
CONSUMPTION TOTAL: NEGATIVE
EVER HAD A DRINK FIRST THING IN THE MORNING TO STEADY YOUR NERVES TO GET RID OF A HANGOVER: NO
ON A TYPICAL DAY WHEN YOU DRINK ALCOHOL HOW MANY DRINKS DO YOU HAVE: 0
HAVE YOU EVER FELT YOU SHOULD CUT DOWN ON YOUR DRINKING: NO
DO YOU DRINK ALCOHOL: NO
DOES PATIENT WANT TO STOP DRINKING: NO
HOW MANY TIMES IN THE PAST YEAR HAVE YOU HAD 5 OR MORE DRINKS IN A DAY: 0
TOTAL SCORE: 0
AVERAGE NUMBER OF DAYS PER WEEK YOU HAVE A DRINK CONTAINING ALCOHOL: 0
EVER FELT BAD OR GUILTY ABOUT YOUR DRINKING: NO
HAVE PEOPLE ANNOYED YOU BY CRITICIZING YOUR DRINKING: NO

## 2021-03-08 ASSESSMENT — FIBROSIS 4 INDEX: FIB4 SCORE: 1.51

## 2021-03-09 VITALS
TEMPERATURE: 97.1 F | RESPIRATION RATE: 16 BRPM | WEIGHT: 109 LBS | BODY MASS INDEX: 20.06 KG/M2 | DIASTOLIC BLOOD PRESSURE: 71 MMHG | OXYGEN SATURATION: 99 % | SYSTOLIC BLOOD PRESSURE: 120 MMHG | HEIGHT: 62 IN | HEART RATE: 85 BPM

## 2021-03-09 PROBLEM — K62.89 RECTAL PAIN: Status: ACTIVE | Noted: 2021-03-09

## 2021-03-09 PROBLEM — K62.3 RECTAL PROLAPSE: Status: ACTIVE | Noted: 2021-03-09

## 2021-03-09 LAB
ANION GAP SERPL CALC-SCNC: 10 MMOL/L (ref 7–16)
BASOPHILS # BLD AUTO: 0 % (ref 0–1.8)
BASOPHILS # BLD: 0 K/UL (ref 0–0.12)
BUN SERPL-MCNC: 14 MG/DL (ref 8–22)
CALCIUM SERPL-MCNC: 9.4 MG/DL (ref 8.5–10.5)
CHLORIDE SERPL-SCNC: 103 MMOL/L (ref 96–112)
CO2 SERPL-SCNC: 28 MMOL/L (ref 20–33)
CREAT SERPL-MCNC: 0.65 MG/DL (ref 0.5–1.4)
EOSINOPHIL # BLD AUTO: 0.12 K/UL (ref 0–0.51)
EOSINOPHIL NFR BLD: 4.2 % (ref 0–6.9)
ERYTHROCYTE [DISTWIDTH] IN BLOOD BY AUTOMATED COUNT: 44.4 FL (ref 35.9–50)
GLUCOSE SERPL-MCNC: 96 MG/DL (ref 65–99)
HCT VFR BLD AUTO: 39.2 % (ref 37–47)
HGB BLD-MCNC: 13.6 G/DL (ref 12–16)
IMM GRANULOCYTES # BLD AUTO: 0 K/UL (ref 0–0.11)
IMM GRANULOCYTES NFR BLD AUTO: 0 % (ref 0–0.9)
LYMPHOCYTES # BLD AUTO: 1.27 K/UL (ref 1–4.8)
LYMPHOCYTES NFR BLD: 43.9 % (ref 22–41)
MCH RBC QN AUTO: 31.1 PG (ref 27–33)
MCHC RBC AUTO-ENTMCNC: 34.7 G/DL (ref 33.6–35)
MCV RBC AUTO: 89.5 FL (ref 81.4–97.8)
MONOCYTES # BLD AUTO: 0.32 K/UL (ref 0–0.85)
MONOCYTES NFR BLD AUTO: 11.1 % (ref 0–13.4)
NEUTROPHILS # BLD AUTO: 1.18 K/UL (ref 2–7.15)
NEUTROPHILS NFR BLD: 40.8 % (ref 44–72)
NRBC # BLD AUTO: 0 K/UL
NRBC BLD-RTO: 0 /100 WBC
PLATELET # BLD AUTO: 198 K/UL (ref 164–446)
PMV BLD AUTO: 10.4 FL (ref 9–12.9)
POTASSIUM SERPL-SCNC: 4.3 MMOL/L (ref 3.6–5.5)
RBC # BLD AUTO: 4.38 M/UL (ref 4.2–5.4)
SARS-COV-2 RNA RESP QL NAA+PROBE: NOTDETECTED
SODIUM SERPL-SCNC: 141 MMOL/L (ref 135–145)
SPECIMEN SOURCE: NORMAL
WBC # BLD AUTO: 2.9 K/UL (ref 4.8–10.8)

## 2021-03-09 PROCEDURE — 80048 BASIC METABOLIC PNL TOTAL CA: CPT

## 2021-03-09 PROCEDURE — 700102 HCHG RX REV CODE 250 W/ 637 OVERRIDE(OP): Performed by: SURGERY

## 2021-03-09 PROCEDURE — 85025 COMPLETE CBC W/AUTO DIFF WBC: CPT

## 2021-03-09 PROCEDURE — 99220 PR INITIAL OBSERVATION CARE,LEVL III: CPT | Performed by: STUDENT IN AN ORGANIZED HEALTH CARE EDUCATION/TRAINING PROGRAM

## 2021-03-09 PROCEDURE — 700101 HCHG RX REV CODE 250: Performed by: SURGERY

## 2021-03-09 PROCEDURE — 700102 HCHG RX REV CODE 250 W/ 637 OVERRIDE(OP): Performed by: STUDENT IN AN ORGANIZED HEALTH CARE EDUCATION/TRAINING PROGRAM

## 2021-03-09 PROCEDURE — A9270 NON-COVERED ITEM OR SERVICE: HCPCS | Performed by: SURGERY

## 2021-03-09 PROCEDURE — G0378 HOSPITAL OBSERVATION PER HR: HCPCS

## 2021-03-09 PROCEDURE — A9270 NON-COVERED ITEM OR SERVICE: HCPCS | Performed by: STUDENT IN AN ORGANIZED HEALTH CARE EDUCATION/TRAINING PROGRAM

## 2021-03-09 RX ORDER — OXYCODONE HYDROCHLORIDE 5 MG/1
5 TABLET ORAL EVERY 6 HOURS PRN
Status: DISCONTINUED | OUTPATIENT
Start: 2021-03-09 | End: 2021-03-09 | Stop reason: HOSPADM

## 2021-03-09 RX ORDER — POLYETHYLENE GLYCOL 3350 17 G/17G
17 POWDER, FOR SOLUTION ORAL DAILY
Qty: 30 EACH | Refills: 1 | Status: SHIPPED | OUTPATIENT
Start: 2021-03-10 | End: 2022-12-29

## 2021-03-09 RX ORDER — DIAPER,BRIEF,INFANT-TODD,DISP
EACH MISCELLANEOUS 2 TIMES DAILY
Status: DISCONTINUED | OUTPATIENT
Start: 2021-03-09 | End: 2021-03-09 | Stop reason: HOSPADM

## 2021-03-09 RX ORDER — DOCUSATE SODIUM 100 MG/1
100 CAPSULE, LIQUID FILLED ORAL 2 TIMES DAILY
Status: DISCONTINUED | OUTPATIENT
Start: 2021-03-09 | End: 2021-03-09 | Stop reason: HOSPADM

## 2021-03-09 RX ORDER — PSEUDOEPHEDRINE HCL 30 MG
100 TABLET ORAL 2 TIMES DAILY
Qty: 60 CAPSULE | Refills: 1 | Status: SHIPPED | OUTPATIENT
Start: 2021-03-09 | End: 2022-12-29

## 2021-03-09 RX ORDER — AMLODIPINE BESYLATE 2.5 MG/1
2.5 TABLET ORAL DAILY
Qty: 30 TABLET | Refills: 1 | Status: SHIPPED | OUTPATIENT
Start: 2021-03-10 | End: 2021-04-12 | Stop reason: SDUPTHER

## 2021-03-09 RX ORDER — DIAPER,BRIEF,INFANT-TODD,DISP
1 EACH MISCELLANEOUS 2 TIMES DAILY
Qty: 25 G | Refills: 1 | Status: SHIPPED | OUTPATIENT
Start: 2021-03-09 | End: 2021-03-16

## 2021-03-09 RX ORDER — LIDOCAINE HYDROCHLORIDE 20 MG/ML
JELLY TOPICAL EVERY 6 HOURS PRN
Status: DISCONTINUED | OUTPATIENT
Start: 2021-03-09 | End: 2021-03-09 | Stop reason: HOSPADM

## 2021-03-09 RX ORDER — AMLODIPINE BESYLATE 5 MG/1
2.5 TABLET ORAL
Status: DISCONTINUED | OUTPATIENT
Start: 2021-03-09 | End: 2021-03-09 | Stop reason: HOSPADM

## 2021-03-09 RX ORDER — POLYETHYLENE GLYCOL 3350 17 G/17G
1 POWDER, FOR SOLUTION ORAL DAILY
Status: DISCONTINUED | OUTPATIENT
Start: 2021-03-09 | End: 2021-03-09 | Stop reason: HOSPADM

## 2021-03-09 RX ADMIN — PSYLLIUM HUSK 1 PACKET: 3.4 POWDER ORAL at 10:43

## 2021-03-09 RX ADMIN — AMLODIPINE BESYLATE 2.5 MG: 5 TABLET ORAL at 00:46

## 2021-03-09 RX ADMIN — DOCUSATE SODIUM 100 MG: 100 CAPSULE, LIQUID FILLED ORAL at 10:41

## 2021-03-09 RX ADMIN — POLYETHYLENE GLYCOL 3350 1 PACKET: 17 POWDER, FOR SOLUTION ORAL at 10:42

## 2021-03-09 RX ADMIN — HYDROCORTISONE: 1 OINTMENT TOPICAL at 11:15

## 2021-03-09 ASSESSMENT — PAIN DESCRIPTION - PAIN TYPE: TYPE: ACUTE PAIN

## 2021-03-09 ASSESSMENT — ENCOUNTER SYMPTOMS
NAUSEA: 1
HEADACHES: 1
VOMITING: 1
ROS GI COMMENTS: RECTAL PAIN

## 2021-03-09 ASSESSMENT — PATIENT HEALTH QUESTIONNAIRE - PHQ9
SUM OF ALL RESPONSES TO PHQ9 QUESTIONS 1 AND 2: 0
1. LITTLE INTEREST OR PLEASURE IN DOING THINGS: NOT AT ALL
2. FEELING DOWN, DEPRESSED, IRRITABLE, OR HOPELESS: NOT AT ALL

## 2021-03-09 NOTE — PROGRESS NOTES
Pt A&O x4. Pt reports 4/10 rectal hemorrhoid pain, declines pain medication at this time. Respirations even and unlabored on room air.   Updated on POC, communication board updated. Bed locked and in lowest position. Call light and belongings within reach. Non-skid socks in place. Needs met, will continue to monitor.

## 2021-03-09 NOTE — PROGRESS NOTES
Pt states personal belongings are in possession.  Pt escorted off unit by tech without incident.

## 2021-03-09 NOTE — DISCHARGE PLANNING
Care Transition Team Assessment    Attempted to speak with patient but patient actively vomiting. Chart reviewed. Lives with spouse in single story house. PCP Marija Baker. Has SCP insurance. Unknown needs @ present time.    Information Source  Information Given By: Other (Comments)(Chart reviewed)    Readmission Evaluation  Is this a readmission?: No    Interdisciplinary Discharge Planning  Primary Care Physician: Marija Zamarripa  Lives with - Patient's Self Care Capacity: Spouse  Patient or legal guardian wants to designate a caregiver: No  Support Systems: Spouse / Significant Other  Housing / Facility: 1 Story House  Able to Return to Previous ADL's: Yes  Mobility Issues: No  Prior Services: None  Assistance Needed: Unknown at this Time  Durable Medical Equipment: Not Applicable    Discharge Preparedness  What are your discharge supports?: Spouse    Finances  Prescription Coverage: Yes    Anticipated Discharge Information  Discharge Address: [address]  Discharge Contact Phone Number: [phone]

## 2021-03-09 NOTE — PROGRESS NOTES
IV dc'd.  Discharge instructions given to patient; patient verbalizes understanding, all questions answered.  Copy of DC summary provided, signed copy in chart.  4 prescriptions electronically sent to pt's pharmacy. Pt awaiting ride

## 2021-03-09 NOTE — DISCHARGE SUMMARY
Discharge Summary    CHIEF COMPLAINT ON ADMISSION  Chief Complaint   Patient presents with   • Rectal Pain     x 4-5 days, bleeding, unable to sit down comfortably. hx hemorrhoids     Reason for Admission  Rectal pain    Admission Date  3/8/2021    CODE STATUS  Full Code    HPI & HOSPITAL COURSE  Ms. Egan is a 65-year-old female with a past medical history of hemorrhoids who presented to the emergency department on 3/8/2021 with complaints of rectal pain x4-5 days.  She reports losing approximately 40 pounds due to nausea, vomiting, and bloating over the last 2 months, though upon further examination she states she has not eaten since February 2020.  She was found to have what appeared to be a prolapsed rectum so general surgery was consulted and recommended placing sugar on the prolapse to see if that would cause retraction.  That was largely successful and was nearly successful upon evaluation by general surgery who did not believe it to be a rectal prolapse but instead diagnosed as hemorrhoids.  They recommended no surgical intervention and instead placed her on topical steroids x7 days, ice packs to the affected area, continuation of a high-fiber diet, and scheduled MiraLAX and colace.  She does follow with [facility] and has an appointment with them for colonoscopy on Friday which we have encouraged she keep.  Of additional note, she does have leukopenia which according to her is not new but has not been worked up in the past.  This can be worked up in the outpatient setting through her PCP.  On the day of discharge her vital signs are stable, she is independently ambulatory around the unit, and she is medically clear for discharge home today with the medication changes as listed below.    Therefore, she is discharged in good and stable condition to home with close outpatient follow-up.    Discharge Date  3/9/2021    FOLLOW UP ITEMS POST DISCHARGE  PCP in 5-7 days  GI on Friday as already  scheduled  Surgery as indicated below    DISCHARGE DIAGNOSES  Principal Problem:    Rectal pain POA: Yes  Active Problems:    Rectal prolapse vs prolapsed hemorroids  POA: Yes    Essential hypertension POA: Yes  Resolved Problems:    * No resolved hospital problems. *    FOLLOW UP  Future Appointments   Date Time Provider Department Center   4/1/2021  1:45 PM S [facility] BD 1 [facility]   4/1/2021  2:20 PM S [facility]EN[facility] MG 1 [facility]     Estela Kenney M.D.  [address] [facility] [address]  [facility] [address]  [phone]    Schedule an appointment as soon as possible for a visit in 2 weeks    MEDICATIONS ON DISCHARGE     Medication List      Start taking these medications      Instructions   amLODIPine 2.5 MG Tabs  Start taking on: March 10, 2021  Commonly known as: NORVASC   Take 1 tablet by mouth every day.  Dose: 2.5 mg     docusate sodium 100 MG Caps   Take 100 mg by mouth 2 Times a Day.  Dose: 100 mg     hydrocortisone 1 % Oint   Apply 1 Application topically 2 Times a Day for 7 days.  Dose: 1 Application     polyethylene glycol/lytes 17 g Pack  Start taking on: March 10, 2021  Commonly known as: MIRALAX   Take 1 Packet by mouth every day.  Dose: 17 g     psyllium 58.6 % Pack  Commonly known as: METAMUCIL   Take 1 Packet by mouth every day.  Dose: 1 Packet        Continue taking these medications      Instructions   glycopyrrolate 2 MG tablet  Commonly known as: ROBINUL      hyoscyamine 0.125 MG Subl  Commonly known as: Levsin   DISSOLVE 1 2 TABLETS UNDER THE TONGUE EVERY 6 HOURS AS NEEDED FOR PAIN (NOT COVERED)     metroNIDAZOLE 250 MG Tabs  Commonly known as: FLAGYL      neomycin 500 MG Tabs  Commonly known as: MYCIFRADIN      raloxifene 60 MG Tabs  Commonly known as: EVISTA   TAKE 1 TABLET BY MOUTH EVERY DAY     sertraline 50 MG Tabs  Commonly known as: Zoloft   TAKE 1 TABLET BY MOUTH EVERY DAY     Turmeric Powd   by Does not apply route.     vitamin D 1000 Unit (25 mcg) Tabs  Commonly known as:  cholecalciferol   Take 1,000 Units by mouth every day.  Dose: 1,000 Units          Allergies  Allergies   Allergen Reactions   • Pcn [Penicillins]    • Sulfa Drugs Rash     7/15/19 welting urticaria/rash after taking Bactrim      DIET  Orders Placed This Encounter   Procedures   • Diet Order Diet: Regular; Nutrient modifications: (optional): High Fiber     Standing Status:   Standing     Number of Occurrences:   1     Order Specific Question:   Diet:     Answer:   Regular [1]     Order Specific Question:   Nutrient modifications: (optional)     Answer:   High Fiber [8]     ACTIVITY  As tolerated.  Weight bearing as tolerated    CONSULTATIONS  Colorectal surgery-Dr. Kenney    PROCEDURES  None    LABORATORY  Lab Results   Component Value Date    SODIUM 141 03/09/2021    POTASSIUM 4.3 03/09/2021    CHLORIDE 103 03/09/2021    CO2 28 03/09/2021    GLUCOSE 96 03/09/2021    BUN 14 03/09/2021    CREATININE 0.65 03/09/2021      Lab Results   Component Value Date    WBC 2.9 (L) 03/09/2021    HEMOGLOBIN 13.6 03/09/2021    HEMATOCRIT 39.2 03/09/2021    PLATELETCT 198 03/09/2021      Total time of the discharge process exceeds 32 minutes.

## 2021-03-09 NOTE — CONSULTS
CONSULT NOTE    PATIENT ID  Name:             Funmilayo Egan   YOB: 1955  Age:                 65 y.o.  female   MRN:               [id number]    CHIEF COMPLAINT:        Headache, abdominal pain, painful hemorrhoids    HISTORY OF PRESENT ILLNESS:  65-year-old female with a longstanding history of hemorrhoids.  She reports pain associated with her hemorrhoids for the past few months but had been avoiding care due to concerns related to Covid.  She reports that when she has hemorrhoidal pain she also experiences a headache as well as abdominal pain.  She has previously undergone significant work-up with gastroenterology with regards to GI complaints.  She reports a previous upper endoscopy with what sounds like biopsies of the esophagus and possible dilation.  She also reports a colonoscopy 3 years ago where she had multiple polyps removed.    With regards to her hemorrhoids.  She has intermittent flares causing pain and occasionally associated with bright red blood.  She denies constipation.  She reports that she is taking Metamucil daily.  And has soft bowel movements.    REVIEW OF SYSTEMS:   Constitutional: Denies unintended weight change, night sweats, fatigue  Eyes:   Denies eye pain, redness, discharge, vision changes  Ears/Nose/Throat/Mouth: Denies hearing changes, ear pain, nasal congestion, sore throat, dysphagia  Cardiovascular:  Denies Chest pain, shortness of breath, orthopnea, palpitations, claudication  Respiratory:  Denies cough, sputum, wheezing, dyspnea  Gastrointestinal/Hepatic:  Denies dysphagia, melena, jaundice, hematochezia, changing heartburn  Genitourinary:  Denies dysuria, increased frequency, hematuria, urgency  Musculoskeletal/Rheum: Denies changing arthralgias, myalgias, joint swelling, joint stiffness  Skin/Breast: Denies changing skin lesions, pruritis, nipple discharge, hair changes  Neurological: Denies weakness, numbness, paresthesia, syncope,  dizziness  Pyschiatric: Denies acute depression, anxiety, insomnia, personality changes, delusions  Endocrine: Denies temperature intolerance, polydipsia, polyuria  Heme/Oncology/Lymph Nodes: Denies easy bruising, bleeding, lymphadenopathy  All other systems were reviewed and are negative                 Past Medical History:   Past Medical History:   Diagnosis Date   • ASTHMA     remote hx   • Chest discomfort 04/2019    Coronary CT scan with LAD 11.4.    • COPD     mild, not on meds for it   • Hyperlipidemia    • Hypertension    • OSTEOPOROSIS    • Shortness of breath 05/2019    Echocardiogram with normal LV size, LVEF 65%.Trace MR. Mild TR. RVSP 30mmHg.       Past Surgical History:  Past Surgical History:   Procedure Laterality Date   • ABDOMINAL HYSTERECTOMY TOTAL     • APPENDECTOMY         Current Outpatient Medications:  No current facility-administered medications on file prior to encounter.     Current Outpatient Medications on File Prior to Encounter   Medication Sig Dispense Refill   • glycopyrrolate (ROBINUL) 2 MG tablet      • metroNIDAZOLE (FLAGYL) 250 MG Tab      • neomycin (MYCIFRADIN) 500 MG Tab      • hyoscyamine (LEVSIN) 0.125 MG SL Tab DISSOLVE 1 2 TABLETS UNDER THE TONGUE EVERY 6 HOURS AS NEEDED FOR PAIN (NOT COVERED)     • sertraline (ZOLOFT) 50 MG Tab TAKE 1 TABLET BY MOUTH EVERY DAY 90 Tab 3   • raloxifene (EVISTA) 60 MG Tab TAKE 1 TABLET BY MOUTH EVERY DAY 90 Tab 3   • Turmeric Powder by Does not apply route.     • vitamin D (CHOLECALCIFEROL) 1000 UNIT Tab Take 1,000 Units by mouth every day.         Current Inpatient Medications:   Current Facility-Administered Medications   Medication Last Admin   • amLODIPine (NORVASC) tablet 2.5 mg 2.5 mg at 03/09/21 0046   • oxyCODONE immediate-release (ROXICODONE) tablet 5 mg     • morphine (pf) 4 mg/mL injection 1 mg 1 mg at 03/08/21 2237   • labetalol (NORMODYNE/TRANDATE) injection 10 mg         Medication Allergy/Sensitivities:  Allergies   Allergen  Reactions   • Pcn [Penicillins]    • Sulfa Drugs Rash     7/15/19 welting urticaria/rash after taking Bactrim        Family History:  Family History   Problem Relation Age of Onset   • Heart Disease Mother 65   • Cancer Father    • Heart Disease Brother        Social History:  PCP: Marija Baker M.D.  Social History     Tobacco Use   Smoking Status Former Smoker   • Packs/day: 0.00   Smokeless Tobacco Never Used   Tobacco Comment    20 y.a     Social History     Substance and Sexual Activity   Alcohol Use Not Currently   • Alcohol/week: 2.4 oz   • Types: 4 Glasses of wine per week     Social History     Substance and Sexual Activity   Drug Use No       PHYSICAL EXAM:  Weight/BMI: Body mass index is 19.94 kg/m².  Vitals:    03/08/21 2354 03/09/21 0000 03/09/21 0455 03/09/21 0759   BP: 157/92  142/89 120/71   Pulse:   83 85   Resp:   15 16   Temp:   35.9 °C (96.7 °F) 36.2 °C (97.1 °F)   TempSrc:   Temporal Temporal   SpO2:  99% 100% 99%   Weight:       Height:         Oxygen Therapy:  Pulse Oximetry: 99 %, O2 (LPM): 0, O2 Delivery Device: None - Room Air    Constitutional: Well developed, Well nourished, No acute distress, Non-toxic appearance.    HENMT: Normocephalic, Atraumatic, Bilateral external ears normal, Oropharynx moist mucous membranes, No oral exudates, Nose normal.  No thyromegaly.   Eyes: PERRLA, EOMI, Conjunctiva normal, No discharge.   Neck: Normal range of motion, No cervical tenderness, Supple, No stridor, no JVD.  Cardiovascular: Normal heart rate, Normal rhythm, No murmurs, No rubs, No gallops.   Extremites with intact distal pulses, no cyanosis, clubbing or edema.   Lungs:  Respiratory effort is normal. Normal breath sounds, breath sounds clear to auscultation bilaterally,  no rales, no rhonchi, no wheezing.   Abdomen: Bowel sounds normal, Soft, mild tenderness generally, no distention, no guarding, No rebound, No masses, No hepatosplenomegaly.  Skin: Warm, Dry, No erythema, No rash, no  induration or crepitus.        Neurologic: Alert & oriented x 3, Normal motor function, Normal sensory function, No focal deficits noted, cranial nerves II through XII are normal.  Psychiatric: Affect normal, Judgment normal, Mood normal.     Perineum: To moderate sized external hemorrhoids in the left lateral position.  One appeared to be thrombosed.  These were soft with no evidence of ischemic changes.  There was no evidence of prolapsed internal hemorrhoids.  A digital rectal exam was performed and there were no palpable masses.  There was no evidence of blood on the glove.  Unable to perform a anoscopy due to lack of anoscope in the hospital setting.    LAB DATA REVIEWED:  Recent Results (from the past 24 hour(s))   SARS-COV Antigen DIAN: Collect dry nasal swab AND NP swab in VTM    Collection Time: 03/08/21  8:22 PM   Result Value Ref Range    SARS-CoV-2 Source Nasal Swab     SARS-COV ANTIGEN DIAN NotDetected Not-Detected   SARS-CoV-2 PCR (24 hour In-House): Collect NP swab in VTM    Collection Time: 03/08/21  8:22 PM    Specimen: Nasopharyngeal; Respirate   Result Value Ref Range    SARS-CoV-2 Source NP Swab    CBC WITH DIFFERENTIAL    Collection Time: 03/08/21  9:06 PM   Result Value Ref Range    WBC 4.5 (L) 4.8 - 10.8 K/uL    RBC 4.27 4.20 - 5.40 M/uL    Hemoglobin 13.1 12.0 - 16.0 g/dL    Hematocrit 37.9 37.0 - 47.0 %    MCV 88.8 81.4 - 97.8 fL    MCH 30.7 27.0 - 33.0 pg    MCHC 34.6 33.6 - 35.0 g/dL    RDW 44.3 35.9 - 50.0 fL    Platelet Count 217 164 - 446 K/uL    MPV 10.4 9.0 - 12.9 fL    Neutrophils-Polys 48.40 44.00 - 72.00 %    Lymphocytes 36.40 22.00 - 41.00 %    Monocytes 13.50 (H) 0.00 - 13.40 %    Eosinophils 1.30 0.00 - 6.90 %    Basophils 0.20 0.00 - 1.80 %    Immature Granulocytes 0.20 0.00 - 0.90 %    Nucleated RBC 0.00 /100 WBC    Neutrophils (Absolute) 2.15 2.00 - 7.15 K/uL    Lymphs (Absolute) 1.62 1.00 - 4.80 K/uL    Monos (Absolute) 0.60 0.00 - 0.85 K/uL    Eos (Absolute) 0.06 0.00 - 0.51  K/uL    Baso (Absolute) 0.01 0.00 - 0.12 K/uL    Immature Granulocytes (abs) 0.01 0.00 - 0.11 K/uL    NRBC (Absolute) 0.00 K/uL   COMP METABOLIC PANEL    Collection Time: 03/08/21  9:06 PM   Result Value Ref Range    Sodium 139 135 - 145 mmol/L    Potassium 3.8 3.6 - 5.5 mmol/L    Chloride 103 96 - 112 mmol/L    Co2 25 20 - 33 mmol/L    Anion Gap 11.0 7.0 - 16.0    Glucose 98 65 - 99 mg/dL    Bun 16 8 - 22 mg/dL    Creatinine 0.66 0.50 - 1.40 mg/dL    Calcium 10.4 8.5 - 10.5 mg/dL    AST(SGOT) 27 12 - 45 U/L    ALT(SGPT) 26 2 - 50 U/L    Alkaline Phosphatase 50 30 - 99 U/L    Total Bilirubin 0.6 0.1 - 1.5 mg/dL    Albumin 4.6 3.2 - 4.9 g/dL    Total Protein 7.3 6.0 - 8.2 g/dL    Globulin 2.7 1.9 - 3.5 g/dL    A-G Ratio 1.7 g/dL   ESTIMATED GFR    Collection Time: 03/08/21  9:06 PM   Result Value Ref Range    GFR If African American >60 >60 mL/min/1.73 m 2    GFR If Non African American >60 >60 mL/min/1.73 m 2   CBC with Differential    Collection Time: 03/09/21  6:15 AM   Result Value Ref Range    WBC 2.9 (L) 4.8 - 10.8 K/uL    RBC 4.38 4.20 - 5.40 M/uL    Hemoglobin 13.6 12.0 - 16.0 g/dL    Hematocrit 39.2 37.0 - 47.0 %    MCV 89.5 81.4 - 97.8 fL    MCH 31.1 27.0 - 33.0 pg    MCHC 34.7 33.6 - 35.0 g/dL    RDW 44.4 35.9 - 50.0 fL    Platelet Count 198 164 - 446 K/uL    MPV 10.4 9.0 - 12.9 fL    Neutrophils-Polys 40.80 (L) 44.00 - 72.00 %    Lymphocytes 43.90 (H) 22.00 - 41.00 %    Monocytes 11.10 0.00 - 13.40 %    Eosinophils 4.20 0.00 - 6.90 %    Basophils 0.00 0.00 - 1.80 %    Immature Granulocytes 0.00 0.00 - 0.90 %    Nucleated RBC 0.00 /100 WBC    Neutrophils (Absolute) 1.18 (L) 2.00 - 7.15 K/uL    Lymphs (Absolute) 1.27 1.00 - 4.80 K/uL    Monos (Absolute) 0.32 0.00 - 0.85 K/uL    Eos (Absolute) 0.12 0.00 - 0.51 K/uL    Baso (Absolute) 0.00 0.00 - 0.12 K/uL    Immature Granulocytes (abs) 0.00 0.00 - 0.11 K/uL    NRBC (Absolute) 0.00 K/uL   Basic Metabolic Panel (BMP)    Collection Time: 03/09/21  6:15 AM    Result Value Ref Range    Sodium 141 135 - 145 mmol/L    Potassium 4.3 3.6 - 5.5 mmol/L    Chloride 103 96 - 112 mmol/L    Co2 28 20 - 33 mmol/L    Glucose 96 65 - 99 mg/dL    Bun 14 8 - 22 mg/dL    Creatinine 0.65 0.50 - 1.40 mg/dL    Calcium 9.4 8.5 - 10.5 mg/dL    Anion Gap 10.0 7.0 - 16.0   ESTIMATED GFR    Collection Time: 03/09/21  6:15 AM   Result Value Ref Range    GFR If African American >60 >60 mL/min/1.73 m 2    GFR If Non African American >60 >60 mL/min/1.73 m 2       IMAGING:   Images Independently Reviewed   No orders to display       ASSESSMENT/PLAN     65-year-old female with chronic complaints of hemorrhoids that wax and wane and are associated with occasional bright red blood per rectum.  No evidence of rectal prolapse.  She is currently having worsened pain associated with her hemorrhoids as well as 2 swollen external hemorrhoids and associated thrombosis of one of the hemorrhoids.  This pain has been chronic in nature and has been present for greater than 72 hours.    There is no need for emergent surgery with regards to her hemorrhoids.  At this time I would recommend symptomatic management with analgesia, topical steroids for 7 days, ice and continuation of high-fiber diet with MiraLAX and Colace.    I would like to see the patient in clinic to perform a more thorough exam, including anoscopy and to discuss possible elective hemorrhoidectomy.    With regards to her chronic gastrointestinal complaints, I would recommend that she follow-up with her gastroenterologist.  She is also due for repeat colonoscopy due to recent findings associated with her polyps, according to the patient.    Management of headache as per primary team.    Estela Kenney MD  General Surgery  Colon and Rectal Surgery  [facility]

## 2021-03-09 NOTE — H&P
Hospital Medicine History & Physical Note    Date of Service  3/9/2021    Primary Care Physician  Marija Baker M.D.    Consultants  Surgery - Dr. Mensah    Code Status  Full Code    Chief Complaint  Chief Complaint   Patient presents with   • Rectal Pain     x 4-5 days, bleeding, unable to sit down comfortably. hx hemorrhoids       History of Presenting Illness  65 y.o. female with past medical history of asthma, COPD, who presented 3/8/2021 with progressive rectal pain for the last 1 week causing her to not eat. She reports losing approximately 40 pounds and has had nausea and vomiting. Exacerbating factors include increase intraabdominal pressure. She also reports headaches for the past week and blames it on her hemorrhoids. She denies taking any medication for her elevated blood pressure. She reports having history of hemorrhoids s/p hemorridectomy when she was in her twenties. Patient denies any recent anal sex or manipulation. General surgery was consulted who recommended placing sugar on prolapse and they shall see the patient. Hospital medicine called for admission and orders.      Review of Systems  Review of Systems   Gastrointestinal: Positive for nausea and vomiting.        Rectal pain    Neurological: Positive for headaches.   All other systems reviewed and are negative.      Past Medical History   has a past medical history of ASTHMA, Chest discomfort (04/2019), COPD, Hyperlipidemia, Hypertension, OSTEOPOROSIS, and Shortness of breath (05/2019).    Surgical History   has a past surgical history that includes abdominal hysterectomy total and appendectomy.     Family History  family history includes Cancer in her father; Heart Disease in her brother; Heart Disease (age of onset: 65) in her mother.     Social History   reports that she has quit smoking. She smoked 0.00 packs per day. She has never used smokeless tobacco. She reports previous alcohol use of about 2.4 oz of alcohol per week. She reports  that she does not use drugs.    Allergies  Allergies   Allergen Reactions   • Pcn [Penicillins]    • Sulfa Drugs Rash     7/15/19 welting urticaria/rash after taking Bactrim        Medications  Prior to Admission Medications   Prescriptions Last Dose Informant Patient Reported? Taking?   Turmeric Powder   Yes No   Sig: by Does not apply route.   glycopyrrolate (ROBINUL) 2 MG tablet   Yes No   hyoscyamine (LEVSIN) 0.125 MG SL Tab   Yes No   Sig: DISSOLVE 1 2 TABLETS UNDER THE TONGUE EVERY 6 HOURS AS NEEDED FOR PAIN (NOT COVERED)   metroNIDAZOLE (FLAGYL) 250 MG Tab   Yes No   neomycin (MYCIFRADIN) 500 MG Tab   Yes No   raloxifene (EVISTA) 60 MG Tab   No No   Sig: TAKE 1 TABLET BY MOUTH EVERY DAY   sertraline (ZOLOFT) 50 MG Tab   No No   Sig: TAKE 1 TABLET BY MOUTH EVERY DAY   vitamin D (CHOLECALCIFEROL) 1000 UNIT Tab   Yes No   Sig: Take 1,000 Units by mouth every day.      Facility-Administered Medications: None       Physical Exam  Temp:  [36 °C (96.8 °F)-36.7 °C (98 °F)] 36.7 °C (98 °F)  Pulse:  [62-90] 90  Resp:  [15-16] 15  BP: (137-181)/() 157/92  SpO2:  [97 %-99 %] 97 %    Physical Exam  Vitals and nursing note reviewed. Exam conducted with a chaperone present (LORIN Kraus).   Constitutional:       Comments: Laying on her stomach secondary to pain    HENT:      Head: Normocephalic and atraumatic.      Mouth/Throat:      Pharynx: Oropharynx is clear.   Eyes:      Extraocular Movements: Extraocular movements intact.      Pupils: Pupils are equal, round, and reactive to light.   Cardiovascular:      Rate and Rhythm: Normal rate and regular rhythm.      Pulses: Normal pulses.      Heart sounds: Normal heart sounds.   Pulmonary:      Effort: Pulmonary effort is normal.      Breath sounds: Normal breath sounds.   Abdominal:      General: Bowel sounds are normal.      Palpations: Abdomen is soft.   Genitourinary:     Comments: Examined with Chaperone LORIN Kraus   Small part of her rectum that is prolapsed with  mild bleeding. Sugar present     Musculoskeletal:         General: No swelling or tenderness. Normal range of motion.      Cervical back: Neck supple.   Skin:     General: Skin is warm and dry.      Capillary Refill: Capillary refill takes less than 2 seconds.      Coloration: Skin is not jaundiced or pale.   Neurological:      General: No focal deficit present.      Mental Status: She is alert and oriented to person, place, and time.   Psychiatric:         Mood and Affect: Mood normal.         Behavior: Behavior normal.         Laboratory:  Recent Labs     03/08/21 2106   WBC 4.5*   RBC 4.27   HEMOGLOBIN 13.1   HEMATOCRIT 37.9   MCV 88.8   MCH 30.7   MCHC 34.6   RDW 44.3   PLATELETCT 217   MPV 10.4     Recent Labs     03/08/21 2106   SODIUM 139   POTASSIUM 3.8   CHLORIDE 103   CO2 25   GLUCOSE 98   BUN 16   CREATININE 0.66   CALCIUM 10.4     Recent Labs     03/08/21 2106   ALTSGPT 26   ASTSGOT 27   ALKPHOSPHAT 50   TBILIRUBIN 0.6   GLUCOSE 98         No results for input(s): NTPROBNP in the last 72 hours.      No results for input(s): TROPONINT in the last 72 hours.    Imaging:  No orders to display         Assessment/Plan:  I anticipate this patient is appropriate for observation status at this time.    * Rectal pain  Assessment & Plan  Secondary to hemorrhoids vs rectal prolapse.   General surgery - Dr. Mensah - consulted who recommended placing sugar on prolaps to see if retraction occurs. Appreciate further recommendations  Pain control       Rectal prolapse vs prolapsed hemorroids   Assessment & Plan  See plan above.     Essential hypertension- (present on admission)  Assessment & Plan  Patient reports not taking any medications. Start on Norvasc 2.5 mg and titrate.   Labetalol 10 mg prn hold for SBP<180, DBP<105, HR<60    VTE: lovenox

## 2021-03-09 NOTE — ASSESSMENT & PLAN NOTE
Patient reports not taking any medications. Start on Norvasc 2.5 mg and titrate.   Labetalol 10 mg prn hold for SBP<180, DBP<105, HR<60

## 2021-03-09 NOTE — PROGRESS NOTES
Alvarado catheter removed, pt tolerated well.   Pt consumed 75% of regular breakfast meal.  Abdomen distended, belching constantly, vomited 20 mL, declines medication at this time. NP notified

## 2021-03-09 NOTE — DISCHARGE INSTRUCTIONS
Discharge Instructions    Discharged to home by car with relative. Discharged via wheelchair, hospital escort: Yes.  Special equipment needed: Not Applicable    Be sure to schedule a follow-up appointment with your primary care doctor or any specialists as instructed.     Discharge Plan:   Diet Plan: Discussed  Activity Level: Discussed  Confirmed Follow up Appointment: Patient to Call and Schedule Appointment  Confirmed Symptoms Management: Discussed  Medication Reconciliation Updated: Yes  Influenza Vaccine Indication: Patient Refuses    I understand that a diet low in cholesterol, fat, and sodium is recommended for good health. Unless I have been given specific instructions below for another diet, I accept this instruction as my diet prescription.   Other diet: Heart healthy     Special Instructions: None    · Is patient discharged on Warfarin / Coumadin?   No     Depression / Suicide Risk    As you are discharged from this [facility] facility, it is important to learn how to keep safe from harming yourself.    Recognize the warning signs:  · Abrupt changes in personality, positive or negative- including increase in energy   · Giving away possessions  · Change in eating patterns- significant weight changes-  positive or negative  · Change in sleeping patterns- unable to sleep or sleeping all the time   · Unwillingness or inability to communicate  · Depression  · Unusual sadness, discouragement and loneliness  · Talk of wanting to die  · Neglect of personal appearance   · Rebelliousness- reckless behavior  · Withdrawal from people/activities they love  · Confusion- inability to concentrate     If you or a loved one observes any of these behaviors or has concerns about self-harm, here's what you can do:  · Talk about it- your feelings and reasons for harming yourself  · Remove any means that you might use to hurt yourself (examples: pills, rope, extension cords, firearm)  · Get professional help from the  community (Mental Health, Substance Abuse, psychological counseling)  · Do not be alone:Call your Safe Contact- someone whom you trust who will be there for you.  · Call your local CRISIS HOTLINE 582-9218 or 423-359-8934  · Call your local Children's Mobile Crisis Response Team [place] (104) 881-0909 or www.[employer]  · Call the toll free National Suicide Prevention Hotlines   · National Suicide Prevention Lifeline 759-632-DOFY (0151)  · National Hope Line Network 800-SUICIDE (714-1738)

## 2021-03-09 NOTE — ASSESSMENT & PLAN NOTE
Secondary to hemorrhoids vs rectal prolapse.   General surgery - Dr. Mensah - consulted who recommended placing sugar on prolaps to see if retraction occurs. Appreciate further recommendations  Pain control

## 2021-03-09 NOTE — ED PROVIDER NOTES
ED Provider Note    CHIEF COMPLAINT  Chief Complaint   Patient presents with   • Rectal Pain     x 4-5 days, bleeding, unable to sit down comfortably. hx hemorrhoids       HPI  Funmilayo Egan is a 65 y.o. female who presents with rectal pain.  The patient states she has a history of hemorrhoids and did have a hemorrhoidectomy at an early age.  She states over the last month her rectal pain has been severe.  She is lost 40 pounds that she cannot eat because she has severe pain with defecation.  She is also had periodic nausea and vomiting.  She does have some blood in her stool.  She states the pain is worse with attempted stooling.  She does not have any difficulty with urination.    REVIEW OF SYSTEMS  See HPI for further details. All other systems are negative.     PAST MEDICAL HISTORY  Past Medical History:   Diagnosis Date   • Shortness of breath 05/2019    Echocardiogram with normal LV size, LVEF 65%.Trace MR. Mild TR. RVSP 30mmHg.   • Chest discomfort 04/2019    Coronary CT scan with LAD 11.4.    • ASTHMA     remote hx   • COPD     mild, not on meds for it   • Hyperlipidemia    • Hypertension    • OSTEOPOROSIS        FAMILY HISTORY  [unfilled]    SOCIAL HISTORY  Social History     Socioeconomic History   • Marital status:      Spouse name: Not on file   • Number of children: Not on file   • Years of education: Not on file   • Highest education level: Not on file   Occupational History   • Not on file   Tobacco Use   • Smoking status: Former Smoker     Packs/day: 0.00   • Smokeless tobacco: Never Used   • Tobacco comment: 20 y.a   Substance and Sexual Activity   • Alcohol use: Not Currently     Alcohol/week: 2.4 oz     Types: 4 Glasses of wine per week   • Drug use: No   • Sexual activity: Never   Other Topics Concern   • Not on file   Social History Narrative   • Not on file     Social Determinants of Health     Financial Resource Strain:    • Difficulty of Paying Living Expenses:    Food Insecurity:    •  "Worried About Running Out of Food in the Last Year:    • Ran Out of Food in the Last Year:    Transportation Needs:    • Lack of Transportation (Medical):    • Lack of Transportation (Non-Medical):    Physical Activity:    • Days of Exercise per Week:    • Minutes of Exercise per Session:    Stress:    • Feeling of Stress :    Social Connections:    • Frequency of Communication with Friends and Family:    • Frequency of Social Gatherings with Friends and Family:    • Attends Hindu Services:    • Active Member of Clubs or Organizations:    • Attends Club or Organization Meetings:    • Marital Status:    Intimate Partner Violence:    • Fear of Current or Ex-Partner:    • Emotionally Abused:    • Physically Abused:    • Sexually Abused:        SURGICAL HISTORY  Past Surgical History:   Procedure Laterality Date   • ABDOMINAL HYSTERECTOMY TOTAL     • APPENDECTOMY         CURRENT MEDICATIONS  Home Medications    **Home medications have not yet been reviewed for this encounter**         ALLERGIES  Allergies   Allergen Reactions   • Pcn [Penicillins]    • Sulfa Drugs Rash     7/15/19 welting urticaria/rash after taking Bactrim        PHYSICAL EXAM  VITAL SIGNS: /81   Pulse 72   Temp 36 °C (96.8 °F) (Oral)   Resp 16   Ht 1.575 m (5' 2\")   Wt 49.4 kg (109 lb)   SpO2 98%   BMI 19.94 kg/m²       Constitutional: Cachectic, mild acute distress, Non-toxic appearance.   HENT: Normocephalic, Atraumatic, Bilateral external ears normal, Oropharynx moist, No oral exudates, Nose normal.   Eyes: PERRLA, EOMI, Conjunctiva normal, No discharge.   Neck: Normal range of motion, No tenderness, Supple, No stridor.   Lymphatic: No lymphadenopathy noted.   Cardiovascular: Normal heart rate, Normal rhythm, No murmurs, No rubs, No gallops.   Thorax & Lungs: Normal breath sounds, No respiratory distress, No wheezing, No chest tenderness.   Abdomen: Bowel sounds normal, Soft, No tenderness, No masses, No pulsatile masses.   Skin: " Warm, Dry, No erythema, No rash.   Back: No tenderness, No CVA tenderness.   Rectal: An erythematous area that appears to be a small part of her rectum that is prolapsed with mild bleeding.   Extremities: Intact distal pulses, No edema, No tenderness, No cyanosis, No clubbing.   Neurologic: Alert & oriented x 3, Normal motor function, Normal sensory function, No focal deficits noted.   Psychiatric: Affect normal, Judgment normal, Mood normal.     COURSE & MEDICAL DECISION MAKING  Pertinent Labs & Imaging studies reviewed. (See chart for details)  This a 65-year-old female who presents with rectal pain.  My exam is more consistent with a small rectal prolapse.  The patient's had significant weight loss and not want to eat as she has significant pain with stooling.  She did receive some fentanyl for relief.  In speaking with the general surgeon he recommended placing sugar on the prolapse to see if it will retract.  Therefore this will be performed by nursing staff.  Will admit the patient to the hospitalist with general surgery in consultation.  Laboratory analysis is reassuring.  She does not appear septic.    FINAL IMPRESSION  1.  Rectal pain  2.  Suspect rectal prolapse versus hemorrhoid    Disposition  The patient will be admitted in stable condition         Electronically signed by: Betito Mcfarlane M.D., 3/8/2021 8:31 PM

## 2021-03-09 NOTE — ED TRIAGE NOTES
"Chief Complaint   Patient presents with   • Rectal Pain     x 4-5 days, bleeding, unable to sit down comfortably. hx hemorrhoids     Pt amb to triage with slow but steady gait for above complaint. Tried multiple OTC cream/gels with no relief.   Pt is alert and oriented, speaking in full sentences, follows commands and responds appropriately to questions. Resp are even and unlabored. No behavioral indicators of pain.   Pt placed in lobby. Pt educated on triage process. Pt encouraged to alert staff for any changes.    BP (!) 170/105   Pulse 90   Temp 36 °C (96.8 °F) (Oral)   Resp 16   Ht 1.575 m (5' 2\")   Wt 49.4 kg (109 lb)   SpO2 98%   BMI 19.94 kg/m²     "

## 2021-03-09 NOTE — HOSPITAL COURSE
Ms. Egan is a 65-year-old female with a past medical history of hemorrhoids who presented to the emergency department on 3/8/2021 with complaints of rectal pain x4-5 days.  She reports losing approximately 40 pounds due to nausea, vomiting, and bloating over the last 2 months, though upon further examination she states she has not eaten since February 2020.  She was found to have what appeared to be a prolapsed rectum so general surgery was consulted and recommended placing sugar on the prolapse to see if that would cause retraction.  That was largely successful and was nearly successful upon evaluation by general surgery who did not believe it to be a rectal prolapse but instead diagnosed as hemorrhoids.  They recommended no surgical intervention and instead placed her on topical steroids x7 days, ice packs to the affected area, continuation of a high-fiber diet, and scheduled MiraLAX and colace.  She does follow [facility] and has an appointment with them for colonoscopy on Friday which we have encouraged she keep.  Of additional note, she does have leukopenia which according to her is not new but has not been worked up in the past.  This can be worked up in the outpatient setting through her PCP.  On the day of discharge her vital signs are stable, she is independently ambulatory around the unit, and she is medically clear for discharge home today with the medication changes as listed below.

## 2021-03-10 ENCOUNTER — PATIENT OUTREACH (OUTPATIENT)
Dept: HEALTH INFORMATION MANAGEMENT | Facility: OTHER | Age: 66
End: 2021-03-10

## 2021-03-10 ENCOUNTER — TELEPHONE (OUTPATIENT)
Dept: HEALTH INFORMATION MANAGEMENT | Facility: OTHER | Age: 66
End: 2021-03-10

## 2021-03-10 NOTE — TELEPHONE ENCOUNTER
[facility] LSW placed outreach call to mbr to follow up on hospital discharge. Mbr reported she is still in a lot of pain. [facility] At this time she is not interested in setting up a PCP hospital follow up appt or having [facility] provider come out to the home. She reported she would call LSW back if needed or if she changes her mind.

## 2021-04-01 ENCOUNTER — HOSPITAL ENCOUNTER (OUTPATIENT)
Dept: RADIOLOGY | Facility: MEDICAL CENTER | Age: 66
End: 2021-04-01
Attending: FAMILY MEDICINE
Payer: MEDICARE

## 2021-04-01 DIAGNOSIS — Z12.31 ENCOUNTER FOR SCREENING MAMMOGRAM FOR MALIGNANT NEOPLASM OF BREAST: ICD-10-CM

## 2021-04-01 DIAGNOSIS — M81.0 AGE-RELATED OSTEOPOROSIS WITHOUT CURRENT PATHOLOGICAL FRACTURE: ICD-10-CM

## 2021-04-01 PROCEDURE — 77063 BREAST TOMOSYNTHESIS BI: CPT

## 2021-04-01 PROCEDURE — 77080 DXA BONE DENSITY AXIAL: CPT

## 2021-04-05 ENCOUNTER — NURSE TRIAGE (OUTPATIENT)
Dept: HEALTH INFORMATION MANAGEMENT | Facility: OTHER | Age: 66
End: 2021-04-05

## 2021-04-05 ENCOUNTER — OFFICE VISIT (OUTPATIENT)
Dept: URGENT CARE | Facility: CLINIC | Age: 66
End: 2021-04-05
Payer: MEDICARE

## 2021-04-05 VITALS
BODY MASS INDEX: 20.8 KG/M2 | SYSTOLIC BLOOD PRESSURE: 136 MMHG | HEIGHT: 62 IN | RESPIRATION RATE: 16 BRPM | DIASTOLIC BLOOD PRESSURE: 70 MMHG | HEART RATE: 90 BPM | OXYGEN SATURATION: 98 % | WEIGHT: 113 LBS | TEMPERATURE: 97.5 F

## 2021-04-05 DIAGNOSIS — M76.60 ACHILLES TENDON PAIN: ICD-10-CM

## 2021-04-05 DIAGNOSIS — M67.90 TENDINOPATHY: ICD-10-CM

## 2021-04-05 PROCEDURE — 99214 OFFICE O/P EST MOD 30 MIN: CPT | Performed by: NURSE PRACTITIONER

## 2021-04-05 ASSESSMENT — PAIN SCALES - GENERAL: PAINLEVEL: 3=SLIGHT PAIN

## 2021-04-05 ASSESSMENT — FIBROSIS 4 INDEX: FIB4 SCORE: 1.74

## 2021-04-05 NOTE — TELEPHONE ENCOUNTER
Received 2nd vaccine on 4/1, Pfizer, achilles tendon hurting badly startEd 4/3, really sore, on left side, received vaccine on leftarm, can barely walk, pretty painful, cannot walk on left leg, right behind ankle is warm & red, today center of tendon is a little red.  She cannot walk on leg, it is very painful.  Had tendon problem in both feet 6 years ago.

## 2021-04-06 ASSESSMENT — ENCOUNTER SYMPTOMS
HEADACHES: 0
CHILLS: 0
NAUSEA: 0
FEVER: 0
SHORTNESS OF BREATH: 0
SINUS PAIN: 0
MYALGIAS: 0
COUGH: 0

## 2021-04-06 ASSESSMENT — LIFESTYLE VARIABLES: SUBSTANCE_ABUSE: 0

## 2021-04-06 NOTE — PROGRESS NOTES
Funmilayo Egan is a 65 y.o. female who presents for Foot Pain (left foot/heel pain following 2nd covid vaccine 4-1-2021)      HPI this new problem.  Es is a 65-year-old female presents with left foot and heel pain.  Primarily in the area behind her heel.  She received her Second Covid vaccination on April 1, 2021 and is concerned that her symptoms started shortly after receiving her Covid vaccination.  She says after receiving the Covid vaccination she felt to achy and chilled with a headache.  That lasted approximately 24 hours.  She then recovered but started having pain in her heel.  She denies unusual shoes, unusual activity or trauma.  Treatments tried: Over-the-counter muscle ache rubs.  They seem to help.  She is using several of them.  She says she just keeps rubbing her heel all the time.  She has tried range of motion exercises and is able to move her foot and ankle without any difficulty.  The pain is so bad it is hard for her to walk at times.  It feels better when she keeps her foot straight.  No other aggravating or alleviating factors. No recent antibiotic use. No recent illness.     Review of Systems   Constitutional: Negative for chills, fever and malaise/fatigue.   HENT: Negative for congestion and sinus pain.    Respiratory: Negative for cough and shortness of breath.    Cardiovascular: Negative for chest pain.   Gastrointestinal: Negative for nausea.   Musculoskeletal: Negative for myalgias.   Neurological: Negative for headaches.   Psychiatric/Behavioral: Negative for substance abuse.       Allergies   Allergen Reactions   • Pcn [Penicillins]    • Sulfa Drugs Rash     7/15/19 welting urticaria/rash after taking Bactrim      Past Medical History:   Diagnosis Date   • ASTHMA     remote hx   • Chest discomfort 04/2019    Coronary CT scan with LAD 11.4.    • COPD     mild, not on meds for it   • Hyperlipidemia    • Hypertension    • OSTEOPOROSIS    • Shortness of breath 05/2019    Echocardiogram  with normal LV size, LVEF 65%.Trace MR. Mild TR. RVSP 30mmHg.     Past Surgical History:   Procedure Laterality Date   • ABDOMINAL HYSTERECTOMY TOTAL     • APPENDECTOMY       Family History   Problem Relation Age of Onset   • Heart Disease Mother 65   • Cancer Father    • Heart Disease Brother      Social History     Tobacco Use   • Smoking status: Former Smoker     Packs/day: 0.00   • Smokeless tobacco: Never Used   • Tobacco comment: 20 y.a   Substance Use Topics   • Alcohol use: Not Currently     Alcohol/week: 2.4 oz     Types: 4 Glasses of wine per week     Patient Active Problem List   Diagnosis   • Anxiety   • Hair pulling   • Essential hypertension   • Age-related osteoporosis without current pathological fracture   • Other fatigue   • Chest discomfort   • Dyslipidemia   • Shortness of breath   • Urge incontinence of urine   • Right sided abdominal pain   • Sebaceous cyst   • Generalized abdominal pain   • Rectal pain   • Rectal prolapse vs prolapsed hemorroids      Current Outpatient Medications on File Prior to Visit   Medication Sig Dispense Refill   • amLODIPine (NORVASC) 2.5 MG Tab Take 1 tablet by mouth every day. 30 tablet 1   • docusate sodium 100 MG Cap Take 100 mg by mouth 2 Times a Day. 60 capsule 1   • polyethylene glycol/lytes (MIRALAX) 17 g Pack Take 1 Packet by mouth every day. 30 Each 1   • psyllium (METAMUCIL) 58.6 % Pack Take 1 Packet by mouth every day. 54 Each 1   • glycopyrrolate (ROBINUL) 2 MG tablet      • hyoscyamine (LEVSIN) 0.125 MG SL Tab DISSOLVE 1 2 TABLETS UNDER THE TONGUE EVERY 6 HOURS AS NEEDED FOR PAIN (NOT COVERED)     • sertraline (ZOLOFT) 50 MG Tab TAKE 1 TABLET BY MOUTH EVERY DAY 90 Tab 3   • raloxifene (EVISTA) 60 MG Tab TAKE 1 TABLET BY MOUTH EVERY DAY 90 Tab 3   • Turmeric Powder by Does not apply route.     • vitamin D (CHOLECALCIFEROL) 1000 UNIT Tab Take 1,000 Units by mouth every day.     • metroNIDAZOLE (FLAGYL) 250 MG Tab      • neomycin (MYCIFRADIN) 500 MG Tab     "    No current facility-administered medications on file prior to visit.          Objective:     /70 (BP Location: Right arm, Patient Position: Sitting)   Pulse 90   Temp 36.4 °C (97.5 °F) (Tympanic)   Resp 16   Ht 1.575 m (5' 2\")   Wt 51.3 kg (113 lb)   SpO2 98%   BMI 20.67 kg/m²     Physical Exam  Vitals and nursing note reviewed.   Constitutional:       General: She is not in acute distress.     Appearance: Normal appearance. She is normal weight. She is not ill-appearing.   Cardiovascular:      Rate and Rhythm: Normal rate.      Pulses: Normal pulses.      Heart sounds: Normal heart sounds.   Pulmonary:      Effort: Pulmonary effort is normal.      Breath sounds: Normal breath sounds.   Musculoskeletal:      Left ankle: Swelling (Posterior calcaneal area has mild edema, + TTP) present. No deformity, ecchymosis or lacerations. Tenderness (Tenderness on the plantar aspect of her heel as well as Achilles tendon) present. Normal range of motion.      Left Achilles Tendon: Tenderness present. No defects. Oliver's test negative.        Legs:       Comments: She is in constant motion moving her left foot and rubbing it during the entire exam.  She continually is moving her foot in circles and up and down while rubbing her feet and heels.  She reports that she is doing this to try to help her feel better.   Skin:     General: Skin is warm and dry.      Capillary Refill: Capillary refill takes less than 2 seconds.   Neurological:      Mental Status: She is alert.   Psychiatric:         Mood and Affect: Mood normal.         Behavior: Behavior normal.         Thought Content: Thought content normal.         Assessment /Associated Orders:      1. Tendinopathy  diclofenac sodium 1 % Gel   2. Achilles tendon pain  diclofenac sodium 1 % Gel       Medical Decision Making:    Pt is clinically stable at today's acute urgent care visit.  No acute distress noted. Appropriate for outpatient management at this time. "   I am placing her in a short cam boot to isolate her tendon and allow her to rest.  I advised her to do gentle range of motion's only 2-3 times a day.  I believe she is possibly over stretching and rubbing on her tendon, ankle and foot aggravating her symptoms.  I have asked that she wear the cam boot as much as she can with a goal of 23 hours a day.  I have made a referral for her to go to sports medicine for follow-up.  There is no Achilles tendon defect on physical exam today nor she had any aggravating activity or trauma.  She has no calf tenderness and she is able to bear full weight.  I believe it is reasonable to do conservative therapy and then wait for sports therapy evaluation and management if symptoms do not improve.  Ice/ heat packs prn discomfort  OTC  analgesic of choice (acetaminophen or NSAID). Follow manufactures dosing and safety precautions.   Educated in proper administration of medication(s) ordered today including safety, possible SE, risks, benefits, rationale and alternatives to therapy.         Advised the patient to follow-up with the primary care provider for recheck, reevaluation, and consideration of further management if necessary.   Discussed management options (risks,benefits, and alternatives to treatment). Pt expresses understanding and the treatment plan was agreed upon. Questions were encouraged and answered to pt's satisfaction.       Return to urgent care prn if new or worsening sx or if there is no improvement in condition prn . Red flags discussed and indications to immediately call 911 or present to the Emergency Department.     I personally reviewed prior external notes and test results pertinent to today's visit.  I have independently reviewed and interpreted all diagnostics ordered during this urgent care acute visit.   Time spent evaluating this patient was at least 30 minutes and includes preparing for visit, counseling/education, exam and evaluation, obtaining  history, independent interpretation, ordering lab/test/procedures and medication management.

## 2021-04-09 ENCOUNTER — PATIENT MESSAGE (OUTPATIENT)
Dept: HEALTH INFORMATION MANAGEMENT | Facility: OTHER | Age: 66
End: 2021-04-09

## 2021-04-12 ENCOUNTER — TELEMEDICINE (OUTPATIENT)
Dept: MEDICAL GROUP | Facility: MEDICAL CENTER | Age: 66
End: 2021-04-12
Payer: MEDICARE

## 2021-04-12 VITALS
DIASTOLIC BLOOD PRESSURE: 70 MMHG | BODY MASS INDEX: 20.8 KG/M2 | HEIGHT: 62 IN | SYSTOLIC BLOOD PRESSURE: 130 MMHG | TEMPERATURE: 98.6 F | WEIGHT: 113 LBS

## 2021-04-12 DIAGNOSIS — I10 ESSENTIAL HYPERTENSION: ICD-10-CM

## 2021-04-12 PROCEDURE — 99999 PR NO CHARGE: CPT | Mod: 95,CR | Performed by: FAMILY MEDICINE

## 2021-04-12 RX ORDER — AMLODIPINE BESYLATE 2.5 MG/1
2.5 TABLET ORAL DAILY
Qty: 30 TABLET | Refills: 1 | Status: SHIPPED | OUTPATIENT
Start: 2021-04-12 | End: 2021-06-01

## 2021-04-12 ASSESSMENT — FIBROSIS 4 INDEX: FIB4 SCORE: 1.74

## 2021-04-13 ENCOUNTER — OFFICE VISIT (OUTPATIENT)
Dept: MEDICAL GROUP | Facility: CLINIC | Age: 66
End: 2021-04-13
Payer: MEDICARE

## 2021-04-13 ENCOUNTER — APPOINTMENT (OUTPATIENT)
Dept: RADIOLOGY | Facility: IMAGING CENTER | Age: 66
End: 2021-04-13
Attending: FAMILY MEDICINE
Payer: MEDICARE

## 2021-04-13 ENCOUNTER — PRE-ADMISSION TESTING (OUTPATIENT)
Dept: ADMISSIONS | Facility: MEDICAL CENTER | Age: 66
End: 2021-04-13
Attending: SURGERY
Payer: MEDICARE

## 2021-04-13 VITALS
OXYGEN SATURATION: 96 % | SYSTOLIC BLOOD PRESSURE: 118 MMHG | DIASTOLIC BLOOD PRESSURE: 78 MMHG | RESPIRATION RATE: 18 BRPM | TEMPERATURE: 98.5 F | BODY MASS INDEX: 20.8 KG/M2 | WEIGHT: 113 LBS | HEART RATE: 95 BPM | HEIGHT: 62 IN

## 2021-04-13 DIAGNOSIS — Z01.810 PRE-OPERATIVE CARDIOVASCULAR EXAMINATION: ICD-10-CM

## 2021-04-13 DIAGNOSIS — M25.572 ACUTE LEFT ANKLE PAIN: ICD-10-CM

## 2021-04-13 DIAGNOSIS — M25.872 DECREASED PROPRIOCEPTION OF JOINT OF FOOT, LEFT: ICD-10-CM

## 2021-04-13 DIAGNOSIS — Z01.812 PRE-OPERATIVE LABORATORY EXAMINATION: ICD-10-CM

## 2021-04-13 DIAGNOSIS — Z01.811 PRE-OPERATIVE RESPIRATORY EXAMINATION: ICD-10-CM

## 2021-04-13 LAB
ANION GAP SERPL CALC-SCNC: 7 MMOL/L (ref 7–16)
BUN SERPL-MCNC: 14 MG/DL (ref 8–22)
CALCIUM SERPL-MCNC: 10.3 MG/DL (ref 8.5–10.5)
CHLORIDE SERPL-SCNC: 103 MMOL/L (ref 96–112)
CO2 SERPL-SCNC: 30 MMOL/L (ref 20–33)
CREAT SERPL-MCNC: 0.67 MG/DL (ref 0.5–1.4)
EKG IMPRESSION: NORMAL
GLUCOSE SERPL-MCNC: 90 MG/DL (ref 65–99)
POTASSIUM SERPL-SCNC: 4.5 MMOL/L (ref 3.6–5.5)
SODIUM SERPL-SCNC: 140 MMOL/L (ref 135–145)

## 2021-04-13 PROCEDURE — 36415 COLL VENOUS BLD VENIPUNCTURE: CPT

## 2021-04-13 PROCEDURE — U0005 INFEC AGEN DETEC AMPLI PROBE: HCPCS

## 2021-04-13 PROCEDURE — 93010 ELECTROCARDIOGRAM REPORT: CPT | Performed by: INTERNAL MEDICINE

## 2021-04-13 PROCEDURE — C9803 HOPD COVID-19 SPEC COLLECT: HCPCS

## 2021-04-13 PROCEDURE — 93005 ELECTROCARDIOGRAM TRACING: CPT

## 2021-04-13 PROCEDURE — U0003 INFECTIOUS AGENT DETECTION BY NUCLEIC ACID (DNA OR RNA); SEVERE ACUTE RESPIRATORY SYNDROME CORONAVIRUS 2 (SARS-COV-2) (CORONAVIRUS DISEASE [COVID-19]), AMPLIFIED PROBE TECHNIQUE, MAKING USE OF HIGH THROUGHPUT TECHNOLOGIES AS DESCRIBED BY CMS-2020-01-R: HCPCS

## 2021-04-13 PROCEDURE — 73590 X-RAY EXAM OF LOWER LEG: CPT | Mod: TC,LT | Performed by: FAMILY MEDICINE

## 2021-04-13 PROCEDURE — 80048 BASIC METABOLIC PNL TOTAL CA: CPT

## 2021-04-13 PROCEDURE — 99204 OFFICE O/P NEW MOD 45 MIN: CPT | Performed by: FAMILY MEDICINE

## 2021-04-13 ASSESSMENT — ENCOUNTER SYMPTOMS
NAUSEA: 0
HEADACHES: 1
VOMITING: 0
CHILLS: 0
FEVER: 0
DIZZINESS: 0
SHORTNESS OF BREATH: 0

## 2021-04-13 ASSESSMENT — FIBROSIS 4 INDEX
FIB4 SCORE: 1.74
FIB4 SCORE: 1.74

## 2021-04-13 NOTE — PROGRESS NOTES
Unfortunately, we are unable to get connected via Zoom today.  Patient is having technical difficulties.  We rescheduled for next week.

## 2021-04-13 NOTE — PROGRESS NOTES
Subjective:      Funmilayo Egan is a 65 y.o. female who presents with Ankle Pain (Referral from UC/ L achilles pain )     Referred by SLOANE Kee  for evaluation of LEFT heel pain    HPI   LEFT heel/ankle pain  Date of onset, Saturday, April 3, 2021  She has been walking/shopping  Is predominantly along the LEFT Achilles tendon with some radiation of pain into the medial aspect of the ankle around the medial malleolus  She does have some discomfort also at the calf region  Pain is achy  Constant  Symptoms have improved with short cam walker boot and a medication that she had leftover from several years ago  Worse with ambulation and weightbearing  Topical diclofenac gel has also helped  She denies any recent or remote injury  POSITIVE night symptoms  Improved in the mornings  She did have ciprofloxacin for a GI issue back in July 2020    She owns a cleaning business  Walking    Review of Systems   Constitutional: Negative for chills and fever.   Respiratory: Negative for shortness of breath.    Cardiovascular: Negative for chest pain.   Gastrointestinal: Negative for nausea and vomiting.   Neurological: Positive for headaches. Negative for dizziness.   Which she attributes to side effect from one of her medicines    PMH:  has a past medical history of ASTHMA, Chest discomfort (04/2019), COPD, Hyperlipidemia, Hypertension, OSTEOPOROSIS, and Shortness of breath (05/2019).  MEDS:   Current Outpatient Medications:   •  amLODIPine (NORVASC) 2.5 MG Tab, Take 1 tablet by mouth every day., Disp: 30 tablet, Rfl: 1  •  diclofenac sodium 1 % Gel, Apply topically BID to ankle, Disp: 150 g, Rfl: 0  •  docusate sodium 100 MG Cap, Take 100 mg by mouth 2 Times a Day., Disp: 60 capsule, Rfl: 1  •  polyethylene glycol/lytes (MIRALAX) 17 g Pack, Take 1 Packet by mouth every day., Disp: 30 Each, Rfl: 1  •  psyllium (METAMUCIL) 58.6 % Pack, Take 1 Packet by mouth every day., Disp: 54 Each, Rfl: 1  •   "glycopyrrolate (ROBINUL) 2 MG tablet, , Disp: , Rfl:   •  metroNIDAZOLE (FLAGYL) 250 MG Tab, , Disp: , Rfl:   •  neomycin (MYCIFRADIN) 500 MG Tab, , Disp: , Rfl:   •  hyoscyamine (LEVSIN) 0.125 MG SL Tab, DISSOLVE 1 2 TABLETS UNDER THE TONGUE EVERY 6 HOURS AS NEEDED FOR PAIN (NOT COVERED), Disp: , Rfl:   •  sertraline (ZOLOFT) 50 MG Tab, TAKE 1 TABLET BY MOUTH EVERY DAY, Disp: 90 Tab, Rfl: 3  •  raloxifene (EVISTA) 60 MG Tab, TAKE 1 TABLET BY MOUTH EVERY DAY, Disp: 90 Tab, Rfl: 3  •  Turmeric Powder, by Does not apply route., Disp: , Rfl:   •  vitamin D (CHOLECALCIFEROL) 1000 UNIT Tab, Take 1,000 Units by mouth every day., Disp: , Rfl:   ALLERGIES:   Allergies   Allergen Reactions   • Pcn [Penicillins]    • Ciprofloxacin    • Sulfa Drugs Rash     7/15/19 welting urticaria/rash after taking Bactrim      SURGHX:   Past Surgical History:   Procedure Laterality Date   • ABDOMINAL HYSTERECTOMY TOTAL     • APPENDECTOMY       SOCHX:  reports that she has quit smoking. She smoked 0.00 packs per day. She has never used smokeless tobacco. She reports previous alcohol use of about 2.4 oz of alcohol per week. She reports that she does not use drugs.  FH: Family history was reviewed, no pertinent findings to report     Objective:     /78 (BP Location: Left arm, Patient Position: Sitting, BP Cuff Size: Adult)   Pulse 95   Temp 36.9 °C (98.5 °F) (Temporal)   Resp 18   Ht 1.575 m (5' 2\")   Wt 51.3 kg (113 lb)   SpO2 96%   BMI 20.67 kg/m²      Physical Exam        RIGHT ANKLE:  There is NO swelling noted at the ankle  Range of motion intact with dorsiflexion and plantarflexion, inversion and eversion  There is NO tenderness of the ATFL, CF or PTF ligament  There is NO tenderness of the lateral malleolus or medial malleolus  Anterior drawer testing is NEGATIVE  Talar tilt testing is NEGATIVE  The foot and ankle is otherwise neurovascularly intact    RIGHT FOOT:  There is NO swelling noted at the foot  There is NO " tenderness at the base of the fifth metatarsal, cuboid, or tarsal navicular  There is NO pain with metatarsal squeeze test    LEFT ANKLE:  There is NO swelling noted at the ankle  Range of motion intact with dorsiflexion and plantarflexion, inversion and eversion  There is MILD tenderness of the ATFL, without tenderness of the CF or PTF ligament  There is POSITIVE tenderness of the lateral malleolus and POSITIVE tenderness of the medial malleolus  Anterior drawer testing is NEGATIVE  Talar tilt testing is NEGATIVE  The foot and ankle is otherwise neurovascularly intact  Able to toe off, but she does experience Achilles pain in the LEFT    LEFT FOOT:  There is NO swelling noted at the foot  There is NO tenderness at the base of the fifth metatarsal, cuboid, or tarsal navicular  There is NO pain with metatarsal squeeze test    NEUTRAL stance  Able to ambulate with antalgic gait.  Using short cam walker boot for ambulation    Assessment/Plan:        1. Acute left ankle pain  DX-TIBIA AND FIBULA LEFT    REFERRAL TO PHYSICAL THERAPY   2. Decreased proprioception of joint of foot, left  REFERRAL TO PHYSICAL THERAPY     Date of onset, Saturday, April 3, 2021  She has been walking/shopping  Is predominantly along the LEFT Achilles tendon with some radiation of pain into the medial aspect of the ankle around the medial malleolus  Imaging has NOT been performed    Patient is feeling better in short cam walker boot  Recommended avoiding any pain medications to avoid overdoing it  Recommend wearing CAM Walker boot for an additional 2 weeks    Referred for formal physical therapy for strengthening and particularly proprioceptive training  Patient has had multiple surgeries in the LEFT foot which has resulted in decreased proprioception and the likely cause of her soft tissue/ankle pain    Addendum:  Called and spoke with radiology, not concerned about the wavy pattern of the fibular cortex.  Mentioned it was a normal variant of  unknown cause    We will see her back after she recovers from her colorectal surgery  Hopefully, she will be better at that point and she will have gotten set up to start formal physical therapy for ankle strengthening and balance training        HISTORY/REASON FOR EXAM:  Atraumatic Pain/Swelling/Deformity; medial and lateral malleolus tenderness as well as proximal fibula tenderness        TECHNIQUE/EXAM DESCRIPTION AND NUMBER OF VIEWS:  2 views of the left tibia and fibula, 4/13/2021 10:18 AM.     COMPARISON: None     FINDINGS:  There is no evidence of acute fracture involving the tibia or fibula.  There are no focal soft tissue abnormalities.     IMPRESSION:     Negative tibia fibula series.        Thank you Brenda Berry, A.P.N. for allowing me to participate in caring for your patient.      Greater than 45 minutes was spent reviewing patient history, discussing current issue, physical examination, reviewing results and documenting the visit.

## 2021-04-13 NOTE — OR NURSING
"Patient answered \"Yes\" to question \"Have you recently wished you were dead, recently attempted suicide and/or have a plan in place?\"  During health history, patient reported she was having many health problems and due to Covid pandemic was unable to see her family, and she went down a \"Black hole,\" but is now \"Better.\"  Patient denies any current thoughts or plans of harming herself or others.  She reports she is seeing a therapist, and is communicating with friends.  Patient reports \"I am better, I want to live.\"  Patient is currently on Zoloft. Patient reported she had lost a lot of weight when she was depressed and has now gained some of it back.   "

## 2021-04-14 LAB
SARS-COV-2 RNA RESP QL NAA+PROBE: NOTDETECTED
SPECIMEN SOURCE: NORMAL

## 2021-04-14 NOTE — OR NURSING
Patient in walking boot.  Patient reports she recently went to urgent care due to pain.  Per patient: there is inflammation in her ankle and was put on Diclofenac gel.  Patient was instructed to notify Dr. Kenney of her ankle and to get instructions regarding diclofenac administration prior to surgery.  Patient stated verbal understanding.

## 2021-04-18 NOTE — OR NURSING
COVID-19 Pre-surgery screenin. Do you have an undiagnosed respiratory illness or symptoms such as coughing or sneezing? *No** (Yes/No)   a. Onset of Sx *No**  b. Acute vs. chronic respiratory illness * No **    2. Do you have an unexplained fever that’s greater than 100.4 degrees     * No ** (Yes/No)     3. Have you had direct exposure to a patient who tested positive for Covid-19?     * No ** (Yes/No)    4. Have you had any loss of your sense of taste or smell? Have you had N/V or sore throat? * No **    5. Patient has been informed of visitor policy and asked to wear a mask upon entering the hospital   *YES** (Yes/No)

## 2021-04-19 ENCOUNTER — ANESTHESIA EVENT (OUTPATIENT)
Dept: SURGERY | Facility: MEDICAL CENTER | Age: 66
End: 2021-04-19
Payer: MEDICARE

## 2021-04-19 ENCOUNTER — HOSPITAL ENCOUNTER (OUTPATIENT)
Facility: MEDICAL CENTER | Age: 66
End: 2021-04-19
Attending: SURGERY | Admitting: SURGERY
Payer: MEDICARE

## 2021-04-19 ENCOUNTER — ANESTHESIA (OUTPATIENT)
Dept: SURGERY | Facility: MEDICAL CENTER | Age: 66
End: 2021-04-19
Payer: MEDICARE

## 2021-04-19 VITALS
BODY MASS INDEX: 21.18 KG/M2 | SYSTOLIC BLOOD PRESSURE: 123 MMHG | HEART RATE: 82 BPM | DIASTOLIC BLOOD PRESSURE: 69 MMHG | HEIGHT: 62 IN | OXYGEN SATURATION: 92 % | RESPIRATION RATE: 16 BRPM | WEIGHT: 115.08 LBS | TEMPERATURE: 97.5 F

## 2021-04-19 DIAGNOSIS — G89.18 POST-OP PAIN: ICD-10-CM

## 2021-04-19 LAB — PATHOLOGY CONSULT NOTE: NORMAL

## 2021-04-19 PROCEDURE — 160002 HCHG RECOVERY MINUTES (STAT): Performed by: SURGERY

## 2021-04-19 PROCEDURE — 700101 HCHG RX REV CODE 250: Performed by: ANESTHESIOLOGY

## 2021-04-19 PROCEDURE — A9270 NON-COVERED ITEM OR SERVICE: HCPCS | Performed by: SURGERY

## 2021-04-19 PROCEDURE — 700102 HCHG RX REV CODE 250 W/ 637 OVERRIDE(OP): Performed by: ANESTHESIOLOGY

## 2021-04-19 PROCEDURE — 88304 TISSUE EXAM BY PATHOLOGIST: CPT

## 2021-04-19 PROCEDURE — A9270 NON-COVERED ITEM OR SERVICE: HCPCS | Performed by: ANESTHESIOLOGY

## 2021-04-19 PROCEDURE — 160035 HCHG PACU - 1ST 60 MINS PHASE I: Performed by: SURGERY

## 2021-04-19 PROCEDURE — 160048 HCHG OR STATISTICAL LEVEL 1-5: Performed by: SURGERY

## 2021-04-19 PROCEDURE — 501838 HCHG SUTURE GENERAL: Performed by: SURGERY

## 2021-04-19 PROCEDURE — A6403 STERILE GAUZE>16 <= 48 SQ IN: HCPCS | Performed by: SURGERY

## 2021-04-19 PROCEDURE — 700111 HCHG RX REV CODE 636 W/ 250 OVERRIDE (IP): Performed by: ANESTHESIOLOGY

## 2021-04-19 PROCEDURE — 700101 HCHG RX REV CODE 250: Performed by: SURGERY

## 2021-04-19 PROCEDURE — A6407 PACKING STRIPS, NON-IMPREG: HCPCS | Performed by: SURGERY

## 2021-04-19 PROCEDURE — 160046 HCHG PACU - 1ST 60 MINS PHASE II: Performed by: SURGERY

## 2021-04-19 PROCEDURE — 160009 HCHG ANES TIME/MIN: Performed by: SURGERY

## 2021-04-19 PROCEDURE — 160027 HCHG SURGERY MINUTES - 1ST 30 MINS LEVEL 2: Performed by: SURGERY

## 2021-04-19 PROCEDURE — 160038 HCHG SURGERY MINUTES - EA ADDL 1 MIN LEVEL 2: Performed by: SURGERY

## 2021-04-19 PROCEDURE — 160047 HCHG PACU  - EA ADDL 30 MINS PHASE II: Performed by: SURGERY

## 2021-04-19 PROCEDURE — 700105 HCHG RX REV CODE 258: Performed by: SURGERY

## 2021-04-19 PROCEDURE — 160025 RECOVERY II MINUTES (STATS): Performed by: SURGERY

## 2021-04-19 PROCEDURE — 502704 HCHG DEVICE, LIGASURE IMPACT: Performed by: SURGERY

## 2021-04-19 RX ORDER — NITROGLYCERIN 20 MG/G
0.5 OINTMENT TOPICAL 4 TIMES DAILY
COMMUNITY
Start: 2021-03-23 | End: 2022-12-21 | Stop reason: SDUPTHER

## 2021-04-19 RX ORDER — DIPHENHYDRAMINE HYDROCHLORIDE 50 MG/ML
12.5 INJECTION INTRAMUSCULAR; INTRAVENOUS
Status: DISCONTINUED | OUTPATIENT
Start: 2021-04-19 | End: 2021-04-19 | Stop reason: HOSPADM

## 2021-04-19 RX ORDER — OXYCODONE HCL 5 MG/5 ML
10 SOLUTION, ORAL ORAL
Status: COMPLETED | OUTPATIENT
Start: 2021-04-19 | End: 2021-04-19

## 2021-04-19 RX ORDER — HYDROMORPHONE HYDROCHLORIDE 1 MG/ML
0.1 INJECTION, SOLUTION INTRAMUSCULAR; INTRAVENOUS; SUBCUTANEOUS
Status: DISCONTINUED | OUTPATIENT
Start: 2021-04-19 | End: 2021-04-19 | Stop reason: HOSPADM

## 2021-04-19 RX ORDER — DEXAMETHASONE SODIUM PHOSPHATE 4 MG/ML
INJECTION, SOLUTION INTRA-ARTICULAR; INTRALESIONAL; INTRAMUSCULAR; INTRAVENOUS; SOFT TISSUE PRN
Status: DISCONTINUED | OUTPATIENT
Start: 2021-04-19 | End: 2021-04-19 | Stop reason: SURG

## 2021-04-19 RX ORDER — ONDANSETRON 2 MG/ML
INJECTION INTRAMUSCULAR; INTRAVENOUS PRN
Status: DISCONTINUED | OUTPATIENT
Start: 2021-04-19 | End: 2021-04-19 | Stop reason: SURG

## 2021-04-19 RX ORDER — LIDOCAINE HYDROCHLORIDE 20 MG/ML
INJECTION, SOLUTION EPIDURAL; INFILTRATION; INTRACAUDAL; PERINEURAL PRN
Status: DISCONTINUED | OUTPATIENT
Start: 2021-04-19 | End: 2021-04-19 | Stop reason: SURG

## 2021-04-19 RX ORDER — CEFAZOLIN SODIUM 1 G/3ML
INJECTION, POWDER, FOR SOLUTION INTRAMUSCULAR; INTRAVENOUS PRN
Status: DISCONTINUED | OUTPATIENT
Start: 2021-04-19 | End: 2021-04-19 | Stop reason: SURG

## 2021-04-19 RX ORDER — GABAPENTIN 300 MG/1
300 CAPSULE ORAL ONCE
Status: COMPLETED | OUTPATIENT
Start: 2021-04-19 | End: 2021-04-19

## 2021-04-19 RX ORDER — HALOPERIDOL 5 MG/ML
1 INJECTION INTRAMUSCULAR
Status: DISCONTINUED | OUTPATIENT
Start: 2021-04-19 | End: 2021-04-19 | Stop reason: HOSPADM

## 2021-04-19 RX ORDER — MIDAZOLAM HYDROCHLORIDE 1 MG/ML
INJECTION INTRAMUSCULAR; INTRAVENOUS PRN
Status: DISCONTINUED | OUTPATIENT
Start: 2021-04-19 | End: 2021-04-19 | Stop reason: SURG

## 2021-04-19 RX ORDER — HYDROMORPHONE HYDROCHLORIDE 1 MG/ML
0.2 INJECTION, SOLUTION INTRAMUSCULAR; INTRAVENOUS; SUBCUTANEOUS
Status: DISCONTINUED | OUTPATIENT
Start: 2021-04-19 | End: 2021-04-19 | Stop reason: HOSPADM

## 2021-04-19 RX ORDER — SUCCINYLCHOLINE/SOD CL,ISO/PF 200MG/10ML
SYRINGE (ML) INTRAVENOUS PRN
Status: DISCONTINUED | OUTPATIENT
Start: 2021-04-19 | End: 2021-04-19 | Stop reason: SURG

## 2021-04-19 RX ORDER — HYDROMORPHONE HYDROCHLORIDE 1 MG/ML
0.4 INJECTION, SOLUTION INTRAMUSCULAR; INTRAVENOUS; SUBCUTANEOUS
Status: DISCONTINUED | OUTPATIENT
Start: 2021-04-19 | End: 2021-04-19 | Stop reason: HOSPADM

## 2021-04-19 RX ORDER — OXYCODONE HYDROCHLORIDE 5 MG/1
5 TABLET ORAL EVERY 6 HOURS PRN
Qty: 10 TABLET | Refills: 0 | Status: SHIPPED | OUTPATIENT
Start: 2021-04-19 | End: 2021-04-24

## 2021-04-19 RX ORDER — ACETAMINOPHEN 500 MG
1000 TABLET ORAL ONCE
Status: COMPLETED | OUTPATIENT
Start: 2021-04-19 | End: 2021-04-19

## 2021-04-19 RX ORDER — ONDANSETRON 2 MG/ML
4 INJECTION INTRAMUSCULAR; INTRAVENOUS
Status: DISCONTINUED | OUTPATIENT
Start: 2021-04-19 | End: 2021-04-19 | Stop reason: HOSPADM

## 2021-04-19 RX ORDER — OXYCODONE HCL 5 MG/5 ML
5 SOLUTION, ORAL ORAL
Status: COMPLETED | OUTPATIENT
Start: 2021-04-19 | End: 2021-04-19

## 2021-04-19 RX ORDER — SODIUM CHLORIDE, SODIUM LACTATE, POTASSIUM CHLORIDE, CALCIUM CHLORIDE 600; 310; 30; 20 MG/100ML; MG/100ML; MG/100ML; MG/100ML
INJECTION, SOLUTION INTRAVENOUS CONTINUOUS
Status: DISCONTINUED | OUTPATIENT
Start: 2021-04-19 | End: 2021-04-19 | Stop reason: HOSPADM

## 2021-04-19 RX ADMIN — MIDAZOLAM HYDROCHLORIDE 2 MG: 1 INJECTION, SOLUTION INTRAMUSCULAR; INTRAVENOUS at 11:18

## 2021-04-19 RX ADMIN — POVIDONE IODINE 15 ML: 100 SOLUTION TOPICAL at 10:53

## 2021-04-19 RX ADMIN — LIDOCAINE HYDROCHLORIDE 50 MG: 20 INJECTION, SOLUTION EPIDURAL; INFILTRATION; INTRACAUDAL at 11:24

## 2021-04-19 RX ADMIN — DEXAMETHASONE SODIUM PHOSPHATE 4 MG: 4 INJECTION, SOLUTION INTRA-ARTICULAR; INTRALESIONAL; INTRAMUSCULAR; INTRAVENOUS; SOFT TISSUE at 11:27

## 2021-04-19 RX ADMIN — ONDANSETRON 4 MG: 2 INJECTION INTRAMUSCULAR; INTRAVENOUS at 11:27

## 2021-04-19 RX ADMIN — SODIUM CHLORIDE, POTASSIUM CHLORIDE, SODIUM LACTATE AND CALCIUM CHLORIDE: 600; 310; 30; 20 INJECTION, SOLUTION INTRAVENOUS at 11:03

## 2021-04-19 RX ADMIN — FENTANYL CITRATE 100 MCG: 50 INJECTION, SOLUTION INTRAMUSCULAR; INTRAVENOUS at 11:24

## 2021-04-19 RX ADMIN — OXYCODONE HYDROCHLORIDE 5 MG: 5 SOLUTION ORAL at 12:32

## 2021-04-19 RX ADMIN — LIDOCAINE HYDROCHLORIDE 0.5 ML: 10 INJECTION, SOLUTION EPIDURAL; INFILTRATION; INTRACAUDAL at 10:53

## 2021-04-19 RX ADMIN — GABAPENTIN 300 MG: 300 CAPSULE ORAL at 10:53

## 2021-04-19 RX ADMIN — CEFAZOLIN 2 G: 330 INJECTION, POWDER, FOR SOLUTION INTRAMUSCULAR; INTRAVENOUS at 11:27

## 2021-04-19 RX ADMIN — ACETAMINOPHEN 1000 MG: 500 TABLET ORAL at 10:53

## 2021-04-19 RX ADMIN — Medication 100 MG: at 11:24

## 2021-04-19 RX ADMIN — PROPOFOL 100 MG: 10 INJECTION, EMULSION INTRAVENOUS at 11:24

## 2021-04-19 ASSESSMENT — FIBROSIS 4 INDEX: FIB4 SCORE: 1.74

## 2021-04-19 ASSESSMENT — PAIN DESCRIPTION - PAIN TYPE: TYPE: SURGICAL PAIN

## 2021-04-19 NOTE — DISCHARGE INSTRUCTIONS
POST-OPERATIVE HEMORRHOIDECTOMY INSTRUCTIONS      1.  Carefully remove bandage on the day after surgery.  There is a gauze with the anal canal that does not need to be removed and will fall out at the time of the first bowel movement. You may shower. Good hygiene is essential for proper healing.  Wearing soft gauze pads or sanitary napkins in your underwear helps to control fluid drainage, discharge of mucous, and bleeding. Change pads and underwear frequently.    2. There are no specific dietary restrictions associated with this surgery. Avoid foods which make you constipated. Be sure to include wheat bran, fresh fruits and plenty of vegetables in your diet. Drink 7-8 glasses of water per day.    3. Avoid straining, especially a few days after surgery, when you may experience swelling that feels like an incompletely passed stool or a hemorrhoid. Milk of magnesia should be taken every day starting day of surgery until your first bowel movement.  After the first bowel movement, daily Miralax can be taken for the next 2-3 weeks. Additionally, we advise that you use an over-the-counter stool softener, such as Colace twice daily. Please call our office if no bowel movement within 72 hours.    4. Moderate exercise is healthy. You may walk as much as you like. You are permitted to go up and down stairs slowly while using a handrail. Resume normal activities after first postoperative clinic visit. Avoid heavy lifting or strenuous activity until that time.    5. You may drive when you are comfortable enough to react and move quickly in an emergency and are no longer taking narcotics (usually 1-2 weeks after surgery). Long trips over 25 miles are not advised.    6. Return to work when you feel you are able o work with the restrictions as noted above.    7. For pain, one of the Ibuprofen compounds (Advil, Nuprin, etc.) or Tylenol is suggested. Should these not be effective in managing your discomfort, prescription pain  medications should be used sparingly to avoid constipation.     8. Please call the office to report any of the following:  Temperature of 101 or higher.  Signs of infection, possibly including increasing redness, warmth, tenderness, drainage, foul odor at surgery site  Persistent moderate to severe pain  No bowel movement within 72 hours  Inability to void    9.  Please call office at [phone] to make a follow-up appointment in 4 weeks.      Estela Kenney M.D.   [facility]  [address]      ACTIVITY: Rest and take it easy for the first 24 hours.  A responsible adult is recommended to remain with you during that time.  It is normal to feel sleepy.  We encourage you to not do anything that requires balance, judgment or coordination.    MILD FLU-LIKE SYMPTOMS ARE NORMAL. YOU MAY EXPERIENCE GENERALIZED MUSCLE ACHES, THROAT IRRITATION, HEADACHE AND/OR SOME NAUSEA.    FOR 24 HOURS DO NOT:  Drive, operate machinery or run household appliances.  Drink beer or alcoholic beverages.   Make important decisions or sign legal documents.    SPECIAL INSTRUCTIONS: Follow instructions from Surgeon as provided above.     DIET: To avoid nausea, slowly advance diet as tolerated, avoiding spicy or greasy foods for the first day.  Add more substantial food to your diet according to your physician's instructions.  INCREASE FLUIDS AND FIBER TO AVOID CONSTIPATION.    SURGICAL DRESSING/BATHING: Carefully remove bandage on the day after surgery.  There is a gauze with the anal canal that does not need to be removed and will fall out at the time of the first bowel movement. You may shower. Good hygiene is essential for proper healing.  Wearing soft gauze pads or sanitary napkins in your underwear helps to control fluid drainage, discharge of mucous, and bleeding. Change pads and underwear frequently.    FOLLOW-UP APPOINTMENT:  A follow-up appointment should be arranged with your doctor in 1-2 weeks; call to  schedule.    You should CALL YOUR PHYSICIAN if you develop:  Fever greater than 101 degrees F.  Pain not relieved by medication, or persistent nausea or vomiting.  Excessive bleeding (blood soaking through dressing) or unexpected drainage from the wound.  Extreme redness or swelling around the incision site, drainage of pus or foul smelling drainage.  Inability to urinate or empty your bladder within 8 hours.  Problems with breathing or chest pain.    You should call 911 if you develop problems with breathing or chest pain.  If you are unable to contact your doctor or surgical center, you should go to the nearest emergency room or urgent care center.  Physician's telephone #: [phone]    If any questions arise, call your doctor.  If your doctor is not available, please feel free to call the Surgical Center at [phone]. The Contact Center is open Monday through Friday 7AM to 5PM and may speak to a nurse at [phone], or toll free at [phone].     A registered nurse may call you a few days after your surgery to see how you are doing after your procedure.    MEDICATIONS: Resume taking daily medication.  Take prescribed pain medication with food.  If no medication is prescribed, you may take non-aspirin pain medication if needed.  PAIN MEDICATION CAN BE VERY CONSTIPATING.  Take a stool softener or laxative such as senokot, pericolace, or milk of magnesia if needed.    Prescription given electronically.  Last pain medication given at 12:32 PM.    If your physician has prescribed pain medication that includes Acetaminophen (Tylenol), do not take additional Acetaminophen (Tylenol) while taking the prescribed medication.    Depression / Suicide Risk    As you are discharged from this [facility] facility, it is important to learn how to keep safe from harming yourself.    Recognize the warning signs:  · Abrupt changes in personality, positive or negative- including increase in energy   · Giving away  possessions  · Change in eating patterns- significant weight changes-  positive or negative  · Change in sleeping patterns- unable to sleep or sleeping all the time   · Unwillingness or inability to communicate  · Depression  · Unusual sadness, discouragement and loneliness  · Talk of wanting to die  · Neglect of personal appearance   · Rebelliousness- reckless behavior  · Withdrawal from people/activities they love  · Confusion- inability to concentrate     If you or a loved one observes any of these behaviors or has concerns about self-harm, here's what you can do:  · Talk about it- your feelings and reasons for harming yourself  · Remove any means that you might use to hurt yourself (examples: pills, rope, extension cords, firearm)  · Get professional help from the community (Mental Health, Substance Abuse, psychological counseling)  · Do not be alone:Call your Safe Contact- someone whom you trust who will be there for you.  · Call your local CRISIS HOTLINE 978-0361 or 419-827-0100  · Call your local Children's Mobile Crisis Response Team [place] (312) 427-8824 or www.[employer]  · Call the toll free National Suicide Prevention Hotlines   · National Suicide Prevention Lifeline 959-296-GQZI (3730)  · National Hope Line Network 800-SUICIDE (079-5961)

## 2021-04-19 NOTE — ANESTHESIA PREPROCEDURE EVALUATION
Relevant Problems   PULMONARY   (+) Shortness of breath      CARDIAC   (+) Essential hypertension      Other   (+) Rectal prolapse vs prolapsed hemorroids        Physical Exam    Airway   Mallampati: II  TM distance: >3 FB  Neck ROM: full       Cardiovascular - normal exam  Rhythm: regular  Rate: normal  (-) murmur     Dental - normal exam           Pulmonary - normal exam  Breath sounds clear to auscultation     Abdominal    Neurological - normal exam                 Anesthesia Plan    ASA 2       Plan - general       Airway plan will be ETT          Induction: intravenous    Postoperative Plan: Postoperative administration of opioids is intended.    Pertinent diagnostic labs and testing reviewed    Informed Consent:    Anesthetic plan and risks discussed with patient.    Use of blood products discussed with: patient whom consented to blood products.

## 2021-04-19 NOTE — ANESTHESIA POSTPROCEDURE EVALUATION
Patient: Funmilayo Egan    Procedure Summary     Date: 04/19/21 Room / Location: [facility] 09 / SURGERY [facility]    Anesthesia Start: 1118 Anesthesia Stop: 1214    Procedure: HEMORRHOIDECTOMY - INTERNAL AND EXTERNAL, COMPLEX OR EXTENSIVE. (N/A Anus) Diagnosis: (HEMORRHOIDS THAT PROLPASE WITH STRAINING AND REQUIRE MANUAL REPLACEMENT BACK INSIDE ANAL CANAL)    Surgeons: Estela Kenney M.D. Responsible Provider: Javier Lino M.D.    Anesthesia Type: general ASA Status: 2          Final Anesthesia Type: general  Last vitals  BP   Blood Pressure : 128/88    Temp   36.1 °C (97 °F)    Pulse   73   Resp   14    SpO2   95 %      Anesthesia Post Evaluation    Patient location during evaluation: PACU  Patient participation: complete - patient participated  Level of consciousness: awake and alert    Airway patency: patent  Anesthetic complications: no  Cardiovascular status: hemodynamically stable  Respiratory status: acceptable  Hydration status: euvolemic    PONV: none          No complications documented.     Nurse Pain Score: 5 (NPRS)

## 2021-04-19 NOTE — PROGRESS NOTES
Med rec updated and complete  Allergies reviewed  Pt had a list of medications at bedside, pt read list of medications to this writer   Pt reports no antibiotics in the last 2 weeks

## 2021-04-19 NOTE — OR NURSING
Patients VSS; denies N/V; states pain is at tolerable level. Pt changed into clothing with assistance. Pt up and ambulated to BR, steady gait, voided adequately. Discharge instructions given as well as pain management handout; pt and family verbalized understanding and questions answered. Patient states she is ready to go home. Prescriptions given electronically. Pt discharged to .

## 2021-04-19 NOTE — OR NURSING
Arrived to Phase II after report from Maggie PADGETT.     VSS, denies nausea, reports pain 2/10 and tolerable. Ambulated from gurney to chair with standby assist.    Surgical site with fluffy gauzes and scant amount of serosanguinous drainage.     Alarms on and set to appropriate parameters. Call light within reach, rounding in place.

## 2021-04-19 NOTE — OR NURSING
Patient is awake, alert, oriented, denies pain, denies nausea, vital signs stable on room air. Dressing CDI, fluffed gauze, scant serosanguinous drainage.     Report given to Neha PADGETT    PACU II

## 2021-04-19 NOTE — ANESTHESIA TIME REPORT
Anesthesia Start and Stop Event Times     Date Time Event    4/19/2021 1114 Ready for Procedure     1118 Anesthesia Start     1214 Anesthesia Stop        Responsible Staff  04/19/21    Name Role Begin End    Javier Lino M.D. Anesth 1118 1214        Preop Diagnosis (Free Text):  Pre-op Diagnosis     HEMORRHOIDS THAT PROLPASE WITH STRAINING AND REQUIRE MANUAL REPLACEMENT BACK INSIDE ANAL CANAL        Preop Diagnosis (Codes):    Post op Diagnosis  Hemorrhoid      Premium Reason  Non-Premium    Comments:

## 2021-04-19 NOTE — ANESTHESIA PROCEDURE NOTES
Airway    Date/Time: 4/19/2021 11:26 AM  Performed by: Javier Lino M.D.  Authorized by: Javier Lino M.D.     Location:  OR  Urgency:  Elective  Indications for Airway Management:  Anesthesia      Spontaneous Ventilation: absent    Sedation Level:  Deep  Preoxygenated: Yes    Patient Position:  Sniffing  Mask Difficulty Assessment:  0 - not attempted  Final Airway Type:  Endotracheal airway  Final Endotracheal Airway:  ETT  Cuffed: Yes    Technique Used for Successful ETT Placement:  Direct laryngoscopy    Insertion Site:  Oral  Blade Type:  Mee  Laryngoscope Blade/Videolaryngoscope Blade Size:  3  ETT Size (mm):  7.0  Measured from:  Teeth  ETT to Teeth (cm):  20  Placement Verified by: auscultation and capnometry    Cormack-Lehane Classification:  Grade I - full view of glottis  Number of Attempts at Approach:  1

## 2021-04-19 NOTE — OP REPORT
Operative Report     Date of Procedure:  4/19/2021     PreOp Diagnosis:   Grade 3 anterior midline internal/external hemorrhoid     PostOp Diagnosis:   Same     Procedure(s):  Hemorrhoidectomy of 1 pedicle (complex)    Surgeon:  Estela Kenney M.D.     Assistant:  None    Anesthesiologist/Type of Anesthesia:  Anesthesiologist: Javier Lino M.D./General     Specimen:  Anterior midline hemorrhoid     Estimated Blood Loss:  10 cc    Wound class:  Dirty, 4     Findings:   -Grade 3 internal/external anterior midline hemorrhoid  -No other hemorrhoids visualized     Complications:  None    Counts:  Correct     Indications:  65-year-old female with symptomatic prolapsing anterior midline hemorrhoid that requires manual reduction with recurrent prolapse.  She wanted to have this removed due to symptoms.     Operative Procedure:  The patient was brought to the operating suite and remained on the stretcher.  The patient was placed on cardiopulmonary monitors and general endotracheal anesthesia was induced.  She was rolled into the prone position on bolsters.  Both arms were swaddled at the sides.  The legs were padded and a blanket was placed as well as bilateral SCDs.  Safety leg strap and back strap were applied.  The buttocks were distracted laterally with tape. 2 g of cefotetan were administered. The perineum was prepped and draped in usual sterile fashion.  The preoperative safety checklist was performed.    A digital rectal exam was performed and there were no palpable masses.  Anoscopy was performed circumferentially and there was no evidence of hemorrhoids other than the anterior midline hemorrhoid.  This was grasped and retracted with a Allis clamp.  The electrocautery was used to incise the skin just beyond the anal verge to excise the external portion of the hemorrhoid.  This was deepened along the hemorrhoidal tissue and the sphincter was identified and the fibers were swept away to protect the sphincter.   Once the sphincter fibers were clear the LigaSure was applied and the hemorrhoid was divided and removed.  It was sent to pathology.  There was good hemostasis.  A 2-0 Vicryl was placed as a figure-of-eight at the pedicle.  The hemorrhoid was closed with a running 3-0 Vicryl.    A piece of Surgicel was left in the anal canal.  20 cc of Exparel were injected as a pudendal block, intersphincteric, and superficial around the skin of the anal verge.    Patient tolerated the procedure well.  She was stable and extubated and brought to recovery.

## 2021-04-30 ENCOUNTER — OFFICE VISIT (OUTPATIENT)
Dept: MEDICAL GROUP | Facility: MEDICAL CENTER | Age: 66
End: 2021-04-30
Payer: MEDICARE

## 2021-04-30 VITALS
TEMPERATURE: 98.6 F | WEIGHT: 113 LBS | SYSTOLIC BLOOD PRESSURE: 124 MMHG | HEART RATE: 78 BPM | DIASTOLIC BLOOD PRESSURE: 78 MMHG | OXYGEN SATURATION: 95 % | HEIGHT: 62 IN | BODY MASS INDEX: 20.8 KG/M2

## 2021-04-30 DIAGNOSIS — K62.89 RECTAL PAIN: ICD-10-CM

## 2021-04-30 DIAGNOSIS — R10.84 GENERALIZED ABDOMINAL PAIN: ICD-10-CM

## 2021-04-30 DIAGNOSIS — I10 ESSENTIAL HYPERTENSION: ICD-10-CM

## 2021-04-30 PROBLEM — F41.9 ANXIETY: Status: RESOLVED | Noted: 2019-03-12 | Resolved: 2021-04-30

## 2021-04-30 PROBLEM — R06.02 SHORTNESS OF BREATH: Status: RESOLVED | Noted: 2019-04-11 | Resolved: 2021-04-30

## 2021-04-30 PROCEDURE — 99213 OFFICE O/P EST LOW 20 MIN: CPT | Performed by: FAMILY MEDICINE

## 2021-04-30 ASSESSMENT — FIBROSIS 4 INDEX: FIB4 SCORE: 1.74

## 2021-04-30 NOTE — PROGRESS NOTES
Subjective:     CC: Diagnoses of Rectal pain, Generalized abdominal pain, and Essential hypertension were pertinent to this visit.    HPI: Patient is a 65 y.o. female established patient who presents today to f/u on abdominal pain, rectal pain      Rectal pain  S/p hemorrhoidectomy about 1 week ago.   Has appt in 1 week with Dr. Kenney.   Patient reports quite significant rectal discomfort still. She is concerned about possible infection. Would like it looked at.     Generalized abdominal pain  Chronic problem. Now seeing Dr. Davila at [facility].   Had done an elimination diet, but now starting to expand her diet. Doing quite well regarding abdominal pain.   Now gaining weight again.   Her GI doctor felt that pain was secondary to anal fissure/hemorrhoids/prolapsing hemorrhoids.     Essential hypertension  Chronic prolem. BP looks great today. Currently on amlodpine 2.5mg.       Past Medical History:   Diagnosis Date   • ASTHMA     remote hx   • Bowel habit changes    • Chest discomfort 04/2019    Coronary CT scan with LAD 11.4.    • COPD     mild, not on meds for it   • Hyperlipidemia    • Hypertension    • OSTEOPOROSIS    • Shortness of breath 05/2019    Echocardiogram with normal LV size, LVEF 65%.Trace MR. Mild TR. RVSP 30mmHg.       Social History     Tobacco Use   • Smoking status: Former Smoker     Packs/day: 0.00   • Smokeless tobacco: Never Used   • Tobacco comment: 20 y.a   Substance Use Topics   • Alcohol use: Not Currently     Alcohol/week: 2.4 oz     Types: 4 Glasses of wine per week     Comment: stopped in 2020   • Drug use: Yes     Types: Oral     Comment: 4/17/2021       Current Outpatient Medications Ordered in Epic   Medication Sig Dispense Refill   • NITRO-BID 2 % Ointment Place 0.5 Inches on the skin 4 times a day. On bottom     • amLODIPine (NORVASC) 2.5 MG Tab Take 1 tablet by mouth every day. 30 tablet 1   • diclofenac sodium 1 % Gel Apply topically BID to ankle (Patient taking differently: Apply 1  "Application topically 2 times a day. Apply topically BID to ankle) 150 g 0   • docusate sodium 100 MG Cap Take 100 mg by mouth 2 Times a Day. 60 capsule 1   • polyethylene glycol/lytes (MIRALAX) 17 g Pack Take 1 Packet by mouth every day. (Patient taking differently: Take 17 g by mouth 2 times a day.) 30 Each 1   • psyllium (METAMUCIL) 58.6 % Pack Take 1 Packet by mouth every day. 54 Each 1   • sertraline (ZOLOFT) 50 MG Tab TAKE 1 TABLET BY MOUTH EVERY DAY (Patient taking differently: Take 50 mg by mouth every evening.) 90 Tab 3   • raloxifene (EVISTA) 60 MG Tab TAKE 1 TABLET BY MOUTH EVERY DAY (Patient taking differently: Take 60 mg by mouth every day.) 90 Tab 3   • Turmeric Powder Take 1 Scoop by mouth every day.     • Cholecalciferol (VITAMIN D) 125 MCG (5000 UT) Cap Take 5,000 Units by mouth every day.       No current [facility]-ordered facility-administered medications on file.       Allergies:  Ciprofloxacin, Pcn [penicillins], and Sulfa drugs    Health Maintenance: Completed    ROS:  Pulm: no sob, no cough  CV: no chest pain, no palpitations        Objective:       Exam:  /78 (BP Location: Right arm, Patient Position: Sitting, BP Cuff Size: Adult long)   Pulse 78   Temp 37 °C (98.6 °F) (Temporal)   Ht 1.575 m (5' 2\")   Wt 51.3 kg (113 lb)   SpO2 95%   BMI 20.67 kg/m²  Body mass index is 20.67 kg/m².    General: Normal appearing. No distress.  HEAD: NCAT  EYES: conjunctiva clear, lids without ptosis, pupils equal and reactive to light  EARS: ears normal shape and contour.  MOUTH: normal dentition   Neck:  Normal ROM  Pulmonary: Normal effort. Normal respiratory rate.  Cardiovascular: Well perfused. No LE edema  : Hemorrhoidectomy site appears to be healing well.  There is some granulation tissue, no other sign of infection.  No discharge, no surrounding redness.  Neurologic: Grossly normal, no focal deficits  Skin: Warm and dry.  No obvious lesions.  Musculoskeletal: Normal gait and station.   Psych: " Normal mood and affect. Alert and oriented x3. Judgment and insight is normal.    Labs: 4/13/21 Results reviewed and discussed with the patient, questions answered.    Assessment & Plan:     65 y.o. female with the following -     1. Rectal pain  Chronic problem, overall improved since her hemorrhoidectomy, however, still having some postop pain.  Concerned about possible infection.  Exam looked okay today.  She does have a small amount of granulation tissue, no sign of infection, no redness, no discharge.    2. Generalized abdominal pain  New problem, improving with hemorrhoidectomy and lamination diet.  Eating more food now.  Gaining weight again.    3. Essential hypertension  Chronic problem, well-controlled on amlodipine 2.5 mg.      No follow-ups on file.    Please note that this dictation was created using voice recognition software. I have made every reasonable attempt to correct obvious errors, but I expect that there are errors of grammar and possibly content that I did not discover before finalizing the note.

## 2021-04-30 NOTE — ASSESSMENT & PLAN NOTE
Chronic problem. Now seeing Dr. ferris at [facility].   Had done an elimination diet, but now starting to expand her diet. Doing quite well regarding abdominal pain.   Now gaining weight again.   Her GI doctor felt that pain was secondary to anal fissure/hemorrhoids/prolapsing hemorrhoids.

## 2021-05-05 NOTE — TELEPHONE ENCOUNTER
Was the patient seen in the last year in this department? Yes   Does patient have an active prescription for medications requested? No   Received Request Via: Pharmacy  Dr. Baker, I don't see pt's rx on file. Please advise if you'd like to see pt first?

## 2021-05-06 RX ORDER — DICYCLOMINE HYDROCHLORIDE 10 MG/1
CAPSULE ORAL
Qty: 90 CAPSULE | Refills: 0 | Status: SHIPPED | OUTPATIENT
Start: 2021-05-06 | End: 2021-05-24

## 2021-05-24 RX ORDER — DICYCLOMINE HYDROCHLORIDE 10 MG/1
CAPSULE ORAL
Qty: 90 CAPSULE | Refills: 0 | Status: SHIPPED | OUTPATIENT
Start: 2021-05-24 | End: 2021-09-10

## 2021-07-31 DIAGNOSIS — M81.0 AGE-RELATED OSTEOPOROSIS WITHOUT CURRENT PATHOLOGICAL FRACTURE: ICD-10-CM

## 2021-08-02 RX ORDER — RALOXIFENE HYDROCHLORIDE 60 MG/1
TABLET, FILM COATED ORAL
Qty: 90 TABLET | Refills: 3 | Status: SHIPPED | OUTPATIENT
Start: 2021-08-02 | End: 2022-08-01

## 2021-08-20 DIAGNOSIS — F41.9 ANXIETY: ICD-10-CM

## 2021-08-20 DIAGNOSIS — F63.3 HAIR PULLING: ICD-10-CM

## 2021-09-10 ENCOUNTER — OFFICE VISIT (OUTPATIENT)
Dept: MEDICAL GROUP | Facility: MEDICAL CENTER | Age: 66
End: 2021-09-10
Payer: MEDICARE

## 2021-09-10 VITALS
WEIGHT: 121 LBS | HEART RATE: 85 BPM | HEIGHT: 62 IN | DIASTOLIC BLOOD PRESSURE: 70 MMHG | BODY MASS INDEX: 22.26 KG/M2 | SYSTOLIC BLOOD PRESSURE: 122 MMHG | TEMPERATURE: 97.6 F | OXYGEN SATURATION: 97 %

## 2021-09-10 DIAGNOSIS — K62.89 RECTAL PAIN: ICD-10-CM

## 2021-09-10 DIAGNOSIS — M25.511 CHRONIC RIGHT SHOULDER PAIN: ICD-10-CM

## 2021-09-10 DIAGNOSIS — K21.9 GASTROESOPHAGEAL REFLUX DISEASE WITHOUT ESOPHAGITIS: ICD-10-CM

## 2021-09-10 DIAGNOSIS — R10.84 GENERALIZED ABDOMINAL PAIN: ICD-10-CM

## 2021-09-10 DIAGNOSIS — G89.29 CHRONIC RIGHT SHOULDER PAIN: ICD-10-CM

## 2021-09-10 DIAGNOSIS — N64.4 BREAST PAIN, RIGHT: ICD-10-CM

## 2021-09-10 PROCEDURE — 99214 OFFICE O/P EST MOD 30 MIN: CPT | Performed by: FAMILY MEDICINE

## 2021-09-10 RX ORDER — MULTIVIT-MIN/IRON/FOLIC/HRB186 3.3 MG-25
TABLET ORAL
COMMUNITY
End: 2022-05-17

## 2021-09-10 ASSESSMENT — FIBROSIS 4 INDEX: FIB4 SCORE: 1.77

## 2021-09-10 NOTE — ASSESSMENT & PLAN NOTE
Has history of Sue's  Had endoscopy on 8/3/21  Patient states it looked good.   Has had weight gain, able to eat more.

## 2021-09-10 NOTE — ASSESSMENT & PLAN NOTE
Right shoulder pain  Worst at night  Has done PT in the past on that shoulder  Pain with reaching over the head  X 1.5 mo  Notes a lot of discomfort in the back.   Had a fall 15 yrs ago which caused dislocation. Did well following PT.      Right breast pain for about 4 weeks.   No skin changes. No rashes.  Burning pain starts in her armpit and goes into the breast  Pain just under and deep to the nipple  No masses that she knows  Last mammogram done in April 2021. Was normal.

## 2021-09-10 NOTE — PROGRESS NOTES
Subjective:     CC: Diagnoses of Gastroesophageal reflux disease without esophagitis, Generalized abdominal pain, Rectal pain, Breast pain, right, and Chronic right shoulder pain were pertinent to this visit.    HPI: Patient is a 66 y.o. female established patient who presents today to discuss the following concerns.       Gastroesophageal reflux disease without esophagitis  Chronic problem. Has history of Sue's  Had endoscopy on 8/3/21  Patient states it looked good, we will request records.   Has had weight gain, able to eat more again .  Overall feeling significantly better.     Generalized abdominal pain  Significant improvement over the past few months.   Still careful about what she eats.   Continues to be followed by Dr. Brink.     Rectal pain  Now resolved following hemorrhoidectomy    Right shoulder pain  New problem.   Worst at night.  Has done PT in the past on that shoulder  Pain with reaching over the head or grabbing things from overhead.   This pain has been going on x 1.5 mo  Notes a lot of discomfort in the back.   Had a fall 15 yrs ago which caused dislocation. Did well following PT.    Breast pain, right  Right breast pain for about 4 weeks.   No skin changes. No rashes.  Patient reports a burning pain starts in her armpit and goes into the breast  Sometimes has pain just under and deep to the nipple when she presses on it.   No masses that she knows  Last mammogram done in April 2021. Was normal.         Past Medical History:   Diagnosis Date   • ASTHMA     remote hx   • Bowel habit changes    • Chest discomfort 04/2019    Coronary CT scan with LAD 11.4.    • COPD     mild, not on meds for it   • Hyperlipidemia    • Hypertension    • OSTEOPOROSIS    • Shortness of breath 05/2019    Echocardiogram with normal LV size, LVEF 65%.Trace MR. Mild TR. RVSP 30mmHg.       Social History     Tobacco Use   • Smoking status: Former Smoker     Packs/day: 0.00   • Smokeless tobacco: Never Used   • Tobacco  "comment: 20 y.a   Vaping Use   • Vaping Use: Never used   Substance Use Topics   • Alcohol use: Not Currently     Alcohol/week: 2.4 oz     Types: 4 Glasses of wine per week     Comment: stopped in 2020   • Drug use: Yes     Types: Oral     Comment: 4/17/2021       Current Outpatient Medications Ordered in Epic   Medication Sig Dispense Refill   • Multiple Vitamins-Minerals (HAIR SKIN & NAILS ADVANCED) Tab Take  by mouth.     • sertraline (ZOLOFT) 50 MG Tab TAKE 1 TABLET BY MOUTH EVERY DAY 90 Tablet 3   • raloxifene (EVISTA) 60 MG Tab TAKE 1 TABLET BY MOUTH EVERY DAY 90 tablet 3   • amLODIPine (NORVASC) 2.5 MG Tab TAKE 1 TABLET BY MOUTH EVERY DAY 90 tablet 3   • NITRO-BID 2 % Ointment Place 0.5 Inches on the skin 4 times a day. On bottom     • diclofenac sodium 1 % Gel Apply topically BID to ankle (Patient taking differently: Apply 1 Application topically 2 times a day. Apply topically BID to ankle) 150 g 0   • docusate sodium 100 MG Cap Take 100 mg by mouth 2 Times a Day. 60 capsule 1   • polyethylene glycol/lytes (MIRALAX) 17 g Pack Take 1 Packet by mouth every day. (Patient taking differently: Take 17 g by mouth 2 times a day.) 30 Each 1   • psyllium (METAMUCIL) 58.6 % Pack Take 1 Packet by mouth every day. 54 Each 1   • Turmeric Powder Take 1 Scoop by mouth every day.     • Cholecalciferol (VITAMIN D) 125 MCG (5000 UT) Cap Take 5,000 Units by mouth every day.       No current [facility]-ordered facility-administered medications on file.       Allergies:  Ciprofloxacin, Pcn [penicillins], and Sulfa drugs    Health Maintenance: Completed    ROS:  Pulm: no sob, no cough  CV: no chest pain, no palpitations        Objective:       Exam:  /70 (BP Location: Left arm, Patient Position: Sitting, BP Cuff Size: Adult long)   Pulse 85   Temp 36.4 °C (97.6 °F) (Temporal)   Ht 1.575 m (5' 2\")   Wt 54.9 kg (121 lb)   SpO2 97%   BMI 22.13 kg/m²  Body mass index is 22.13 kg/m².    General: Normal appearing. No " distress.  HEAD: NCAT  EYES: conjunctiva clear, lids without ptosis, pupils equal and reactive to light  EARS: ears normal shape and contour.  MOUTH: normal dentition   Neck:  Normal ROM  Pulmonary: Normal effort. Normal respiratory rate.  Cardiovascular: Well perfused. No LE edema  Breast:  Bilaterally symmetrical, no nipple discharge, no skin changes or dimpling are  noted.  Both breasts are free of palpable pathology and the axillae are free of lymphadenopathy.  Neurologic: Grossly normal, no focal deficits  Skin: Warm and dry.  No obvious lesions.  Musculoskeletal: Normal gait and station.   Psych: Normal mood and affect. Alert and oriented x3. Judgment and insight is normal.       Assessment & Plan:     66 y.o. female with the following -     1. Gastroesophageal reflux disease without esophagitis  Chronic problem. Patient has been having significant improvement.     2. Generalized abdominal pain  Chronic problem. Seems to be resolved as long as she is careful with her diet. Continues to be followed by GI.     3. Rectal pain  Now resolved following hemorrhoidectomy.     4. Breast pain, right  New problem. Normal breast exam. No masses. No rashes. Patient mostly reports a burning sensation in the axilla and inferior to the axilla that radiates around her side. Possibly nerve related from her shoulder? Up to date on mammo, last done 6 months ago and it was normal.   Plan to treat the shoulder pain with PT. If no improvement with PT, plan for further work up.     5. Chronic right shoulder pain  New problem. Patient has been having right shoulder pain and pain in the R scapula. Pain with laying on it as well. Possibly due to h/o labral tear (has h/o dislocation). Referral placed to PT. If no improvement will move forward with further imaging.   - REFERRAL TO PHYSICAL THERAPY      Return in about 2 months (around 11/10/2021) for annual exam and f/u on shoulder/breast pain..    Please note that this dictation was  created using voice recognition software. I have made every reasonable attempt to correct obvious errors, but I expect that there are errors of grammar and possibly content that I did not discover before finalizing the note.

## 2021-09-30 ENCOUNTER — TELEPHONE (OUTPATIENT)
Dept: HEALTH INFORMATION MANAGEMENT | Facility: OTHER | Age: 66
End: 2021-09-30

## 2021-09-30 NOTE — TELEPHONE ENCOUNTER
Outcome: Left Message MARILEE    Please transfer to Patient Outreach Team at [phone] when patient returns call.    [facility] Verified: yes    Attempt # 2

## 2021-11-12 ENCOUNTER — OFFICE VISIT (OUTPATIENT)
Dept: MEDICAL GROUP | Facility: MEDICAL CENTER | Age: 66
End: 2021-11-12
Payer: MEDICARE

## 2021-11-12 VITALS
HEART RATE: 88 BPM | BODY MASS INDEX: 23 KG/M2 | WEIGHT: 125 LBS | OXYGEN SATURATION: 100 % | DIASTOLIC BLOOD PRESSURE: 76 MMHG | SYSTOLIC BLOOD PRESSURE: 130 MMHG | TEMPERATURE: 97.9 F | HEIGHT: 62 IN

## 2021-11-12 DIAGNOSIS — N39.41 URGE INCONTINENCE OF URINE: ICD-10-CM

## 2021-11-12 DIAGNOSIS — E78.5 DYSLIPIDEMIA: ICD-10-CM

## 2021-11-12 DIAGNOSIS — R10.84 GENERALIZED ABDOMINAL PAIN: ICD-10-CM

## 2021-11-12 DIAGNOSIS — I10 ESSENTIAL HYPERTENSION: ICD-10-CM

## 2021-11-12 DIAGNOSIS — K21.9 GASTROESOPHAGEAL REFLUX DISEASE WITHOUT ESOPHAGITIS: ICD-10-CM

## 2021-11-12 DIAGNOSIS — M81.0 AGE-RELATED OSTEOPOROSIS WITHOUT CURRENT PATHOLOGICAL FRACTURE: ICD-10-CM

## 2021-11-12 DIAGNOSIS — F63.3 HAIR PULLING: ICD-10-CM

## 2021-11-12 DIAGNOSIS — K62.3 RECTAL PROLAPSE: ICD-10-CM

## 2021-11-12 DIAGNOSIS — F41.9 ANXIETY: ICD-10-CM

## 2021-11-12 PROBLEM — N64.4 BREAST PAIN, RIGHT: Status: RESOLVED | Noted: 2021-09-10 | Resolved: 2021-11-12

## 2021-11-12 PROBLEM — R53.83 OTHER FATIGUE: Status: RESOLVED | Noted: 2019-03-12 | Resolved: 2021-11-12

## 2021-11-12 PROBLEM — K62.89 RECTAL PAIN: Status: RESOLVED | Noted: 2021-03-09 | Resolved: 2021-11-12

## 2021-11-12 PROBLEM — R07.89 CHEST DISCOMFORT: Status: RESOLVED | Noted: 2019-03-12 | Resolved: 2021-11-12

## 2021-11-12 PROBLEM — R10.9 RIGHT SIDED ABDOMINAL PAIN: Status: RESOLVED | Noted: 2020-03-03 | Resolved: 2021-11-12

## 2021-11-12 PROCEDURE — G0438 PPPS, INITIAL VISIT: HCPCS | Performed by: FAMILY MEDICINE

## 2021-11-12 RX ORDER — CLINDAMYCIN HYDROCHLORIDE 150 MG/1
CAPSULE ORAL
COMMUNITY
Start: 2021-11-05 | End: 2022-05-17

## 2021-11-12 ASSESSMENT — ENCOUNTER SYMPTOMS: GENERAL WELL-BEING: GOOD

## 2021-11-12 ASSESSMENT — PATIENT HEALTH QUESTIONNAIRE - PHQ9: CLINICAL INTERPRETATION OF PHQ2 SCORE: 0

## 2021-11-12 ASSESSMENT — ACTIVITIES OF DAILY LIVING (ADL): BATHING_REQUIRES_ASSISTANCE: 0

## 2021-11-12 ASSESSMENT — FIBROSIS 4 INDEX: FIB4 SCORE: 1.77

## 2021-11-12 NOTE — PROGRESS NOTES
Chief Complaint   Patient presents with   • Annual Wellness Visit   • Shoulder Pain         HPI:  Funmilayo is a 66 y.o. here for Medicare Annual Wellness Visit    Essential hypertension  Chronic problem. Down to 2.5mg of amlodipine. BP well controlled on 130/76.     Age-related osteoporosis without current pathological fracture  Chronic problem. Currently on raloxifene. Has been for the past 5 yrs. Last dexa done 4/2021. Looked good.    Generalized abdominal pain  Mostly resolved at this time. She follows a mostly bland diet. WEight stable. Followed by GI.     Gastroesophageal reflux disease without esophagitis  Chronic problem. Followed by GI. Generally doing quite well. She does vomit if she eats spicy food.     Rectal prolapse vs prolapsed hemorroids   Now resolved following surgery. Doing quite well.     Hair pulling  Chronic problem. zoloft is very helpful. Does not need refill today.     Dyslipidemia  Chronic problem. Mild. Last lipid panel done feb 2021. Not on statin. ASCVD risk 5.1%    Urge incontinence of urine  Chronic problem. Tried PT with improvement, but seems to be getting worse again. Would like to try PT again.   Does not want medication at this time.       Patient Active Problem List    Diagnosis Date Noted   • Gastroesophageal reflux disease without esophagitis 09/10/2021   • Rectal prolapse vs prolapsed hemorroids  03/09/2021   • Generalized abdominal pain 12/21/2020   • Sebaceous cyst 03/03/2020   • Urge incontinence of urine 04/12/2019   • Anxiety 03/12/2019   • Hair pulling 03/12/2019   • Essential hypertension 03/12/2019   • Age-related osteoporosis without current pathological fracture 03/12/2019   • Dyslipidemia 03/12/2019       Current Outpatient Medications   Medication Sig Dispense Refill   • clindamycin (CLEOCIN) 150 MG Cap      • sertraline (ZOLOFT) 50 MG Tab Take 1 Tablet by mouth every day. 90 Tablet 3   • Multiple Vitamins-Minerals (HAIR SKIN & NAILS ADVANCED) Tab Take  by mouth.     •  raloxifene (EVISTA) 60 MG Tab TAKE 1 TABLET BY MOUTH EVERY DAY 90 tablet 3   • amLODIPine (NORVASC) 2.5 MG Tab TAKE 1 TABLET BY MOUTH EVERY DAY 90 tablet 3   • NITRO-BID 2 % Ointment Place 0.5 Inches on the skin 4 times a day. On bottom     • diclofenac sodium 1 % Gel Apply topically BID to ankle (Patient taking differently: Apply 1 Application topically 2 times a day. Apply topically BID to ankle) 150 g 0   • docusate sodium 100 MG Cap Take 100 mg by mouth 2 Times a Day. 60 capsule 1   • polyethylene glycol/lytes (MIRALAX) 17 g Pack Take 1 Packet by mouth every day. (Patient taking differently: Take 17 g by mouth 2 times a day.) 30 Each 1   • Cholecalciferol (VITAMIN D) 125 MCG (5000 UT) Cap Take 5,000 Units by mouth every day.     • Turmeric Powder Take 1 Scoop by mouth every day.       No current facility-administered medications for this visit.        Patient is taking medications as noted in medication list.  Current supplements as per medication list.     Allergies: Ciprofloxacin, Pcn [penicillins], and Sulfa drugs    Current social contact/activities: Family/friends    Is patient current with immunizations? Yes.    She  reports that she has quit smoking. She smoked 0.00 packs per day. She has never used smokeless tobacco. She reports previous alcohol use of about 2.4 oz of alcohol per week. She reports current drug use. Drug: Oral.  Counseling given: Not Answered  Comment: 20 y.a        DPA/Advanced directive: Patient has Advanced Directive, but it is not on file. Instructed to bring in a copy to scan into their chart.    ROS:    Gait: Uses no assistive device   Ostomy: No   Other tubes: No   Amputations: No   Chronic oxygen use No   Last eye exam 2020  Wears hearing aids: No   : Reports urinary leakage during the last 6 months that has somewhat interfered with their daily activities or sleep.      Screening:    DEPRESSION     Is patient seeing a counselor, psychiatrist or other healthcare provider  regarding their mental health? No, and not interested.     Depression Screen (PHQ-2/PHQ-9) 1/25/2021 3/9/2021 11/12/2021   PHQ-2 Total Score - 0 -   PHQ-2 Total Score 0 - 0       Interpretation of PHQ-9 Total Score   Score Severity   1-4 No Depression   5-9 Mild Depression   10-14 Moderate Depression   15-19 Moderately Severe Depression   20-27 Severe Depression    Depression Screening    Little interest or pleasure in doing things?  0 - not at all  Feeling down, depressed, or hopeless? 0 - not at all  Patient Health Questionnaire Score: 0    If depressive symptoms identified deferred to follow up visit unless specifically addressed in assessment and plan.    Interpretation of PHQ-9 Total Score   Score Severity   1-4 No Depression   5-9 Mild Depression   10-14 Moderate Depression   15-19 Moderately Severe Depression   20-27 Severe Depression    Screening for Cognitive Impairment    Three Minute Recall (captain, garden, picture)  2/3    Ricky clock face with all 12 numbers and set the hands to show 5 past 8.  Yes    If cognitive concerns identified, deferred for follow up unless specifically addressed in assessment and plan.    Fall Risk Assessment    Has the patient had two or more falls in the last year or any fall with injury in the last year?  No  If fall risk identified, deferred for follow up unless specifically addressed in assessment and plan.    Safety Assessment    Throw rugs on floor.  Yes  Handrails on all stairs.  No  Good lighting in all hallways.  Yes  Difficulty hearing.  No  Patient counseled about all safety risks that were identified.    Functional Assessment ADLs    Are there any barriers preventing you from cooking for yourself or meeting nutritional needs?  No.    Are there any barriers preventing you from driving safely or obtaining transportation?  No.    Are there any barriers preventing you from using a telephone or calling for help?  No.    Are there any barriers preventing you from  shopping?  No.    Are there any barriers preventing you from taking care of your own finances?  No.    Are there any barriers preventing you from managing your medications?  No.    Are there any barriers preventing you from showering, bathing or dressing yourself?  No.    Are you currently engaging in any exercise or physical activity?  Yes.  PT  What is your perception of your health?  Good.    Health Maintenance Summary          Overdue - Annual Wellness Visit (Once) Overdue - never done    No completion history exists for this topic.          Overdue - COVID-19 Vaccine (3 - Booster for Moderna series) Overdue since 10/1/2021    04/01/2021  Imm Admin: Moderna SARS-CoV-2 Vaccine    03/05/2021  Imm Admin: Moderna SARS-CoV-2 Vaccine          MAMMOGRAM (Yearly) Next due on 4/1/2022 04/01/2021  MA-SCREENING MAMMO BILAT W/TOMOSYNTHESIS W/CAD    04/15/2019  MA-SCREENING MAMMO BILAT W/TOMOSYNTHESIS W/CAD    12/04/2009  MA-SCREENING DIGITAL MAMMO    12/04/2009  MA-CAD SCREENING-MAMMO    04/20/2007  MA-UNILATERAL DIAGNOSTIC (CB)-MG    Only the first 5 history entries have been loaded, but more history exists.          IMM PNEUMOCOCCAL VACCINE: 65+ Years (1 of 1 - PPSV23) Next due on 7/23/2023 07/23/2018  Imm Admin: Pneumococcal polysaccharide vaccine (PPSV-23)          PAP SMEAR (Every 5 Years) Next due on 4/26/2024 04/26/2019  THINPREP PAP WITH HPV    04/26/2019  Pathology Gynecology Specimen          BONE DENSITY (Every 5 Years) Next due on 4/1/2026 04/01/2021  DS-BONE DENSITY STUDY (DEXA)    12/04/2009  DS-BONE DENSITY STUDY (DEXA)    03/02/2007  DS-BONE DENSITY STUDY (DEXA)          IMM DTaP/Tdap/Td Vaccine (2 - Td or Tdap) Next due on 8/21/2026 08/21/2016  Imm Admin: Tdap Vaccine    12/08/2006  Imm Admin: TD Vaccine          COLORECTAL CANCER SCREENING (COLONOSCOPY - Preferred) Next due on 6/26/2028 06/26/2018  REFERRAL TO GI FOR COLONOSCOPY          IMM HEP B VACCINE (Series Information) Aged Out     12/08/2006  Imm Admin: Hep A/HEP B Combined Vaccine (TwinRix)          IMM ZOSTER VACCINES (Series Information) Completed    04/10/2019  Imm Admin: Zoster Vaccine Recombinant (RZV) (SHINGRIX)    07/23/2018  Imm Admin: Zoster Vaccine Recombinant (RZV) (SHINGRIX)    07/22/2017  Imm Admin: Zoster Vaccine Live (ZVL) (Zostavax) - HISTORICAL DATA          HEPATITIS C SCREENING  Completed    07/09/2020  HEP C VIRUS ANTIBODY    05/10/2019  HEP C VIRUS ANTIBODY          IMM INFLUENZA (Series Information) Completed    10/15/2021  Imm Admin: Influenza, Unspecified - HISTORICAL DATA    09/15/2020  Imm Admin: Influenza Vaccine Adult HD    10/16/2019  Imm Admin: Influenza Vac Subunit Quad Inj (Pf)    10/20/2018  Imm Admin: Influenza Vac Subunit Quad Inj (Pf)    09/25/2017  Imm Admin: Influenza Vaccine Quad Inj (Pf)    Only the first 5 history entries have been loaded, but more history exists.          IMM MENINGOCOCCAL VACCINE (MCV4) (Series Information) Aged Out    No completion history exists for this topic.                Patient Care Team:  Marija Baker M.D. as PCP - General (Family Medicine)  Nate Luther Ass't as [facility]     Social History     Tobacco Use   • Smoking status: Former Smoker     Packs/day: 0.00   • Smokeless tobacco: Never Used   • Tobacco comment: 20 y.a   Vaping Use   • Vaping Use: Never used   Substance Use Topics   • Alcohol use: Not Currently     Alcohol/week: 2.4 oz     Types: 4 Glasses of wine per week     Comment: stopped in 2020   • Drug use: Yes     Types: Oral     Comment: 4/17/2021     Family History   Problem Relation Age of Onset   • Heart Disease Mother 65   • Cancer Father    • Heart Disease Brother      She  has a past medical history of ASTHMA, Bowel habit changes, Chest discomfort (04/2019), COPD, Hyperlipidemia, Hypertension, OSTEOPOROSIS, and Shortness of breath (05/2019).   Past Surgical History:   Procedure Laterality Date   • HEMORRHOIDECTOMY  "N/A 4/19/2021    Procedure: HEMORRHOIDECTOMY - INTERNAL AND EXTERNAL, COMPLEX OR EXTENSIVE.;  Surgeon: Estela Kenney M.D.;  Location: SURGERY [facility];  Service: General   • OTHER  2020    Esophagus   • ABDOMINAL HYSTERECTOMY TOTAL     • APPENDECTOMY     • HEMORRHOIDECTOMY             Exam:     /76 (BP Location: Right arm, Patient Position: Sitting, BP Cuff Size: Adult long)   Pulse 88   Temp 36.6 °C (97.9 °F) (Temporal)   Ht 1.575 m (5' 2\")   Wt 56.7 kg (125 lb)   SpO2 100%  Body mass index is 22.86 kg/m².    Hearing good.    Dentition good  Alert, oriented in no acute distress.  Eye contact is good, speech goal directed, affect calm      Assessment and Plan. The following treatment and monitoring plan is recommended:    1. Essential hypertension  CBC WITH DIFFERENTIAL    TSH WITH REFLEX TO FT4   2. Age-related osteoporosis without current pathological fracture  TSH WITH REFLEX TO FT4   3. Generalized abdominal pain  Comp Metabolic Panel   4. Gastroesophageal reflux disease without esophagitis  Comp Metabolic Panel   5. Rectal prolapse vs prolapsed hemorroids      6. Anxiety  sertraline (ZOLOFT) 50 MG Tab    CBC WITH DIFFERENTIAL   7. Hair pulling  sertraline (ZOLOFT) 50 MG Tab   8. Dyslipidemia  Lipid Profile   9. Urge incontinence of urine  Referral to Physical Therapy     Patient is overall doing well. Stomach issues have essentially resolved at this point. Weight remains stable. Up to date on all cancer cancer screening and bone density testing. She has had both covid vaccines (moderna).   She did have some issue with remembering the 3 words and initially had a little confusion drawing her clock but did end of drawing it properly. She does not some worsening memory and we will plan to f/u for more in dept memory testing.     Services suggested: No services needed at this time  Health Care Screening recommendations as per orders if indicated.  Referrals offered: PT/OT/Nutrition " counseling/Behavioral Health/Smoking cessation as per orders if indicated.    Discussion today about general wellness and lifestyle habits:    · Prevent falls and reduce trip hazards; Cautioned about securing or removing rugs.  · Have a working fire alarm and carbon monoxide detector;   · Engage in regular physical activity and social activities       Follow-up: No follow-ups on file.

## 2022-01-18 ENCOUNTER — PATIENT MESSAGE (OUTPATIENT)
Dept: HEALTH INFORMATION MANAGEMENT | Facility: OTHER | Age: 67
End: 2022-01-18
Payer: MEDICARE

## 2022-04-01 RX ORDER — AMLODIPINE BESYLATE 2.5 MG/1
2.5 TABLET ORAL DAILY
Qty: 90 TABLET | Refills: 3 | Status: SHIPPED | OUTPATIENT
Start: 2022-04-01 | End: 2023-05-25 | Stop reason: SDUPTHER

## 2022-04-18 ENCOUNTER — TELEPHONE (OUTPATIENT)
Dept: HEALTH INFORMATION MANAGEMENT | Facility: OTHER | Age: 67
End: 2022-04-18
Payer: MEDICARE

## 2022-05-17 ENCOUNTER — OFFICE VISIT (OUTPATIENT)
Dept: MEDICAL GROUP | Facility: MEDICAL CENTER | Age: 67
End: 2022-05-17
Payer: MEDICARE

## 2022-05-17 VITALS
TEMPERATURE: 97.1 F | HEIGHT: 62 IN | DIASTOLIC BLOOD PRESSURE: 76 MMHG | WEIGHT: 130 LBS | HEART RATE: 86 BPM | SYSTOLIC BLOOD PRESSURE: 134 MMHG | BODY MASS INDEX: 23.92 KG/M2 | OXYGEN SATURATION: 96 %

## 2022-05-17 DIAGNOSIS — R23.8 SCALP IRRITATION: ICD-10-CM

## 2022-05-17 DIAGNOSIS — M79.671 BILATERAL FOOT PAIN: ICD-10-CM

## 2022-05-17 DIAGNOSIS — M79.672 BILATERAL FOOT PAIN: ICD-10-CM

## 2022-05-17 DIAGNOSIS — N39.41 URGE INCONTINENCE OF URINE: ICD-10-CM

## 2022-05-17 DIAGNOSIS — I10 ESSENTIAL HYPERTENSION: ICD-10-CM

## 2022-05-17 DIAGNOSIS — F63.3 HAIR PULLING: ICD-10-CM

## 2022-05-17 DIAGNOSIS — M81.0 AGE-RELATED OSTEOPOROSIS WITHOUT CURRENT PATHOLOGICAL FRACTURE: ICD-10-CM

## 2022-05-17 DIAGNOSIS — Z12.31 ENCOUNTER FOR SCREENING MAMMOGRAM FOR MALIGNANT NEOPLASM OF BREAST: ICD-10-CM

## 2022-05-17 PROBLEM — L72.3 SEBACEOUS CYST: Status: RESOLVED | Noted: 2020-03-03 | Resolved: 2022-05-17

## 2022-05-17 PROBLEM — F41.9 ANXIETY: Status: RESOLVED | Noted: 2019-03-12 | Resolved: 2022-05-17

## 2022-05-17 PROBLEM — K62.3 RECTAL PROLAPSE: Status: RESOLVED | Noted: 2021-03-09 | Resolved: 2022-05-17

## 2022-05-17 PROCEDURE — 99214 OFFICE O/P EST MOD 30 MIN: CPT | Performed by: FAMILY MEDICINE

## 2022-05-17 ASSESSMENT — PATIENT HEALTH QUESTIONNAIRE - PHQ9: CLINICAL INTERPRETATION OF PHQ2 SCORE: 0

## 2022-05-17 ASSESSMENT — FIBROSIS 4 INDEX: FIB4 SCORE: 1.77

## 2022-05-17 NOTE — PROGRESS NOTES
Subjective:     CC: Diagnoses of Bilateral foot pain, Essential hypertension, Hair pulling, Age-related osteoporosis without current pathological fracture, Scalp irritation, Encounter for screening mammogram for malignant neoplasm of breast, and Urge incontinence of urine were pertinent to this visit.    HPI: Patient is a 66 y.o. female established patient who presents today to discuss the following.       Bilateral foot pain  The patient reports burning and pain in the arch of her foot. She was doing PT for this previously and noted significant improvement.   Reports it doesn't bother her at rest much but does having pain after walking. Become more and more painful with more walking.   She did PT back in Sept-Nov of 2021.      Essential hypertension  Chronic problem. Generally well controlled. Taking amlodpine 2.5mg.     Hair pulling  Chronic problem. Moderately well controlled with zoloft.     Age-related osteoporosis without current pathological fracture  Chronic problem. On raloxifene, has been on this for 5.5 yrs.     Scalp irritation  New problem.   X 1 week  Left lower occipital area  Hydrocortisone helping  Red, flaky    Urge incontinence of urine  Chronic problem. Present for years. Did improve with PT in the past but now has gone without PT for a couple years and worse again.       Past Medical History:   Diagnosis Date   • ASTHMA     remote hx   • Bowel habit changes    • Chest discomfort 04/2019    Coronary CT scan with LAD 11.4.    • COPD     mild, not on meds for it   • Hyperlipidemia    • Hypertension    • OSTEOPOROSIS    • Shortness of breath 05/2019    Echocardiogram with normal LV size, LVEF 65%.Trace MR. Mild TR. RVSP 30mmHg.       Social History     Tobacco Use   • Smoking status: Former Smoker     Packs/day: 0.00   • Smokeless tobacco: Never Used   • Tobacco comment: 20 y.a   Vaping Use   • Vaping Use: Never used   Substance Use Topics   • Alcohol use: Not Currently     Alcohol/week: 2.4 oz      "Types: 4 Glasses of wine per week     Comment: stopped in 2020   • Drug use: Yes     Types: Oral     Comment: 4/17/2021       Current Outpatient Medications Ordered in Epic   Medication Sig Dispense Refill   • amLODIPine (NORVASC) 2.5 MG Tab Take 1 Tablet by mouth every day. 90 Tablet 3   • sertraline (ZOLOFT) 50 MG Tab Take 1 Tablet by mouth every day. 90 Tablet 3   • raloxifene (EVISTA) 60 MG Tab TAKE 1 TABLET BY MOUTH EVERY DAY 90 tablet 3   • NITRO-BID 2 % Ointment Place 0.5 Inches on the skin 4 times a day. On bottom     • docusate sodium 100 MG Cap Take 100 mg by mouth 2 Times a Day. 60 capsule 1   • polyethylene glycol/lytes (MIRALAX) 17 g Pack Take 1 Packet by mouth every day. (Patient taking differently: Take 17 g by mouth 2 times a day.) 30 Each 1   • Turmeric Powder Take 1 Scoop by mouth every day.     • Cholecalciferol (VITAMIN D) 125 MCG (5000 UT) Cap Take 5,000 Units by mouth every day.       No current [facility]-ordered facility-administered medications on file.       Allergies:  Ciprofloxacin, Pcn [penicillins], and Sulfa drugs    Health Maintenance: Completed      Objective:       Exam:  /76 (BP Location: Left arm, Patient Position: Sitting, BP Cuff Size: Adult long)   Pulse 86   Temp 36.2 °C (97.1 °F) (Temporal)   Ht 1.575 m (5' 2\")   Wt 59 kg (130 lb)   SpO2 96%   BMI 23.78 kg/m²  Body mass index is 23.78 kg/m².    General: Normal appearing. No distress.  HEAD: NCAT  EYES: conjunctiva clear, lids without ptosis, pupils equal and reactive to light  EARS: ears normal shape and contour.  MOUTH: normal dentition   Neck:  Normal ROM  Pulmonary: Normal effort. Normal respiratory rate.  Cardiovascular: Well perfused. No LE edema  Neurologic: Grossly normal, no focal deficits  Skin: Warm and dry. Rash on scalp, erythematous, flaky, left lower occipital region.   Musculoskeletal: Normal gait and station.   Psych: Normal mood and affect. Alert and oriented x3. Judgment and insight is normal. "     Labs: 3/9/21 Results reviewed and discussed with the patient, questions answered.    Assessment & Plan:     66 y.o. female with the following -     1. Bilateral foot pain  The patient reports that she has struggled with the pain for years.  She was started to have significant improvement with physical therapy but stopped back in November.  Requesting new referral today.  Possibly secondary to plantar fasciitis?  New referral placed.  - Referral to Physical Therapy    2. Essential hypertension  Chronic problem, moderately well-controlled with amlodipine 2.5 mg.    3. Hair pulling  Chronic problem, moderately well-controlled with sertraline.    4. Age-related osteoporosis without current pathological fracture  Chronic problem, on Evista.    5. Scalp irritation  New problem.  Exam consistent with skin irritation/eczema?  Advised okay to continue using hydrocortisone.    6. Encounter for screening mammogram for malignant neoplasm of breast  - MA-SCREENING MAMMO BILAT W/CAD; Future    7. Urge incontinence of urine  Chronic problem, again, patient was having improvement with pelvic floor PT, new referral placed.  Declines medication at this time.  Present now for about 3 years.  - Referral to Physical Therapy    Recommended patient get labs done prior to next visit.  Will  in November    Return in about 6 months (around 2022).    Please note that this dictation was created using voice recognition software. I have made every reasonable attempt to correct obvious errors, but I expect that there are errors of grammar and possibly content that I did not discover before finalizing the note.

## 2022-05-17 NOTE — ASSESSMENT & PLAN NOTE
The patient reports burning and pain in the arch of her foot. She was doing PT for this previously and noted significant improvement.   Reports it doesn't bother her at rest much but does having pain after walking. Become more and more painful with more walking.   She did PT back in Sept-Nov of 2021.

## 2022-05-17 NOTE — ASSESSMENT & PLAN NOTE
Chronic problem. Present for years. Did improve with PT in the past but now has gone without PT for a couple years and worse again.

## 2022-05-23 ENCOUNTER — HOSPITAL ENCOUNTER (OUTPATIENT)
Dept: RADIOLOGY | Facility: MEDICAL CENTER | Age: 67
End: 2022-05-23
Attending: FAMILY MEDICINE
Payer: MEDICARE

## 2022-05-23 DIAGNOSIS — Z12.31 ENCOUNTER FOR SCREENING MAMMOGRAM FOR MALIGNANT NEOPLASM OF BREAST: ICD-10-CM

## 2022-05-23 PROCEDURE — 77063 BREAST TOMOSYNTHESIS BI: CPT

## 2022-05-31 ENCOUNTER — HOSPITAL ENCOUNTER (OUTPATIENT)
Dept: LAB | Facility: MEDICAL CENTER | Age: 67
End: 2022-05-31
Attending: FAMILY MEDICINE
Payer: MEDICARE

## 2022-05-31 DIAGNOSIS — E78.5 DYSLIPIDEMIA: ICD-10-CM

## 2022-05-31 DIAGNOSIS — F41.9 ANXIETY: ICD-10-CM

## 2022-05-31 DIAGNOSIS — K21.9 GASTROESOPHAGEAL REFLUX DISEASE WITHOUT ESOPHAGITIS: ICD-10-CM

## 2022-05-31 DIAGNOSIS — I10 ESSENTIAL HYPERTENSION: ICD-10-CM

## 2022-05-31 DIAGNOSIS — M81.0 AGE-RELATED OSTEOPOROSIS WITHOUT CURRENT PATHOLOGICAL FRACTURE: ICD-10-CM

## 2022-05-31 DIAGNOSIS — R10.84 GENERALIZED ABDOMINAL PAIN: ICD-10-CM

## 2022-05-31 LAB
ALBUMIN SERPL BCP-MCNC: 4.6 G/DL (ref 3.2–4.9)
ALBUMIN/GLOB SERPL: 1.9 G/DL
ALP SERPL-CCNC: 55 U/L (ref 30–99)
ALT SERPL-CCNC: 16 U/L (ref 2–50)
ANION GAP SERPL CALC-SCNC: 8 MMOL/L (ref 7–16)
AST SERPL-CCNC: 18 U/L (ref 12–45)
BASOPHILS # BLD AUTO: 0.2 % (ref 0–1.8)
BASOPHILS # BLD: 0.01 K/UL (ref 0–0.12)
BILIRUB SERPL-MCNC: 0.4 MG/DL (ref 0.1–1.5)
BUN SERPL-MCNC: 16 MG/DL (ref 8–22)
CALCIUM SERPL-MCNC: 9.4 MG/DL (ref 8.4–10.2)
CHLORIDE SERPL-SCNC: 102 MMOL/L (ref 96–112)
CHOLEST SERPL-MCNC: 228 MG/DL (ref 100–199)
CO2 SERPL-SCNC: 30 MMOL/L (ref 20–33)
CREAT SERPL-MCNC: 0.69 MG/DL (ref 0.5–1.4)
EOSINOPHIL # BLD AUTO: 0.19 K/UL (ref 0–0.51)
EOSINOPHIL NFR BLD: 4.2 % (ref 0–6.9)
ERYTHROCYTE [DISTWIDTH] IN BLOOD BY AUTOMATED COUNT: 45.6 FL (ref 35.9–50)
GFR SERPLBLD CREATININE-BSD FMLA CKD-EPI: 95 ML/MIN/1.73 M 2
GLOBULIN SER CALC-MCNC: 2.4 G/DL (ref 1.9–3.5)
GLUCOSE SERPL-MCNC: 102 MG/DL (ref 65–99)
HCT VFR BLD AUTO: 45.5 % (ref 37–47)
HDLC SERPL-MCNC: 76 MG/DL
HGB BLD-MCNC: 15.4 G/DL (ref 12–16)
IMM GRANULOCYTES # BLD AUTO: 0.01 K/UL (ref 0–0.11)
IMM GRANULOCYTES NFR BLD AUTO: 0.2 % (ref 0–0.9)
LDLC SERPL CALC-MCNC: 127 MG/DL
LYMPHOCYTES # BLD AUTO: 1.51 K/UL (ref 1–4.8)
LYMPHOCYTES NFR BLD: 33 % (ref 22–41)
MCH RBC QN AUTO: 31.2 PG (ref 27–33)
MCHC RBC AUTO-ENTMCNC: 33.8 G/DL (ref 33.6–35)
MCV RBC AUTO: 92.3 FL (ref 81.4–97.8)
MONOCYTES # BLD AUTO: 0.36 K/UL (ref 0–0.85)
MONOCYTES NFR BLD AUTO: 7.9 % (ref 0–13.4)
NEUTROPHILS # BLD AUTO: 2.49 K/UL (ref 2–7.15)
NEUTROPHILS NFR BLD: 54.5 % (ref 44–72)
NRBC # BLD AUTO: 0 K/UL
NRBC BLD-RTO: 0 /100 WBC
PLATELET # BLD AUTO: 211 K/UL (ref 164–446)
PMV BLD AUTO: 9.8 FL (ref 9–12.9)
POTASSIUM SERPL-SCNC: 4.5 MMOL/L (ref 3.6–5.5)
PROT SERPL-MCNC: 7 G/DL (ref 6–8.2)
RBC # BLD AUTO: 4.93 M/UL (ref 4.2–5.4)
SODIUM SERPL-SCNC: 140 MMOL/L (ref 135–145)
TRIGL SERPL-MCNC: 123 MG/DL (ref 0–149)
TSH SERPL DL<=0.005 MIU/L-ACNC: 1.03 UIU/ML (ref 0.38–5.33)
WBC # BLD AUTO: 4.6 K/UL (ref 4.8–10.8)

## 2022-05-31 PROCEDURE — 36415 COLL VENOUS BLD VENIPUNCTURE: CPT

## 2022-05-31 PROCEDURE — 85025 COMPLETE CBC W/AUTO DIFF WBC: CPT

## 2022-05-31 PROCEDURE — 84443 ASSAY THYROID STIM HORMONE: CPT

## 2022-05-31 PROCEDURE — 80053 COMPREHEN METABOLIC PANEL: CPT

## 2022-05-31 PROCEDURE — 80061 LIPID PANEL: CPT

## 2022-07-30 DIAGNOSIS — M81.0 AGE-RELATED OSTEOPOROSIS WITHOUT CURRENT PATHOLOGICAL FRACTURE: ICD-10-CM

## 2022-08-01 RX ORDER — RALOXIFENE HYDROCHLORIDE 60 MG/1
TABLET, FILM COATED ORAL
Qty: 90 TABLET | Refills: 3 | Status: SHIPPED | OUTPATIENT
Start: 2022-08-01 | End: 2023-07-06 | Stop reason: SDUPTHER

## 2022-08-01 NOTE — TELEPHONE ENCOUNTER
Received request via: Pharmacy    Was the patient seen in the last year in this department? Yes    Does the patient have an active prescription (recently filled or refills available) for medication(s) requested? No     Future Appointments       Provider Department Center    11/15/2022 11:00 AM (Arrive by 10:45 AM) Marija Baker M.D. [facility] [place] [facility]

## 2022-09-28 ENCOUNTER — OFFICE VISIT (OUTPATIENT)
Dept: MEDICAL GROUP | Facility: IMAGING CENTER | Age: 67
End: 2022-09-28
Payer: MEDICARE

## 2022-09-28 VITALS
WEIGHT: 129.8 LBS | OXYGEN SATURATION: 99 % | HEART RATE: 83 BPM | TEMPERATURE: 97.5 F | SYSTOLIC BLOOD PRESSURE: 140 MMHG | HEIGHT: 62 IN | DIASTOLIC BLOOD PRESSURE: 82 MMHG | RESPIRATION RATE: 17 BRPM | BODY MASS INDEX: 23.89 KG/M2

## 2022-09-28 DIAGNOSIS — N39.41 URGE INCONTINENCE: ICD-10-CM

## 2022-09-28 DIAGNOSIS — M79.605 LOW BACK PAIN RADIATING TO LEFT LOWER EXTREMITY: ICD-10-CM

## 2022-09-28 DIAGNOSIS — M54.50 LOW BACK PAIN RADIATING TO LEFT LOWER EXTREMITY: ICD-10-CM

## 2022-09-28 DIAGNOSIS — R33.9 INCOMPLETE BLADDER EMPTYING: ICD-10-CM

## 2022-09-28 DIAGNOSIS — R41.3 MEMORY PROBLEM: ICD-10-CM

## 2022-09-28 LAB
APPEARANCE UR: NORMAL
BILIRUB UR STRIP-MCNC: NEGATIVE MG/DL
COLOR UR AUTO: NORMAL
GLUCOSE UR STRIP.AUTO-MCNC: NEGATIVE MG/DL
KETONES UR STRIP.AUTO-MCNC: NEGATIVE MG/DL
LEUKOCYTE ESTERASE UR QL STRIP.AUTO: NEGATIVE
NITRITE UR QL STRIP.AUTO: NEGATIVE
PH UR STRIP.AUTO: 6.5 [PH] (ref 5–8)
PROT UR QL STRIP: NEGATIVE MG/DL
RBC UR QL AUTO: NEGATIVE
SP GR UR STRIP.AUTO: 1.02
UROBILINOGEN UR STRIP-MCNC: 0.2 MG/DL

## 2022-09-28 PROCEDURE — 81002 URINALYSIS NONAUTO W/O SCOPE: CPT | Performed by: FAMILY MEDICINE

## 2022-09-28 PROCEDURE — 99214 OFFICE O/P EST MOD 30 MIN: CPT | Performed by: FAMILY MEDICINE

## 2022-09-28 ASSESSMENT — FIBROSIS 4 INDEX: FIB4 SCORE: 1.43

## 2022-09-28 ASSESSMENT — PAIN SCALES - GENERAL: PAINLEVEL: NO PAIN

## 2022-09-28 NOTE — PROGRESS NOTES
"  SUBJECTIVE:    Chief Complaint   Patient presents with    Enuresis     Started 3 years ago. She tried PT and it worked for a little while.     Other     Concerns about her memory \"(brain)\"    Back Pain     Pt states she has \"locking pain.\" Started 3 months ago.        HPI:     Funmilayo Egan is a 67 y.o. female here for complaint of urination issues, memory and back pain.   Patient is new to me.     States she cannot hold her urine if she drinks fluids. 3 years ago had similar symptoms, but not as bad- did pelvic floor therapy- helped.   Has one sign she needs to urinate, then has issues. Wearing pad. Likely with incomplete emptying.feels heavy when she has to go. Water intake in morning.   Has seen gynecology in past. No dysuria or hematuria.   Desires to hold off on medication therapy.     Feels has been having memory issues for past year. Sometimes what people have said. States she had let  take her coffee, then she was looking for coffee later.   Sister has dementia- took care of her last 6 months which was difficult, she is now [place].   Close to alzheimer's type dementia. Sister is 5 years older than her.   States she was stress due to sister.     Has had lumbar issues as well-started 3 months ago. Left side buttock pain down left.   No recent injury. No bowel incontinence. No numbness/tingling or weakness.     ROS:  No recent fevers or chills. No nausea or vomiting. No diarrhea. No chest pains or shortness of breath. No lower extremity edema.    Current Outpatient Medications on File Prior to Visit   Medication Sig Dispense Refill    raloxifene (EVISTA) 60 MG Tab TAKE 1 TABLET BY MOUTH EVERY DAY 90 Tablet 3    amLODIPine (NORVASC) 2.5 MG Tab Take 1 Tablet by mouth every day. 90 Tablet 3    sertraline (ZOLOFT) 50 MG Tab Take 1 Tablet by mouth every day. 90 Tablet 3    NITRO-BID 2 % Ointment Place 0.5 Inches on the skin 4 times a day. On bottom      docusate sodium 100 MG Cap Take 100 mg by mouth 2 " "Times a Day. 60 capsule 1    polyethylene glycol/lytes (MIRALAX) 17 g Pack Take 1 Packet by mouth every day. (Patient taking differently: Take 17 g by mouth 2 times a day.) 30 Each 1    Turmeric Powder Take 1 Scoop by mouth every day.       No current facility-administered medications on file prior to visit.       Allergies   Allergen Reactions    Ciprofloxacin      Pt reports that it took out her achilias tendon    Pcn [Penicillins] Itching     Blisters    Sulfa Drugs Rash     7/15/19 welting urticaria/rash after taking Bactrim        Patient Active Problem List    Diagnosis Date Noted    Bilateral foot pain 05/17/2022    Scalp irritation 05/17/2022    Gastroesophageal reflux disease without esophagitis 09/10/2021    Generalized abdominal pain 12/21/2020    Urge incontinence of urine 04/12/2019    Hair pulling 03/12/2019    Essential hypertension 03/12/2019    Age-related osteoporosis without current pathological fracture 03/12/2019    Dyslipidemia 03/12/2019       Past Medical History:   Diagnosis Date    ASTHMA     remote hx    Bowel habit changes     Chest discomfort 04/2019    Coronary CT scan with LAD 11.4.     COPD     mild, not on meds for it    Hyperlipidemia     Hypertension     OSTEOPOROSIS     Shortness of breath 05/2019    Echocardiogram with normal LV size, LVEF 65%.Trace MR. Mild TR. RVSP 30mmHg.         OBJECTIVE:   BP (!) 140/82 (BP Location: Left arm, Patient Position: Sitting, BP Cuff Size: Adult)   Pulse 83   Temp 36.4 °C (97.5 °F) (Temporal)   Resp 17   Ht 1.575 m (5' 2\")   Wt 58.9 kg (129 lb 12.8 oz)   SpO2 99%   BMI 23.74 kg/m²   General: Well-developed well-nourished female, no acute distress  HEENT: oropharynx clear, eyes clear, TMs clear and intact, perrl, eomi, cn 2-12 intact bilaterally  Neck: supple, no lymphadenopathy- cervical or supraclavicular, no thyromegaly  Cardiovascular: regular rate and rhythm, no murmurs, gallops, rubs  Lungs: clear to auscultation bilaterally, no " wheezes, crackles, or rhonchi  Abdomen: +bowel sounds, soft, nontender, nondistended, no rebound, no guarding, no hepatosplenomegaly  Extremities: no cyanosis, clubbing, edema. 2+ DTR throughout, normal strength throughout.   Back: no spinal TTP  Skin: Warm and dry  Psych: appropriate mood and affect    Urine poc: negative.     ASSESSMENT/PLAN:    67 y.o.female     1. Urge incontinence-hx of issues.   Recommend kegel exercises. Assess PVR. Monitor.   POCT Urinalysis      2. Incomplete bladder emptying -as above.  US-RENAL      3. Memory problem - family history of dementia. Notes symptoms past year.   Assess brain MRI. Monitor.  MR-BRAIN-WITH & W/O      4. Low back pain radiating to left lower extremity - assess further with imaging.  MR-LUMBAR SPINE-W/O      Patient declined medication therapy.     Return in about 3 weeks (around 10/19/2022).    This medical record contains text that has been entered with the assistance of computer voice recognition and dictation software.  Therefore, it may contain unintended errors in text, spelling, punctuation, or grammar.

## 2022-10-10 NOTE — ASSESSMENT & PLAN NOTE
Chronic problem. Currently on raloxifene. Has been for the past 5 yrs.   
Chronic problem. Down to 2.5mg of amlodipine. BP well controlled on 130/76.   
Chronic problem. Tried PT with improvement, but seems to be getting worse again. Would like to try PT again.   Does not want medication at this time.   
Chronic problem. mild  
Chronic problem. zoloft is very helpful. Does not need refill today.   
Mostly resolved at this time. She follows a mostly bland diet. WEight stable. Followed by GI.   
Now resolved following surgery. Doing quite well.   
Vomits if she eats spicy food.   
[Negative] : Allergic/Immunologic

## 2022-11-09 ENCOUNTER — DOCUMENTATION (OUTPATIENT)
Dept: HEALTH INFORMATION MANAGEMENT | Facility: OTHER | Age: 67
End: 2022-11-09
Payer: MEDICARE

## 2022-11-16 ENCOUNTER — HOSPITAL ENCOUNTER (OUTPATIENT)
Dept: RADIOLOGY | Facility: MEDICAL CENTER | Age: 67
End: 2022-11-16
Attending: FAMILY MEDICINE
Payer: MEDICARE

## 2022-11-16 DIAGNOSIS — M79.605 LOW BACK PAIN RADIATING TO LEFT LOWER EXTREMITY: ICD-10-CM

## 2022-11-16 DIAGNOSIS — M54.50 LOW BACK PAIN RADIATING TO LEFT LOWER EXTREMITY: ICD-10-CM

## 2022-11-16 DIAGNOSIS — R41.3 MEMORY PROBLEM: ICD-10-CM

## 2022-11-16 DIAGNOSIS — R33.9 INCOMPLETE BLADDER EMPTYING: ICD-10-CM

## 2022-11-16 PROCEDURE — 70553 MRI BRAIN STEM W/O & W/DYE: CPT

## 2022-11-16 PROCEDURE — A9576 INJ PROHANCE MULTIPACK: HCPCS | Performed by: FAMILY MEDICINE

## 2022-11-16 PROCEDURE — 700117 HCHG RX CONTRAST REV CODE 255: Performed by: FAMILY MEDICINE

## 2022-11-16 PROCEDURE — 72148 MRI LUMBAR SPINE W/O DYE: CPT

## 2022-11-16 PROCEDURE — 76775 US EXAM ABDO BACK WALL LIM: CPT

## 2022-11-16 RX ADMIN — GADOTERIDOL 13 ML: 279.3 INJECTION, SOLUTION INTRAVENOUS at 14:38

## 2022-11-18 ENCOUNTER — TELEPHONE (OUTPATIENT)
Dept: MEDICAL GROUP | Facility: IMAGING CENTER | Age: 67
End: 2022-11-18

## 2022-11-19 NOTE — TELEPHONE ENCOUNTER
Please assist pt with scheduling follow up visit to review results.   Lumbar MRI with degenerative changes, renal ultrasound was normal and no significant findings for memory issues on brain MRI noted.

## 2022-11-30 ENCOUNTER — OFFICE VISIT (OUTPATIENT)
Dept: MEDICAL GROUP | Facility: IMAGING CENTER | Age: 67
End: 2022-11-30
Payer: MEDICARE

## 2022-11-30 VITALS
HEART RATE: 85 BPM | BODY MASS INDEX: 24.51 KG/M2 | OXYGEN SATURATION: 95 % | HEIGHT: 62 IN | TEMPERATURE: 97.6 F | RESPIRATION RATE: 16 BRPM | WEIGHT: 133.2 LBS | DIASTOLIC BLOOD PRESSURE: 76 MMHG | SYSTOLIC BLOOD PRESSURE: 138 MMHG

## 2022-11-30 DIAGNOSIS — M54.50 LOW BACK PAIN RADIATING TO LEFT LEG: ICD-10-CM

## 2022-11-30 DIAGNOSIS — N39.41 URGE INCONTINENCE: ICD-10-CM

## 2022-11-30 DIAGNOSIS — I10 ESSENTIAL HYPERTENSION: ICD-10-CM

## 2022-11-30 DIAGNOSIS — K21.9 GASTROESOPHAGEAL REFLUX DISEASE WITHOUT ESOPHAGITIS: ICD-10-CM

## 2022-11-30 DIAGNOSIS — E78.5 DYSLIPIDEMIA: ICD-10-CM

## 2022-11-30 DIAGNOSIS — R20.8 BURNING SENSATION OF FEET: ICD-10-CM

## 2022-11-30 DIAGNOSIS — R41.3 MEMORY PROBLEM: ICD-10-CM

## 2022-11-30 DIAGNOSIS — M25.552 LEFT HIP PAIN: ICD-10-CM

## 2022-11-30 DIAGNOSIS — Z63.79 STRESS DUE TO ILLNESS OF FAMILY MEMBER: ICD-10-CM

## 2022-11-30 DIAGNOSIS — R73.01 ELEVATED FASTING BLOOD SUGAR: ICD-10-CM

## 2022-11-30 DIAGNOSIS — M79.605 LOW BACK PAIN RADIATING TO LEFT LEG: ICD-10-CM

## 2022-11-30 PROCEDURE — 99214 OFFICE O/P EST MOD 30 MIN: CPT | Performed by: FAMILY MEDICINE

## 2022-11-30 ASSESSMENT — PAIN SCALES - GENERAL: PAINLEVEL: NO PAIN

## 2022-11-30 ASSESSMENT — FIBROSIS 4 INDEX: FIB4 SCORE: 1.43

## 2022-11-30 NOTE — PROGRESS NOTES
SUBJECTIVE:    Chief Complaint   Patient presents with    Results     MRI        HPI:     Funmilayo Egan is a 67 y.o. female with hypertension, dyslipidemia, GERD, urge incontinence and osteoporosis here for follow-up of imaging and medical issues.    Notes mostly left side posterior hip and lower lumbar symptoms which may go down the backside of her left leg.  She did have lumbar MRI completed.  She is currently in physical therapy which helps to alleviate some of her symptoms.  May get a sudden pain of the area on the left side but seems overall less down to 2-3 times a day with certain movements.  She is also working on some lower extremity exercises with her physical therapist.  Memory problem-had brain MRI done recently without significant findings for memory issues.  Has been learning the computer which has been challenging, her teacher has been inpatient.  States she generally feels pressured all the time.  Not enough time to complete everything.  May feel overwhelmed.  Also has a  at home with some medical issues and uncertain if he is trying to make things difficult for her.  Interested in counseling therapy.    Notes having a burning sensation on her feet bilaterally on the bottom and tops of her feet which has been going on for a while.  States she will notice it more if she is standing for longer periods of time.  Might notice some redness of her feet at that time as well.  She does have a history of slightly elevated blood sugars.    Urgent continence-had renal ultrasound completed.  Declined medication therapy.  Did pelvic floor therapy in the past.  Feels her symptoms have gotten worse.  She is interested in seeing the urologist.    Blood pressure usually in the 130s.  Reflux otherwise stable.      ROS:  No recent fevers or chills. No nausea or vomiting. No diarrhea. No chest pains or shortness of breath. No lower extremity edema.    Current Outpatient Medications on File Prior to Visit  "  Medication Sig Dispense Refill    raloxifene (EVISTA) 60 MG Tab TAKE 1 TABLET BY MOUTH EVERY DAY 90 Tablet 3    amLODIPine (NORVASC) 2.5 MG Tab Take 1 Tablet by mouth every day. 90 Tablet 3    sertraline (ZOLOFT) 50 MG Tab Take 1 Tablet by mouth every day. 90 Tablet 3    NITRO-BID 2 % Ointment Place 0.5 Inches on the skin 4 times a day. On bottom      docusate sodium 100 MG Cap Take 100 mg by mouth 2 Times a Day. 60 capsule 1    polyethylene glycol/lytes (MIRALAX) 17 g Pack Take 1 Packet by mouth every day. (Patient taking differently: Take 17 g by mouth 2 times a day.) 30 Each 1    Turmeric Powder Take 1 Scoop by mouth every day.       No current facility-administered medications on file prior to visit.       Allergies   Allergen Reactions    Ciprofloxacin      Pt reports that it took out her achilias tendon    Pcn [Penicillins] Itching     Blisters    Sulfa Drugs Rash     7/15/19 welting urticaria/rash after taking Bactrim        Patient Active Problem List    Diagnosis Date Noted    Bilateral foot pain 05/17/2022    Scalp irritation 05/17/2022    Gastroesophageal reflux disease without esophagitis 09/10/2021    Generalized abdominal pain 12/21/2020    Urge incontinence of urine 04/12/2019    Hair pulling 03/12/2019    Essential hypertension 03/12/2019    Age-related osteoporosis without current pathological fracture 03/12/2019    Dyslipidemia 03/12/2019       Past Medical History:   Diagnosis Date    ASTHMA     remote hx    Bowel habit changes     Chest discomfort 04/2019    Coronary CT scan with LAD 11.4.     COPD     mild, not on meds for it    Hyperlipidemia     Hypertension     OSTEOPOROSIS     Shortness of breath 05/2019    Echocardiogram with normal LV size, LVEF 65%.Trace MR. Mild TR. RVSP 30mmHg.         OBJECTIVE:   /76 (BP Location: Left arm, Patient Position: Sitting, BP Cuff Size: Adult)   Pulse 85   Temp 36.4 °C (97.6 °F) (Temporal)   Resp 16   Ht 1.575 m (5' 2\")   Wt 60.4 kg (133 lb 3.2 " oz)   SpO2 95%   BMI 24.36 kg/m²   General: Well-developed well-nourished female, no acute distress  Neck: supple, no lymphadenopathy- cervical or supraclavicular, no thyromegaly  Cardiovascular: regular rate and rhythm, no murmurs, gallops, rubs  Lungs: clear to auscultation bilaterally, no wheezes, crackles, or rhonchi  Extremities: no cyanosis, clubbing, edema.  Slight limited range of motion of left hip compared to right.  Tenderness to palpation of left posterior hip and lower lumbar region.  2+ pedal pulses, sensation of feet intact.  Skin: Warm and dry  Psych: appropriate mood and affect    [phone] 11/16/2022 Urgent     Narrative & Impression     11/16/2022 1:42 PM     HISTORY/REASON FOR EXAM: Chronic back pain.        TECHNIQUE/EXAM DESCRIPTION:  MRI of the lumbar spine without contrast.     The study was performed on a [employer] 1.5 Claudia MRI scanner. T1 sagittal, T2 sagittal, and T2 axial images were obtained of the lumbar spine.     COMPARISON: None.     FINDINGS:  There is mild convex left scoliotic curvature.  No evidence of marrow edema or fracture.  There is multilevel loss of the normal disc height and bright T2 disc signal.  No spinal canal masses seen.  The conus is located normally at T12-L1.  There are left renal parapelvic simple cysts. No follow-up recommended.     Findings at specific levels:     T12-L1: No significant central canal or neuroforaminal narrowing.     L1-2: There is annular disk bulge and bilateral facet degeneration without significant central canal or neural foraminal narrowing.     L2-3: There is annular disk bulge and bilateral facet degeneration without significant central canal or neural foraminal narrowing.     L3-4: There is annular disc bulge and bilateral facet degeneration with a tiny tear of the annulus fibrosis. No central canal narrowing. There is mild bilateral neural foraminal narrowing     L4-5: There is annular disc bulge and bilateral facet degeneration.  There is borderline central canal narrowing. There is mild right and moderate left neural foraminal narrowing.     L5-S1: There is annular disc bulge and bilateral facet degeneration. No central canal narrowing. There is moderate left and mild right neural foraminal narrowing.     IMPRESSION:     1.  Multilevel degenerative disc disease and facet degeneration. There is borderline central canal narrowing at L4-5.     2.  Varying degrees of neural foraminal narrowing at levels as specifically described above.     3.  Mild convex left scoliotic curvature.           Exam Ended: 11/16/22  2:37 PM Last Resulted: 11/16/22  2:48 PM             Narrative & Impression     11/16/2022 1:43 PM     HISTORY/REASON FOR EXAM:  Memory loss and urinary frequency and incontinence     TECHNIQUE/EXAM DESCRIPTION:   MRI of the brain without and with contrast.     Multiplanar multisequence MR examination of the brain with and without contrast.  13 mL ProHance contrast was administered intravenously.     COMPARISON:  None.     FINDINGS:     There is no acute infarct. There is no acute or chronic parenchymal hemorrhage. Mild cerebral volume loss is seen. A few nonspecific T2 hyperintensities in the subcortical and periventricular white matter. There is no intra-axial   space-occupying lesion. There is no hippocampal volume loss or signal change. The ventricles, cortical sulci and the basal cisterns are prominent consistent with mild age-appropriate cerebral volume loss. There is no extra-axial fluid collection,   hemorrhage or mass. The intracranial right vertebral artery is hypoplastic. The basilar artery and both posterior cerebral arteries are widely patent.  There is no large lesion identified in the expected course of the intracranial portions of the cranial   nerves.     The skull bones are unremarkable.        IMPRESSION:     1.  No acute abnormality.  2.  A few nonspecific T2 hyperintensities in the supratentorial white matter likely  representing nonspecific foci of gliosis.  3.  In view of history of memory loss, there is no MR evidence of normal pressure hydrocephalus, any neurodegenerative pattern volume loss or severe chronic microvascular ischemic disease.  4.  There is no abnormal parenchymal or meningeal contrast enhancement.           Exam Ended: 11/16/22  2:48 PM Last Resulted: 11/16/22  3:23 PM           Narrative & Impression     11/16/2022 2:54 PM     HISTORY/REASON FOR EXAM:  assess PVR, incomplete bladder emptying        TECHNIQUE/EXAM DESCRIPTION:  Renal ultrasound.     COMPARISON:  None     FINDINGS:     The right kidney measures 9.43 cm.  The right kidney appears normal in contour and parenchymal echotexture. The corticomedullary differentiation is preserved. The right renal collecting system is not dilated. No hydronephrosis. There are no renal   calculi.     The left kidney measures 10.21 cm. The left kidney appears normal in contour and parenchymal echotexture. The corticomedullary differentiation is preserved. The left renal collecting system is not dilated. No hydronephrosis. There are no renal calculi. A   2.1 cm cyst in the left kidney.     The bladder demonstrates no focal wall abnormality.     Post void residual of 13 mL.     IMPRESSION:     1.  No hydronephrosis. No renal calculus.     2.  Small Postvoid residual of 13 mL.           Exam Ended: 11/16/22  3:12 PM Last Resulted: 11/16/22  3:20 PM           ASSESSMENT/PLAN:    67 y.o. female with hypertension, dyslipidemia, GERD, urge incontinence and osteoporosis.    1. Left hip pain -we will assess further with x-ray.  Continue physical therapy at this time.  Continue to monitor. DX-HIP-UNILATERAL-WITH PELVIS-1 VIEW LEFT      2. Low back pain radiating to left leg -assess further with EMG/NCS.  Monitor. VITAMIN D,25 HYDROXY (DEFICIENCY)    Referral to Neurodiagnostics (EEG,EP,EMG/NCS/DBS)      3. Memory problem -reviewed brain MRI imaging which was otherwise  unremarkable for current symptoms.  May be associated with current stressors.  Will refer to behavioral health for further management of current stress state. Referral to Behavioral Health      4. Stress due to illness of family member  Referral to Behavioral Health      5. Burning sensation of feet -denies numbness.  Assess with further with lab work and EMG/NCS. VITAMIN D,25 HYDROXY (DEFICIENCY)    Referral to Neurodiagnostics (EEG,EP,EMG/NCS/DBS)    VITAMIN B12    FOLATE      6. Urge incontinence -declined medication therapy.  Will refer to urology. Referral to Urology      7. Essential hypertension -stable, continue current medication.  Monitor. CBC WITH DIFFERENTIAL      8. Dyslipidemia -monitor lab work. Lipid Profile    TSH WITH REFLEX TO FT4      9. Elevated fasting blood sugar -monitor lab work. Comp Metabolic Panel    HEMOGLOBIN A1C      10. Gastroesophageal reflux disease without esophagitis -stable.  Modify diet.  Assess lab work. CBC WITH DIFFERENTIAL          Will plan to follow-up for annual wellness visit in future.  Follow-up in 3 weeks.      This medical record contains text that has been entered with the assistance of computer voice recognition and dictation software.  Therefore, it may contain unintended errors in text, spelling, punctuation, or grammar.

## 2022-12-05 ENCOUNTER — HOSPITAL ENCOUNTER (OUTPATIENT)
Dept: LAB | Facility: MEDICAL CENTER | Age: 67
End: 2022-12-05
Attending: FAMILY MEDICINE
Payer: MEDICARE

## 2022-12-05 ENCOUNTER — HOSPITAL ENCOUNTER (OUTPATIENT)
Dept: RADIOLOGY | Facility: MEDICAL CENTER | Age: 67
End: 2022-12-05
Attending: FAMILY MEDICINE
Payer: MEDICARE

## 2022-12-05 DIAGNOSIS — R73.01 ELEVATED FASTING BLOOD SUGAR: ICD-10-CM

## 2022-12-05 DIAGNOSIS — E78.5 DYSLIPIDEMIA: ICD-10-CM

## 2022-12-05 DIAGNOSIS — K21.9 GASTROESOPHAGEAL REFLUX DISEASE WITHOUT ESOPHAGITIS: ICD-10-CM

## 2022-12-05 DIAGNOSIS — R20.8 BURNING SENSATION OF FEET: ICD-10-CM

## 2022-12-05 DIAGNOSIS — M54.50 LOW BACK PAIN RADIATING TO LEFT LEG: ICD-10-CM

## 2022-12-05 DIAGNOSIS — M25.552 LEFT HIP PAIN: ICD-10-CM

## 2022-12-05 DIAGNOSIS — I10 ESSENTIAL HYPERTENSION: ICD-10-CM

## 2022-12-05 DIAGNOSIS — M79.605 LOW BACK PAIN RADIATING TO LEFT LEG: ICD-10-CM

## 2022-12-06 ENCOUNTER — HOSPITAL ENCOUNTER (OUTPATIENT)
Dept: LAB | Facility: MEDICAL CENTER | Age: 67
End: 2022-12-06
Attending: FAMILY MEDICINE
Payer: MEDICARE

## 2022-12-06 ENCOUNTER — HOSPITAL ENCOUNTER (OUTPATIENT)
Dept: RADIOLOGY | Facility: MEDICAL CENTER | Age: 67
End: 2022-12-06
Attending: FAMILY MEDICINE
Payer: MEDICARE

## 2022-12-06 LAB
25(OH)D3 SERPL-MCNC: 56 NG/ML (ref 30–100)
ALBUMIN SERPL BCP-MCNC: 4.6 G/DL (ref 3.2–4.9)
ALBUMIN/GLOB SERPL: 1.9 G/DL
ALP SERPL-CCNC: 47 U/L (ref 30–99)
ALT SERPL-CCNC: 17 U/L (ref 2–50)
ANION GAP SERPL CALC-SCNC: 8 MMOL/L (ref 7–16)
AST SERPL-CCNC: 17 U/L (ref 12–45)
BASOPHILS # BLD AUTO: 0.3 % (ref 0–1.8)
BASOPHILS # BLD: 0.01 K/UL (ref 0–0.12)
BILIRUB SERPL-MCNC: 0.4 MG/DL (ref 0.1–1.5)
BUN SERPL-MCNC: 15 MG/DL (ref 8–22)
CALCIUM SERPL-MCNC: 9.4 MG/DL (ref 8.4–10.2)
CHLORIDE SERPL-SCNC: 106 MMOL/L (ref 96–112)
CHOLEST SERPL-MCNC: 253 MG/DL (ref 100–199)
CO2 SERPL-SCNC: 28 MMOL/L (ref 20–33)
CREAT SERPL-MCNC: 0.64 MG/DL (ref 0.5–1.4)
EOSINOPHIL # BLD AUTO: 0.32 K/UL (ref 0–0.51)
EOSINOPHIL NFR BLD: 8.5 % (ref 0–6.9)
ERYTHROCYTE [DISTWIDTH] IN BLOOD BY AUTOMATED COUNT: 44.5 FL (ref 35.9–50)
EST. AVERAGE GLUCOSE BLD GHB EST-MCNC: 105 MG/DL
FASTING STATUS PATIENT QL REPORTED: NORMAL
FOLATE SERPL-MCNC: 21.7 NG/ML
GFR SERPLBLD CREATININE-BSD FMLA CKD-EPI: 97 ML/MIN/1.73 M 2
GLOBULIN SER CALC-MCNC: 2.4 G/DL (ref 1.9–3.5)
GLUCOSE SERPL-MCNC: 100 MG/DL (ref 65–99)
HBA1C MFR BLD: 5.3 % (ref 4–5.6)
HCT VFR BLD AUTO: 43.2 % (ref 37–47)
HDLC SERPL-MCNC: 71 MG/DL
HGB BLD-MCNC: 14.7 G/DL (ref 12–16)
IMM GRANULOCYTES # BLD AUTO: 0.01 K/UL (ref 0–0.11)
IMM GRANULOCYTES NFR BLD AUTO: 0.3 % (ref 0–0.9)
LDLC SERPL CALC-MCNC: 162 MG/DL
LYMPHOCYTES # BLD AUTO: 1.24 K/UL (ref 1–4.8)
LYMPHOCYTES NFR BLD: 33.1 % (ref 22–41)
MCH RBC QN AUTO: 31.1 PG (ref 27–33)
MCHC RBC AUTO-ENTMCNC: 34 G/DL (ref 33.6–35)
MCV RBC AUTO: 91.5 FL (ref 81.4–97.8)
MONOCYTES # BLD AUTO: 0.4 K/UL (ref 0–0.85)
MONOCYTES NFR BLD AUTO: 10.7 % (ref 0–13.4)
NEUTROPHILS # BLD AUTO: 1.77 K/UL (ref 2–7.15)
NEUTROPHILS NFR BLD: 47.1 % (ref 44–72)
NRBC # BLD AUTO: 0 K/UL
NRBC BLD-RTO: 0 /100 WBC
PLATELET # BLD AUTO: 199 K/UL (ref 164–446)
PMV BLD AUTO: 10 FL (ref 9–12.9)
POTASSIUM SERPL-SCNC: 4.5 MMOL/L (ref 3.6–5.5)
PROT SERPL-MCNC: 7 G/DL (ref 6–8.2)
RBC # BLD AUTO: 4.72 M/UL (ref 4.2–5.4)
SODIUM SERPL-SCNC: 142 MMOL/L (ref 135–145)
TRIGL SERPL-MCNC: 102 MG/DL (ref 0–149)
TSH SERPL DL<=0.005 MIU/L-ACNC: 2.69 UIU/ML (ref 0.38–5.33)
VIT B12 SERPL-MCNC: 890 PG/ML (ref 211–911)
WBC # BLD AUTO: 3.8 K/UL (ref 4.8–10.8)

## 2022-12-06 PROCEDURE — 83036 HEMOGLOBIN GLYCOSYLATED A1C: CPT

## 2022-12-06 PROCEDURE — 82607 VITAMIN B-12: CPT

## 2022-12-06 PROCEDURE — 82306 VITAMIN D 25 HYDROXY: CPT

## 2022-12-06 PROCEDURE — 36415 COLL VENOUS BLD VENIPUNCTURE: CPT

## 2022-12-06 PROCEDURE — 80053 COMPREHEN METABOLIC PANEL: CPT

## 2022-12-06 PROCEDURE — 85025 COMPLETE CBC W/AUTO DIFF WBC: CPT

## 2022-12-06 PROCEDURE — 84443 ASSAY THYROID STIM HORMONE: CPT

## 2022-12-06 PROCEDURE — 73501 X-RAY EXAM HIP UNI 1 VIEW: CPT | Mod: LT

## 2022-12-06 PROCEDURE — 82746 ASSAY OF FOLIC ACID SERUM: CPT

## 2022-12-06 PROCEDURE — 80061 LIPID PANEL: CPT

## 2022-12-16 ENCOUNTER — OFFICE VISIT (OUTPATIENT)
Dept: MEDICAL GROUP | Facility: IMAGING CENTER | Age: 67
End: 2022-12-16
Payer: MEDICARE

## 2022-12-16 VITALS
OXYGEN SATURATION: 98 % | HEART RATE: 79 BPM | BODY MASS INDEX: 24.22 KG/M2 | DIASTOLIC BLOOD PRESSURE: 76 MMHG | HEIGHT: 62 IN | SYSTOLIC BLOOD PRESSURE: 124 MMHG | WEIGHT: 131.6 LBS | TEMPERATURE: 97.8 F

## 2022-12-16 DIAGNOSIS — Z23 NEED FOR PNEUMOCOCCAL VACCINATION: ICD-10-CM

## 2022-12-16 DIAGNOSIS — K21.9 GASTROESOPHAGEAL REFLUX DISEASE WITHOUT ESOPHAGITIS: ICD-10-CM

## 2022-12-16 DIAGNOSIS — R41.3 MEMORY PROBLEM: ICD-10-CM

## 2022-12-16 DIAGNOSIS — Z00.00 MEDICARE ANNUAL WELLNESS VISIT, SUBSEQUENT: ICD-10-CM

## 2022-12-16 DIAGNOSIS — F63.3 HAIR PULLING: ICD-10-CM

## 2022-12-16 DIAGNOSIS — E78.5 DYSLIPIDEMIA: ICD-10-CM

## 2022-12-16 DIAGNOSIS — R89.8 EOSINOPHIL COUNT RAISED: ICD-10-CM

## 2022-12-16 DIAGNOSIS — M81.0 AGE-RELATED OSTEOPOROSIS WITHOUT CURRENT PATHOLOGICAL FRACTURE: ICD-10-CM

## 2022-12-16 DIAGNOSIS — M25.552 LEFT HIP PAIN: ICD-10-CM

## 2022-12-16 DIAGNOSIS — Z91.09 ENVIRONMENTAL ALLERGIES: ICD-10-CM

## 2022-12-16 DIAGNOSIS — I10 ESSENTIAL HYPERTENSION: ICD-10-CM

## 2022-12-16 DIAGNOSIS — N39.41 URGE INCONTINENCE OF URINE: ICD-10-CM

## 2022-12-16 DIAGNOSIS — M47.816 LUMBAR SPONDYLOSIS: ICD-10-CM

## 2022-12-16 DIAGNOSIS — D72.819 LEUKOPENIA, UNSPECIFIED TYPE: ICD-10-CM

## 2022-12-16 DIAGNOSIS — R73.01 IFG (IMPAIRED FASTING GLUCOSE): ICD-10-CM

## 2022-12-16 PROCEDURE — G0009 ADMIN PNEUMOCOCCAL VACCINE: HCPCS | Performed by: FAMILY MEDICINE

## 2022-12-16 PROCEDURE — 90677 PCV20 VACCINE IM: CPT | Performed by: FAMILY MEDICINE

## 2022-12-16 PROCEDURE — G0439 PPPS, SUBSEQ VISIT: HCPCS | Mod: 25 | Performed by: FAMILY MEDICINE

## 2022-12-16 RX ORDER — ATORVASTATIN CALCIUM 10 MG/1
10 TABLET, FILM COATED ORAL NIGHTLY
Qty: 90 TABLET | Refills: 1 | Status: SHIPPED | OUTPATIENT
Start: 2022-12-16 | End: 2022-12-20 | Stop reason: SDUPTHER

## 2022-12-16 ASSESSMENT — ACTIVITIES OF DAILY LIVING (ADL): BATHING_REQUIRES_ASSISTANCE: 0

## 2022-12-16 ASSESSMENT — ENCOUNTER SYMPTOMS: GENERAL WELL-BEING: FAIR

## 2022-12-16 ASSESSMENT — FIBROSIS 4 INDEX: FIB4 SCORE: 1.39

## 2022-12-16 ASSESSMENT — PATIENT HEALTH QUESTIONNAIRE - PHQ9: CLINICAL INTERPRETATION OF PHQ2 SCORE: 0

## 2022-12-16 NOTE — PROGRESS NOTES
Chief Complaint   Patient presents with    Annual Wellness Visit       HPI:  Funmilayo Egan is a 67 y.o. here for Medicare Annual Wellness Visit   Hypertension- amlodipine 2.5 mg daily, tolerates well.   /80s at home.     Dyslipidemia- no medication. Elevated lipids on labs. She loves cheese.   Recommend Coenzyme Q10.    IFG- 100 glucose. She will look into nutritionist. Loves fruits.     Wbc low- mild. has allergies, eosinophils elevated. Rhinorrhea/congestion.     Hair pulling- on sertraline 50 mg. Started pulling hair when she was a child. Pulled out the thickest one possible. Medication started per Dr. Melody Holliday in past.     GERD-saw GI in past, was taking omeprazole, then stopped for 1-2 years. Last 3 weeks had reflux thus restarted. Has been a while seeing GI.     Osteoporosis- has been on evista.     Urge incontinence-has leakage.     Left side back hip pain- has done xray and physical therapy.   She will request new referral next year in January.     Memory issues- possible concentration issues- has behavioral health referral.       Patient Active Problem List    Diagnosis Date Noted    Bilateral foot pain 05/17/2022    Scalp irritation 05/17/2022    Gastroesophageal reflux disease without esophagitis 09/10/2021    Generalized abdominal pain 12/21/2020    Urge incontinence of urine 04/12/2019    Hair pulling 03/12/2019    Essential hypertension 03/12/2019    Age-related osteoporosis without current pathological fracture 03/12/2019    Dyslipidemia 03/12/2019       Current Outpatient Medications   Medication Sig Dispense Refill    atorvastatin (LIPITOR) 10 MG Tab Take 1 Tablet by mouth every evening. 90 Tablet 1    raloxifene (EVISTA) 60 MG Tab TAKE 1 TABLET BY MOUTH EVERY DAY 90 Tablet 3    amLODIPine (NORVASC) 2.5 MG Tab Take 1 Tablet by mouth every day. 90 Tablet 3    sertraline (ZOLOFT) 50 MG Tab Take 1 Tablet by mouth every day. 90 Tablet 3    NITRO-BID 2 % Ointment Place 0.5 Inches on the skin 4  times a day. On bottom      docusate sodium 100 MG Cap Take 100 mg by mouth 2 Times a Day. 60 capsule 1    polyethylene glycol/lytes (MIRALAX) 17 g Pack Take 1 Packet by mouth every day. (Patient taking differently: Take 17 g by mouth 2 times a day.) 30 Each 1    Turmeric Powder Take 1 Scoop by mouth every day.       No current facility-administered medications for this visit.          Current supplements as per medication list.     Allergies: Ciprofloxacin, Pcn [penicillins], and Sulfa drugs    Current social contact/activities: just finished physical therapy, looking for new fitness activities     She  reports that she has quit smoking. Her smoking use included cigarettes. She has never used smokeless tobacco. She reports that she does not currently use alcohol after a past usage of about 2.4 oz per week. She reports current drug use. Drug: Oral.  Counseling given: Not Answered  Tobacco comments: 20 y.a      ROS:    Gait: Uses no assistive device  Ostomy: No  Other tubes: No  Amputations: No  Chronic oxygen use: No  Last eye exam: June 2021  Wears hearing aids: No   : Reports urinary leakage during the last 6 months that has interfered a lot with their daily activities or sleep.    Screening:  Up to date.   Dexa 2021.   Mammogram 2022.   Colonoscopy 2018. Had polyps.    Depression Screening  Little interest or pleasure in doing things?  0 - not at all  Feeling down, depressed , or hopeless? 0 - not at all  Trouble falling or staying asleep, or sleeping too much?     Feeling tired or having little energy?     Poor appetite or overeating?     Feeling bad about yourself - or that you are a failure or have let yourself or your family down?    Trouble concentrating on things, such as reading the newspaper or watching television?    Moving or speaking so slowly that other people could have noticed.  Or the opposite - being so fidgety or restless that you have been moving around a lot more than usual?     Thoughts that  you would be better off dead, or of hurting yourself?     Patient Health Questionnaire Score:      If depressive symptoms identified deferred to follow up visit unless specifically addressed in assessment and plan.    Interpretation of PHQ-9 Total Score   Score Severity   1-4 No Depression   5-9 Mild Depression   10-14 Moderate Depression   15-19 Moderately Severe Depression   20-27 Severe Depression    Screening for Cognitive Impairment  Three Minute Recall (daughter, heaven, efrem) 1/3    Ricky clock face with all 12 numbers and set the hands to show 10 past 11.  Yes    Cognitive concerns identified deferred for follow up unless specifically addressed in assessment and plan.    Fall Risk Assessment  Has the patient had two or more falls in the last year or any fall with injury in the last year?  No    Safety Assessment  Throw rugs on floor.  No  Handrails on all stairs.  Yes  Good lighting in all hallways.  Yes  Difficulty hearing.  No  Patient counseled about all safety risks that were identified.    Functional Assessment ADLs  Are there any barriers preventing you from cooking for yourself or meeting nutritional needs?  No.    Are there any barriers preventing you from driving safely or obtaining transportation?  No.    Are there any barriers preventing you from using a telephone or calling for help?  No    Are there any barriers preventing you from shopping?  No.    Are there any barriers preventing you from taking care of your own finances?  No    Are there any barriers preventing you from managing your medications?  No    Are there any barriers preventing you from showering, bathing or dressing yourself? No    Are you currently engaging in any exercise or physical activity?  No. Just finished PT, looking for new fitness  What is your perception of your health? Fair    Advance Care Planning  Do you have an Advance Directive, Living Will, Durable Power of , or POLST? No                 Health  Maintenance Summary            Overdue - Annual Wellness Visit (Every 366 Days) Overdue - never done      No completion history exists for this topic.              Overdue - IMM HEP B VACCINE (2 of 3 - Hep B Twinrix 3-dose series) Overdue since 1/5/2007 12/08/2006  Imm Admin: Hep A/HEP B Combined Vaccine (TwinRix)              Ordered - IMM PNEUMOCOCCAL VACCINE: 65+ Years (2 - PCV) Ordered on 12/16/2022 07/23/2018  Imm Admin: Pneumococcal polysaccharide vaccine (PPSV-23)              MAMMOGRAM (Every 2 Years) Next due on 5/23/2024 05/23/2022  MA-SCREENING MAMMO BILAT W/TOMOSYNTHESIS W/CAD    04/01/2021  MA-SCREENING MAMMO BILAT W/TOMOSYNTHESIS W/CAD    04/15/2019  MA-SCREENING MAMMO BILAT W/TOMOSYNTHESIS W/CAD    10/17/2017  MA-SCREENING MAMMO BILAT W/CAD    12/04/2009  MA-SCREENING DIGITAL MAMMO    Only the first 5 history entries have been loaded, but more history exists.              BONE DENSITY (Every 5 Years) Next due on 4/1/2026 04/01/2021  DS-BONE DENSITY STUDY (DEXA)    12/04/2009  DS-BONE DENSITY STUDY (DEXA)    03/02/2007  DS-BONE DENSITY STUDY (DEXA)              IMM DTaP/Tdap/Td Vaccine (2 - Td or Tdap) Next due on 8/21/2026 08/21/2016  Imm Admin: Tdap Vaccine    12/08/2006  Imm Admin: TD Vaccine              COLORECTAL CANCER SCREENING (COLONOSCOPY - Preferred) Next due on 6/26/2028 06/26/2018  REFERRAL TO GI FOR COLONOSCOPY              IMM ZOSTER VACCINES (Series Information) Completed      04/10/2019  Imm Admin: Zoster Vaccine Recombinant (RZV) (SHINGRIX)    07/23/2018  Imm Admin: Zoster Vaccine Recombinant (RZV) (SHINGRIX)    07/22/2017  Imm Admin: Zoster Vaccine Live (ZVL) (Zostavax) - HISTORICAL DATA              HEPATITIS C SCREENING  Completed      07/09/2020  HEP C VIRUS ANTIBODY    05/10/2019  HEP C VIRUS ANTIBODY              IMM INFLUENZA (Series Information) Completed      11/03/2022  Imm Admin: Influenza, Unspecified - HISTORICAL DATA    10/15/2021  Imm  Admin: Influenza, Unspecified - HISTORICAL DATA    09/15/2020  Imm Admin: Influenza Vaccine Adult HD    10/16/2019  Imm Admin: Influenza Vac Subunit Quad Inj (Pf)    10/20/2018  Imm Admin: Influenza Vac Subunit Quad Inj (Pf)    Only the first 5 history entries have been loaded, but more history exists.              COVID-19 Vaccine (Series Information) Completed      11/03/2022  Imm Admin: MODERNA BIVALENT BOOSTER SARS-COV-2 VACCINE (6+)    05/10/2022  Imm Admin: MODERNA SARS-COV-2 VACCINE (12+)    11/20/2021  Imm Admin: MODERNA SARS-COV-2 VACCINE (12+)    04/01/2021  Imm Admin: MODERNA SARS-COV-2 VACCINE (12+)    03/05/2021  Imm Admin: MODERNA SARS-COV-2 VACCINE (12+)              IMM MENINGOCOCCAL ACWY VACCINE (Series Information) Aged Out      No completion history exists for this topic.              Discontinued - CERVICAL CANCER SCREENING  Discontinued        Frequency changed to Never automatically (Topic No Longer Applies)    04/26/2019  THINPREP PAP WITH HPV    04/26/2019  Pathology Gynecology Specimen                    Patient Care Team:  Samina Mera M.D. as PCP - General (Family Medicine)  Sarah Cortés, Med Ass't as [facility]       Social History     Tobacco Use    Smoking status: Former     Packs/day: 0.00     Types: Cigarettes    Smokeless tobacco: Never    Tobacco comments:     20 y.a   Vaping Use    Vaping Use: Never used   Substance Use Topics    Alcohol use: Not Currently     Alcohol/week: 2.4 oz     Types: 4 Glasses of wine per week     Comment: stopped in 2020    Drug use: Yes     Types: Oral     Comment: 4/17/2021     Family History   Problem Relation Age of Onset    Heart Disease Mother 65    Cancer Father     Heart Disease Brother      She  has a past medical history of ASTHMA, Bowel habit changes, Chest discomfort (04/2019), COPD, Hyperlipidemia, Hypertension, OSTEOPOROSIS, and Shortness of breath (05/2019).   Past Surgical History:   Procedure Laterality Date  "   HEMORRHOIDECTOMY N/A 4/19/2021    Procedure: HEMORRHOIDECTOMY - INTERNAL AND EXTERNAL, COMPLEX OR EXTENSIVE.;  Surgeon: Esteal Kenney M.D.;  Location: SURGERY [facility];  Service: General    OTHER  2020    Esophagus    ABDOMINAL HYSTERECTOMY TOTAL      APPENDECTOMY      HEMORRHOIDECTOMY         Exam:   /76 (BP Location: Right arm, Patient Position: Sitting, BP Cuff Size: Adult)   Pulse 79   Temp 36.6 °C (97.8 °F) (Temporal)   Ht 1.575 m (5' 2\")   Wt 59.7 kg (131 lb 9.6 oz)   SpO2 98%  Body mass index is 24.07 kg/m².    Hearing good.    Dentition good  Alert, oriented in no acute distress.  Eye contact is good, speech goal directed, affect calm  Constitutional: She appears well-developed and well-nourished. She appears not diaphoretic. No distress.   HENT: Right Ear: External ear normal. Left Ear: External ear normal. Tympanic membranes clear and intact.   Nose: Nose normal.   Mouth/Throat: Oropharynx is clear and moist. No oropharyngeal exudate.     Eyes: Conjunctivae and extraocular motions are normal. Pupils are equal, round, and reactive to light. No scleral icterus.   Neck: Normal range of motion. Neck supple. No thyromegaly present.   Cardiovascular: Normal rate, regular rhythm, normal heart sounds and intact distal pulses.  Exam reveals no gallop and no friction rub.  No murmur heard. No carotid bruits.   Pulmonary/Chest: Effort normal and breath sounds normal. No respiratory distress. She has no wheezes. She has no rales.   Abdominal: Soft. Bowel sounds are normal. She exhibits no distension and no mass. No tenderness. She has no rebound and no guarding.   Lymphadenopathy:  She has no cervical adenopathy.   Neurological: She is alert. She has normal reflexes. No cranial nerve deficit. She exhibits normal muscle tone.   Skin: Skin is warm and dry. No rash noted. She is not diaphoretic. No erythema.   Psychiatric: She has a normal mood and affect. Her behavior is normal.   Musculoskeletal: " She exhibits no edema. Full strength throughout. 2+ DTR throughout.        Latest Reference Range & Units 12/06/22 08:15   WBC 4.8 - 10.8 K/uL 3.8 (L)   RBC 4.20 - 5.40 M/uL 4.72   Hemoglobin 12.0 - 16.0 g/dL 14.7   Hematocrit 37.0 - 47.0 % 43.2   MCV 81.4 - 97.8 fL 91.5   MCH 27.0 - 33.0 pg 31.1   MCHC 33.6 - 35.0 g/dL 34.0   RDW 35.9 - 50.0 fL 44.5   Platelet Count 164 - 446 K/uL 199   MPV 9.0 - 12.9 fL 10.0   Neutrophils-Polys 44.00 - 72.00 % 47.10   Neutrophils (Absolute) 2.00 - 7.15 K/uL 1.77 (L)   Lymphocytes 22.00 - 41.00 % 33.10   Lymphs (Absolute) 1.00 - 4.80 K/uL 1.24   Monocytes 0.00 - 13.40 % 10.70   Monos (Absolute) 0.00 - 0.85 K/uL 0.40   Eosinophils 0.00 - 6.90 % 8.50 (H)   Eos (Absolute) 0.00 - 0.51 K/uL 0.32   Basophils 0.00 - 1.80 % 0.30   Baso (Absolute) 0.00 - 0.12 K/uL 0.01   Immature Granulocytes 0.00 - 0.90 % 0.30   Immature Granulocytes (abs) 0.00 - 0.11 K/uL 0.01   Nucleated RBC /100 WBC 0.00   NRBC (Absolute) K/uL 0.00   Sodium 135 - 145 mmol/L 142   Potassium 3.6 - 5.5 mmol/L 4.5   Chloride 96 - 112 mmol/L 106   Co2 20 - 33 mmol/L 28   Anion Gap 7.0 - 16.0  8.0   Glucose 65 - 99 mg/dL 100 (H)   Bun 8 - 22 mg/dL 15   Creatinine 0.50 - 1.40 mg/dL 0.64   GFR (CKD-EPI) >60 mL/min/1.73 m 2 97   Calcium 8.4 - 10.2 mg/dL 9.4   AST(SGOT) 12 - 45 U/L 17   ALT(SGPT) 2 - 50 U/L 17   Alkaline Phosphatase 30 - 99 U/L 47   Total Bilirubin 0.1 - 1.5 mg/dL 0.4   Albumin 3.2 - 4.9 g/dL 4.6   Total Protein 6.0 - 8.2 g/dL 7.0   Globulin 1.9 - 3.5 g/dL 2.4   A-G Ratio g/dL 1.9   Glycohemoglobin 4.0 - 5.6 % 5.3   Estim. Avg Glu mg/dL 105   Fasting Status  Fasting   Cholesterol,Tot 100 - 199 mg/dL 253 (H)   Triglycerides 0 - 149 mg/dL 102   HDL >=40 mg/dL 71   LDL <100 mg/dL 162 (H)   25-Hydroxy   Vitamin D 25 30 - 100 ng/mL 56   Folate -Folic Acid >4.0 ng/mL 21.7   Vitamin B12 -True Cobalamin 211 - 911 pg/mL 890   TSH 0.380 - 5.330 uIU/mL 2.690   (L): Data is abnormally low  (H): Data is abnormally  high    Narrative & Impression     12/6/2022 8:20 AM     HISTORY/REASON FOR EXAM:  Left hip pain..        TECHNIQUE/EXAM DESCRIPTION AND NUMBER OF VIEWS:  2 views of the LEFT hip.     COMPARISON: None     FINDINGS:  No evidence of fracture or arthropathy of the left hip. There are pelvic phleboliths. There is mild degenerative change of the lumbar spine.     IMPRESSION:     No evidence of left hip fracture or arthropathy.           Exam Ended: 12/06/22  8:30 AM Last Resulted: 12/06/22  8:35 AM               Assessment and Plan. The following treatment and monitoring plan is recommended:      1. Medicare annual wellness visit, subsequent   Recommend healthy diet and routine exercise.        2. Essential hypertension - stable, continue current medications.   Lipid Profile      3. Dyslipidemia -uncontrolled.  Start atorvastatin 10 mg daily. Heart healthy diet and routine exercise. Repeat labs in 6 months. Reviewed precautions and potential side effects of medication therapy.  Lipid Profile  Atorvastatin 10 mg.      4. IFG (impaired fasting glucose) -stable.   Recommend low sugar/low processed diet and routine exercise. Monitor.  Comp Metabolic Panel    HEMOGLOBIN A1C      5. Leukopenia, unspecified type - mild, monitor.  CBC WITH DIFFERENTIAL      6. Eosinophil count raised - likely due to allergies.  Monitor in future.        7. Environmental allergies - consider otc medication as needed.        8. Hair pulling -stable, continue on zoloft 50 mg daily. Monitor.        9. Gastroesophageal reflux disease without esophagitis - stable back on PPI therapy. Follow up with GI routinely. Modify diet. Monitor.        10. Age-related osteoporosis without current pathological fracture - stable, continue current medication. Monitor.       11. Urge incontinence of urine - continued symptoms, stable. May benefit from kegel exercises. Monitor.        12. Left hip pain - stable, followed up with physical therapy. Will request new  referral in future.        13. Lumbar spondylosis -stable, follow up with physical therapy.        14. Memory problem - has had imaging.   Follow up with behavioral health.        15. Need for pneumococcal vaccination  Pneumococcal Conjugate Vaccine 20-Valent (19 yrs+)          Services suggested: No services needed at this time  Health Care Screening: Age-appropriate preventive services recommended by USPTF and ACIP covered by Medicare were discussed today. Services ordered if indicated and agreed upon by the patient.  Referrals offered: Community-based lifestyle interventions to reduce health risks and promote self-management and wellness, fall prevention, nutrition, physical activity, tobacco-use cessation, weight loss, and mental health services as per orders if indicated.    Discussion today about general wellness and lifestyle habits:    Prevent falls and reduce trip hazards; Cautioned about securing or removing rugs.  Have a working fire alarm and carbon monoxide detector;   Engage in regular physical activity and social activities     Follow-up: Return in about 6 months (around 6/16/2023).  Follow up sooner as needed.   Recommend routine AWV.    This medical record contains text that has been entered with the assistance of computer voice recognition and dictation software.  Therefore, it may contain unintended errors in text, spelling, punctuation, or grammar.

## 2022-12-17 ENCOUNTER — APPOINTMENT (OUTPATIENT)
Dept: RADIOLOGY | Facility: MEDICAL CENTER | Age: 67
End: 2022-12-17
Attending: EMERGENCY MEDICINE
Payer: MEDICARE

## 2022-12-17 ENCOUNTER — HOSPITAL ENCOUNTER (EMERGENCY)
Facility: MEDICAL CENTER | Age: 67
End: 2022-12-17
Attending: EMERGENCY MEDICINE
Payer: MEDICARE

## 2022-12-17 VITALS
TEMPERATURE: 96.8 F | HEIGHT: 62 IN | RESPIRATION RATE: 18 BRPM | OXYGEN SATURATION: 95 % | BODY MASS INDEX: 24.75 KG/M2 | HEART RATE: 74 BPM | DIASTOLIC BLOOD PRESSURE: 85 MMHG | WEIGHT: 134.48 LBS | SYSTOLIC BLOOD PRESSURE: 139 MMHG

## 2022-12-17 DIAGNOSIS — S06.0X0A CONCUSSION WITHOUT LOSS OF CONSCIOUSNESS, INITIAL ENCOUNTER: ICD-10-CM

## 2022-12-17 DIAGNOSIS — S16.1XXA STRAIN OF NECK MUSCLE, INITIAL ENCOUNTER: ICD-10-CM
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
## 2022-12-17 PROCEDURE — 700102 HCHG RX REV CODE 250 W/ 637 OVERRIDE(OP): Performed by: EMERGENCY MEDICINE

## 2022-12-17 PROCEDURE — A9270 NON-COVERED ITEM OR SERVICE: HCPCS | Performed by: EMERGENCY MEDICINE

## 2022-12-17 PROCEDURE — 72125 CT NECK SPINE W/O DYE: CPT

## 2022-12-17 PROCEDURE — 99284 EMERGENCY DEPT VISIT MOD MDM: CPT

## 2022-12-17 PROCEDURE — 70450 CT HEAD/BRAIN W/O DYE: CPT

## 2022-12-17 PROCEDURE — 72128 CT CHEST SPINE W/O DYE: CPT

## 2022-12-17 RX ORDER — HYDROCODONE BITARTRATE AND ACETAMINOPHEN 5; 325 MG/1; MG/1
1 TABLET ORAL ONCE
Status: COMPLETED | OUTPATIENT
Start: 2022-12-17 | End: 2022-12-17

## 2022-12-17 RX ORDER — IBUPROFEN 600 MG/1
600 TABLET ORAL ONCE
Status: COMPLETED | OUTPATIENT
Start: 2022-12-17 | End: 2022-12-17

## 2022-12-17 RX ADMIN — HYDROCODONE BITARTRATE AND ACETAMINOPHEN 1 TABLET: 5; 325 TABLET ORAL at 16:25

## 2022-12-17 RX ADMIN — IBUPROFEN 600 MG: 600 TABLET, FILM COATED ORAL at 16:25

## 2022-12-17 ASSESSMENT — FIBROSIS 4 INDEX: FIB4 SCORE: 1.39

## 2022-12-17 NOTE — ED TRIAGE NOTES
Pt amb to triage c/o t5000 glf las tnoc. Pt slipped on ice and fell onto her back and head. Pt reports headache and pain along back progressively worsening. Pt tool advil for pain relief x1h ago
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
## 2022-12-20 ENCOUNTER — TELEPHONE (OUTPATIENT)
Dept: SOCIAL WORK | Facility: CLINIC | Age: 67
End: 2022-12-20
Payer: MEDICARE

## 2022-12-20 DIAGNOSIS — E78.5 DYSLIPIDEMIA: ICD-10-CM

## 2022-12-20 RX ORDER — ATORVASTATIN CALCIUM 10 MG/1
10 TABLET, FILM COATED ORAL NIGHTLY
Qty: 100 TABLET | Refills: 3 | Status: SHIPPED | OUTPATIENT
Start: 2022-12-20

## 2022-12-20 NOTE — TELEPHONE ENCOUNTER
Received request via: Pharmacy    Was the patient seen in the last year in this department? Yes    Does the patient have an active prescription (recently filled or refills available) for medication(s) requested? No    Does the patient have longterm Plus and need 100 day supply (blood pressure, diabetes and cholesterol meds only)? Yes, quantity updated to 100 days

## 2022-12-22 ENCOUNTER — OFFICE VISIT (OUTPATIENT)
Dept: MEDICAL GROUP | Facility: IMAGING CENTER | Age: 67
End: 2022-12-22
Payer: MEDICARE

## 2022-12-22 VITALS
DIASTOLIC BLOOD PRESSURE: 90 MMHG | TEMPERATURE: 97.7 F | RESPIRATION RATE: 18 BRPM | WEIGHT: 129.2 LBS | BODY MASS INDEX: 23.77 KG/M2 | HEIGHT: 62 IN | OXYGEN SATURATION: 100 % | HEART RATE: 80 BPM | SYSTOLIC BLOOD PRESSURE: 128 MMHG

## 2022-12-22 DIAGNOSIS — M54.6 ACUTE LEFT-SIDED THORACIC BACK PAIN: ICD-10-CM

## 2022-12-22 DIAGNOSIS — E04.2 MULTIPLE THYROID NODULES: ICD-10-CM

## 2022-12-22 DIAGNOSIS — M54.2 NECK PAIN ON LEFT SIDE: ICD-10-CM

## 2022-12-22 PROCEDURE — 99214 OFFICE O/P EST MOD 30 MIN: CPT

## 2022-12-22 RX ORDER — LIDOCAINE 50 MG/G
1 PATCH TOPICAL EVERY 24 HOURS
Qty: 10 PATCH | Refills: 1 | Status: ON HOLD | OUTPATIENT
Start: 2022-12-22 | End: 2022-12-30 | Stop reason: SDUPTHER

## 2022-12-22 RX ORDER — CYCLOBENZAPRINE HCL 5 MG
5-10 TABLET ORAL 3 TIMES DAILY PRN
Qty: 30 TABLET | Refills: 0 | Status: SHIPPED | OUTPATIENT
Start: 2022-12-22 | End: 2022-12-29

## 2022-12-22 ASSESSMENT — FIBROSIS 4 INDEX: FIB4 SCORE: 1.39

## 2022-12-22 ASSESSMENT — PAIN SCALES - GENERAL: PAINLEVEL: 5=MODERATE PAIN

## 2022-12-22 NOTE — TELEPHONE ENCOUNTER
Received request via: Patient    Was the patient seen in the last year in this department? Yes    Does the patient have an active prescription (recently filled or refills available) for medication(s) requested? No    Does the patient have jail Plus and need 100 day supply (blood pressure, diabetes and cholesterol meds only)? Medication is not for cholesterol, blood pressure or diabetes    Pt came in to the office requesting Nitroglycerin.

## 2022-12-22 NOTE — TELEPHONE ENCOUNTER
"ED Follow-up  Call Attempts: 3  Date of ED visit: 12/17/22  Call Outcome: Reviewed ED visit with patient  Since you've been home, how have you been feeling?: Unchanged  Were you prescribed any medications?: No  Do you have any questions about your discharge instructions?: No  RN Recommendations: Follow-up appointment scheduled  Additional details: Member has f/u appt. with PCP today. She states she is \"tight all over\"  Total time spent (mins): 2    "

## 2022-12-23 RX ORDER — NITROGLYCERIN 20 MG/G
OINTMENT TOPICAL
Qty: 30 G | Refills: 1 | Status: SHIPPED | OUTPATIENT
Start: 2022-12-23 | End: 2022-12-29

## 2022-12-23 NOTE — PROGRESS NOTES
Subjective:     CC:   Chief Complaint   Patient presents with    Follow-Up     From ER 12/17/22       HPI:   Funmilayo presents today to discuss:    ED Follow up  Patient presents today for f/u after ED visit on 12/17 after GLF where she reports hitting her head from slipping on ice. CT head and spine negative for fracture, traumatic listhesis of cervical spine, infarct, hemorrhage, or mass.   Patient presents today to reports of ongoing left sided neck pain, headache, and thoracic pain. She describes having muscle tightness of her neck and head. She reports having limited ROM of her neck. She does become nauseous when her headache flares up. She takes ibuprofen 600 mg which helps temporarily.    She denies confusion, altered mental status, dizziness, syncope, loss of balance, numbness, tingling, or weakness of any extremities.     Past Medical History:   Diagnosis Date    ASTHMA     remote hx    Bowel habit changes     Chest discomfort 04/2019    Coronary CT scan with LAD 11.4.     COPD     mild, not on meds for it    Hyperlipidemia     Hypertension     OSTEOPOROSIS     Shortness of breath 05/2019    Echocardiogram with normal LV size, LVEF 65%.Trace MR. Mild TR. RVSP 30mmHg.     Family History   Problem Relation Age of Onset    Heart Disease Mother 65    Cancer Father     Heart Disease Brother      Past Surgical History:   Procedure Laterality Date    HEMORRHOIDECTOMY N/A 4/19/2021    Procedure: HEMORRHOIDECTOMY - INTERNAL AND EXTERNAL, COMPLEX OR EXTENSIVE.;  Surgeon: Estela Kenney M.D.;  Location: SURGERY [facility];  Service: General    OTHER  2020    Esophagus    ABDOMINAL HYSTERECTOMY TOTAL      APPENDECTOMY      HEMORRHOIDECTOMY       Social History     Tobacco Use    Smoking status: Former     Packs/day: 0.00     Types: Cigarettes    Smokeless tobacco: Never    Tobacco comments:     20 y.a   Vaping Use    Vaping Use: Never used   Substance Use Topics    Alcohol use: Not Currently     Alcohol/week: 2.4 oz  "    Types: 4 Glasses of wine per week     Comment: stopped in 2020    Drug use: Yes     Types: Oral     Comment: 4/17/2021     Social History     Social History Narrative    Not on file     Current Outpatient Medications Ordered in Epic   Medication Sig Dispense Refill    cyclobenzaprine (FLEXERIL) 5 mg tablet Take 1-2 Tablets by mouth 3 times a day as needed for Muscle Spasms or Moderate Pain. 30 Tablet 0    lidocaine (LIDODERM) 5 % Patch Place 1 Patch on the skin every 24 hours. 10 Patch 1    atorvastatin (LIPITOR) 10 MG Tab Take 1 Tablet by mouth every evening. 100 Tablet 3    raloxifene (EVISTA) 60 MG Tab TAKE 1 TABLET BY MOUTH EVERY DAY 90 Tablet 3    amLODIPine (NORVASC) 2.5 MG Tab Take 1 Tablet by mouth every day. 90 Tablet 3    sertraline (ZOLOFT) 50 MG Tab Take 1 Tablet by mouth every day. 90 Tablet 3    NITRO-BID 2 % Ointment Place 0.5 Inches on the skin 4 times a day. On bottom      docusate sodium 100 MG Cap Take 100 mg by mouth 2 Times a Day. 60 capsule 1    polyethylene glycol/lytes (MIRALAX) 17 g Pack Take 1 Packet by mouth every day. (Patient taking differently: Take 17 g by mouth 2 times a day.) 30 Each 1    Turmeric Powder Take 1 Scoop by mouth every day.       No current [facility]-ordered facility-administered medications on file.     Ciprofloxacin, Pcn [penicillins], and Sulfa drugs    ROS: see hpi  Gen: no fevers/chills  Pulm: no sob, no cough  CV: no chest pain, no palpitations, no edema  GI: no nausea/vomiting, no diarrhea  Skin: no rash    Objective:   Exam:  BP (!) 128/90 (BP Location: Left arm, Patient Position: Sitting, BP Cuff Size: Adult)   Pulse 80   Temp 36.5 °C (97.7 °F) (Temporal)   Resp 18   Ht 1.575 m (5' 2\")   Wt 58.6 kg (129 lb 3.2 oz)   SpO2 100%   BMI 23.63 kg/m²    Body mass index is 23.63 kg/m².    Gen: Alert and oriented, No apparent distress.  HEENT: Head atraumatic, normocephalic. Pupils equal and round.   Neck: Neck is supple without lymphadenopathy. Muscle tension " palpated over left upper trapezius, Spurling test negative. CNI-XII intact.   Lungs: Normal effort, CTA bilaterally, no wheezes, rhonchi, or rales  CV: Regular rate and rhythm. No murmurs, rubs, or gallops.  ABD: +BS. Non-tender, non-distended. No rebound, rigidity, or guarding.  Ext: No clubbing, cyanosis, edema. Tenderness left mid thoracic, trapezius area.     Assessment & Plan:     67 y.o. female with the following -     1. Neck pain on left side  Acute and uncontrolled condition on ibuprofen which provides minimal relief. Patient presentation likely neck muscle strain. Neuro exam unremarkable. Will treat with flexeril for muscle spasms, side effect of drowsiness, lightheadedness, and dizziness discussed. Advised not to drive or drink ETOH with medication. Recommend conservative therapy such as applying ice pack and warm pack alternate in 20 min intervals PRN for relief. Recommend neck ROM stretches and exercise. Recommend massage therapy for muscle tension.   If symptoms do not improve with conservative therapy, advised to f/u with PT for treatments.  Discussed in details of alarm symptoms and for patient to go to the Emergency room if experiencing the following:   -confusion, amnesia, somnolence, mood or cognitive changes, sleep disturbances, severe headache, dizziness, loss of balance, delayed speech, memory deficits, seizures, syncope, vision changes, nausea, vomiting, weakness, numbness to any extremities, urinary or bowel incontinence, severe pain, chest pain, SOB, difficulty breathing.      - cyclobenzaprine (FLEXERIL) 5 mg tablet; Take 1-2 Tablets by mouth 3 times a day as needed for Muscle Spasms or Moderate Pain.  Dispense: 30 Tablet; Refill: 0  - Referral to Physical Therapy    2. Acute left-sided thoracic back pain  Acute and uncontrolled condition on ibuprofen which provides minimal relief. Patient presentation likely muscle strain. Discussed treating with flexeril for muscle spasms and tension. Med  instructions and side effects discussed. Recommend lidocaine patch to affected area ever 24 hours for relief. Recommend conservative therapy mentioned above. Provided patient with TOS exercise to do daily.  Recommend massage therapy for muscle tension. May take tylenol and ibuprofen as needed for pain relief, side effects discussed.   If symptoms do not improve with conservative therapy, advised to f/u with PT for treatments.    - cyclobenzaprine (FLEXERIL) 5 mg tablet; Take 1-2 Tablets by mouth 3 times a day as needed for Muscle Spasms or Moderate Pain.  Dispense: 30 Tablet; Refill: 0  - lidocaine (LIDODERM) 5 % Patch; Place 1 Patch on the skin every 24 hours.  Dispense: 10 Patch; Refill: 1  - Referral to Physical Therapy    3. Multiple thyroid nodules  Incidental finding. TSH euthyroid. Will order ultrasound for further evaluation per radiology recommendation.     - US-SOFT TISSUES OF HEAD - NECK; Future    Medical Decision Making/Course:  In the course of preparing for this visit with review of the pertinent past medical history, recent and past clinic visits, current medications, and performing chart, immunization, medical history and medication reconciliation, and in the further course of obtaining the current history pertinent to the clinic visit today, performing an exam and evaluation, ordering and independently evaluating labs, tests, and/or procedures, prescribing any recommended new medications as noted above, providing any pertinent counseling and education and recommending further coordination of care. This was discussed with patient in a shared-decision making conversation, and they understand and agreed with plan of care.     Return if symptoms worsen or fail to improve.    JESUS Torres.   [facility]    Please note that this dictation was created using voice recognition software. I have made every reasonable attempt to correct obvious errors, but I expect that there are  errors of grammar and possibly content that I did not discover before finalizing the note.

## 2022-12-28 ENCOUNTER — HOSPITAL ENCOUNTER (OUTPATIENT)
Facility: MEDICAL CENTER | Age: 67
End: 2022-12-30
Attending: EMERGENCY MEDICINE | Admitting: HOSPITALIST
Payer: MEDICARE

## 2022-12-28 ENCOUNTER — APPOINTMENT (OUTPATIENT)
Dept: RADIOLOGY | Facility: MEDICAL CENTER | Age: 67
End: 2022-12-28
Attending: EMERGENCY MEDICINE
Payer: MEDICARE

## 2022-12-28 DIAGNOSIS — R51.9 CHRONIC INTRACTABLE HEADACHE, UNSPECIFIED HEADACHE TYPE: ICD-10-CM

## 2022-12-28 DIAGNOSIS — G89.29 CHRONIC INTRACTABLE HEADACHE, UNSPECIFIED HEADACHE TYPE: ICD-10-CM

## 2022-12-28 DIAGNOSIS — S09.90XD CLOSED HEAD INJURY, SUBSEQUENT ENCOUNTER: ICD-10-CM

## 2022-12-28 DIAGNOSIS — M54.9 INTRACTABLE BACK PAIN: ICD-10-CM

## 2022-12-28 DIAGNOSIS — M54.6 ACUTE LEFT-SIDED THORACIC BACK PAIN: ICD-10-CM

## 2022-12-28 DIAGNOSIS — M54.2 NECK PAIN: ICD-10-CM

## 2022-12-28 LAB
ALBUMIN SERPL BCP-MCNC: 4.3 G/DL (ref 3.2–4.9)
ALBUMIN/GLOB SERPL: 1.7 G/DL
ALP SERPL-CCNC: 53 U/L (ref 30–99)
ALT SERPL-CCNC: 14 U/L (ref 2–50)
AMPHET UR QL SCN: NEGATIVE
ANION GAP SERPL CALC-SCNC: 9 MMOL/L (ref 7–16)
APPEARANCE UR: CLEAR
AST SERPL-CCNC: 15 U/L (ref 12–45)
BARBITURATES UR QL SCN: NEGATIVE
BASOPHILS # BLD AUTO: 0.2 % (ref 0–1.8)
BASOPHILS # BLD: 0.01 K/UL (ref 0–0.12)
BENZODIAZ UR QL SCN: NEGATIVE
BILIRUB SERPL-MCNC: 0.3 MG/DL (ref 0.1–1.5)
BILIRUB UR QL STRIP.AUTO: NEGATIVE
BUN SERPL-MCNC: 16 MG/DL (ref 8–22)
BZE UR QL SCN: NEGATIVE
CALCIUM ALBUM COR SERPL-MCNC: 9.3 MG/DL (ref 8.5–10.5)
CALCIUM SERPL-MCNC: 9.5 MG/DL (ref 8.4–10.2)
CANNABINOIDS UR QL SCN: POSITIVE
CHLORIDE SERPL-SCNC: 104 MMOL/L (ref 96–112)
CO2 SERPL-SCNC: 27 MMOL/L (ref 20–33)
COLOR UR: YELLOW
CREAT SERPL-MCNC: 0.67 MG/DL (ref 0.5–1.4)
EOSINOPHIL # BLD AUTO: 0.31 K/UL (ref 0–0.51)
EOSINOPHIL NFR BLD: 5.5 % (ref 0–6.9)
ERYTHROCYTE [DISTWIDTH] IN BLOOD BY AUTOMATED COUNT: 44 FL (ref 35.9–50)
ETHANOL BLD-MCNC: <10.1 MG/DL
GFR SERPLBLD CREATININE-BSD FMLA CKD-EPI: 96 ML/MIN/1.73 M 2
GLOBULIN SER CALC-MCNC: 2.6 G/DL (ref 1.9–3.5)
GLUCOSE SERPL-MCNC: 98 MG/DL (ref 65–99)
GLUCOSE UR STRIP.AUTO-MCNC: NEGATIVE MG/DL
HCT VFR BLD AUTO: 41.7 % (ref 37–47)
HGB BLD-MCNC: 14.2 G/DL (ref 12–16)
IMM GRANULOCYTES # BLD AUTO: 0.01 K/UL (ref 0–0.11)
IMM GRANULOCYTES NFR BLD AUTO: 0.2 % (ref 0–0.9)
KETONES UR STRIP.AUTO-MCNC: NEGATIVE MG/DL
LEUKOCYTE ESTERASE UR QL STRIP.AUTO: NEGATIVE
LYMPHOCYTES # BLD AUTO: 1.32 K/UL (ref 1–4.8)
LYMPHOCYTES NFR BLD: 23.6 % (ref 22–41)
MCH RBC QN AUTO: 30.9 PG (ref 27–33)
MCHC RBC AUTO-ENTMCNC: 34.1 G/DL (ref 33.6–35)
MCV RBC AUTO: 90.7 FL (ref 81.4–97.8)
METHADONE UR QL SCN: NEGATIVE
MICRO URNS: NORMAL
MONOCYTES # BLD AUTO: 0.45 K/UL (ref 0–0.85)
MONOCYTES NFR BLD AUTO: 8.1 % (ref 0–13.4)
NEUTROPHILS # BLD AUTO: 3.49 K/UL (ref 2–7.15)
NEUTROPHILS NFR BLD: 62.4 % (ref 44–72)
NITRITE UR QL STRIP.AUTO: NEGATIVE
NRBC # BLD AUTO: 0 K/UL
NRBC BLD-RTO: 0 /100 WBC
OPIATES UR QL SCN: NEGATIVE
OXYCODONE UR QL SCN: NEGATIVE
PCP UR QL SCN: NEGATIVE
PH UR STRIP.AUTO: 6 [PH] (ref 5–8)
PLATELET # BLD AUTO: 223 K/UL (ref 164–446)
PMV BLD AUTO: 9.9 FL (ref 9–12.9)
POTASSIUM SERPL-SCNC: 4.5 MMOL/L (ref 3.6–5.5)
PROPOXYPH UR QL SCN: NEGATIVE
PROT SERPL-MCNC: 6.9 G/DL (ref 6–8.2)
PROT UR QL STRIP: NEGATIVE MG/DL
RBC # BLD AUTO: 4.6 M/UL (ref 4.2–5.4)
RBC UR QL AUTO: NEGATIVE
SODIUM SERPL-SCNC: 140 MMOL/L (ref 135–145)
SP GR UR STRIP.AUTO: 1.02
WBC # BLD AUTO: 5.6 K/UL (ref 4.8–10.8)

## 2022-12-28 PROCEDURE — 99219 PR INITIAL OBSERVATION CARE,LEVL II: CPT | Performed by: HOSPITALIST

## 2022-12-28 PROCEDURE — 700102 HCHG RX REV CODE 250 W/ 637 OVERRIDE(OP): Performed by: HOSPITALIST

## 2022-12-28 PROCEDURE — 81003 URINALYSIS AUTO W/O SCOPE: CPT | Mod: XU

## 2022-12-28 PROCEDURE — G0378 HOSPITAL OBSERVATION PER HR: HCPCS

## 2022-12-28 PROCEDURE — 700111 HCHG RX REV CODE 636 W/ 250 OVERRIDE (IP): Performed by: EMERGENCY MEDICINE

## 2022-12-28 PROCEDURE — 80307 DRUG TEST PRSMV CHEM ANLYZR: CPT

## 2022-12-28 PROCEDURE — 82077 ASSAY SPEC XCP UR&BREATH IA: CPT

## 2022-12-28 PROCEDURE — 80053 COMPREHEN METABOLIC PANEL: CPT

## 2022-12-28 PROCEDURE — 70450 CT HEAD/BRAIN W/O DYE: CPT

## 2022-12-28 PROCEDURE — A9270 NON-COVERED ITEM OR SERVICE: HCPCS | Performed by: HOSPITALIST

## 2022-12-28 PROCEDURE — 96374 THER/PROPH/DIAG INJ IV PUSH: CPT

## 2022-12-28 PROCEDURE — 96375 TX/PRO/DX INJ NEW DRUG ADDON: CPT

## 2022-12-28 PROCEDURE — 85025 COMPLETE CBC W/AUTO DIFF WBC: CPT

## 2022-12-28 PROCEDURE — A9270 NON-COVERED ITEM OR SERVICE: HCPCS | Performed by: EMERGENCY MEDICINE

## 2022-12-28 PROCEDURE — 700102 HCHG RX REV CODE 250 W/ 637 OVERRIDE(OP): Performed by: EMERGENCY MEDICINE

## 2022-12-28 PROCEDURE — 36415 COLL VENOUS BLD VENIPUNCTURE: CPT

## 2022-12-28 PROCEDURE — 99285 EMERGENCY DEPT VISIT HI MDM: CPT

## 2022-12-28 RX ORDER — RALOXIFENE HYDROCHLORIDE 60 MG/1
60 TABLET, FILM COATED ORAL DAILY
Status: DISCONTINUED | OUTPATIENT
Start: 2022-12-29 | End: 2022-12-30 | Stop reason: HOSPADM

## 2022-12-28 RX ORDER — CYCLOBENZAPRINE HCL 10 MG
5 TABLET ORAL 3 TIMES DAILY PRN
Status: DISCONTINUED | OUTPATIENT
Start: 2022-12-28 | End: 2022-12-30 | Stop reason: HOSPADM

## 2022-12-28 RX ORDER — AMOXICILLIN 250 MG
2 CAPSULE ORAL 2 TIMES DAILY
Status: DISCONTINUED | OUTPATIENT
Start: 2022-12-28 | End: 2022-12-30 | Stop reason: HOSPADM

## 2022-12-28 RX ORDER — ONDANSETRON 2 MG/ML
4 INJECTION INTRAMUSCULAR; INTRAVENOUS EVERY 4 HOURS PRN
Status: DISCONTINUED | OUTPATIENT
Start: 2022-12-28 | End: 2022-12-30 | Stop reason: HOSPADM

## 2022-12-28 RX ORDER — KETOROLAC TROMETHAMINE 30 MG/ML
15 INJECTION, SOLUTION INTRAMUSCULAR; INTRAVENOUS ONCE
Status: COMPLETED | OUTPATIENT
Start: 2022-12-28 | End: 2022-12-28

## 2022-12-28 RX ORDER — HYDROCODONE BITARTRATE AND ACETAMINOPHEN 10; 325 MG/1; MG/1
1 TABLET ORAL EVERY 6 HOURS PRN
Status: DISCONTINUED | OUTPATIENT
Start: 2022-12-28 | End: 2022-12-30 | Stop reason: HOSPADM

## 2022-12-28 RX ORDER — METOCLOPRAMIDE HYDROCHLORIDE 5 MG/ML
10 INJECTION INTRAMUSCULAR; INTRAVENOUS ONCE
Status: COMPLETED | OUTPATIENT
Start: 2022-12-28 | End: 2022-12-28

## 2022-12-28 RX ORDER — POLYETHYLENE GLYCOL 3350 17 G/17G
1 POWDER, FOR SOLUTION ORAL
Status: DISCONTINUED | OUTPATIENT
Start: 2022-12-28 | End: 2022-12-30 | Stop reason: HOSPADM

## 2022-12-28 RX ORDER — HYDROMORPHONE HYDROCHLORIDE 1 MG/ML
0.5 INJECTION, SOLUTION INTRAMUSCULAR; INTRAVENOUS; SUBCUTANEOUS
Status: DISCONTINUED | OUTPATIENT
Start: 2022-12-28 | End: 2022-12-30 | Stop reason: HOSPADM

## 2022-12-28 RX ORDER — HYDROCODONE BITARTRATE AND ACETAMINOPHEN 5; 325 MG/1; MG/1
1 TABLET ORAL ONCE
Status: COMPLETED | OUTPATIENT
Start: 2022-12-28 | End: 2022-12-28

## 2022-12-28 RX ORDER — LIDOCAINE 50 MG/G
1 PATCH TOPICAL EVERY 24 HOURS
Status: DISCONTINUED | OUTPATIENT
Start: 2022-12-29 | End: 2022-12-30 | Stop reason: HOSPADM

## 2022-12-28 RX ORDER — AMLODIPINE BESYLATE 5 MG/1
2.5 TABLET ORAL DAILY
Status: DISCONTINUED | OUTPATIENT
Start: 2022-12-29 | End: 2022-12-30 | Stop reason: HOSPADM

## 2022-12-28 RX ORDER — ACETAMINOPHEN 325 MG/1
650 TABLET ORAL EVERY 6 HOURS PRN
Status: DISCONTINUED | OUTPATIENT
Start: 2022-12-28 | End: 2022-12-30 | Stop reason: HOSPADM

## 2022-12-28 RX ORDER — DIPHENHYDRAMINE HYDROCHLORIDE 50 MG/ML
25 INJECTION INTRAMUSCULAR; INTRAVENOUS ONCE
Status: COMPLETED | OUTPATIENT
Start: 2022-12-28 | End: 2022-12-28

## 2022-12-28 RX ORDER — ONDANSETRON 4 MG/1
4 TABLET, ORALLY DISINTEGRATING ORAL EVERY 4 HOURS PRN
Status: DISCONTINUED | OUTPATIENT
Start: 2022-12-28 | End: 2022-12-30 | Stop reason: HOSPADM

## 2022-12-28 RX ORDER — BISACODYL 10 MG
10 SUPPOSITORY, RECTAL RECTAL
Status: DISCONTINUED | OUTPATIENT
Start: 2022-12-28 | End: 2022-12-30 | Stop reason: HOSPADM

## 2022-12-28 RX ORDER — ATORVASTATIN CALCIUM 10 MG/1
10 TABLET, FILM COATED ORAL NIGHTLY
Status: DISCONTINUED | OUTPATIENT
Start: 2022-12-28 | End: 2022-12-30 | Stop reason: HOSPADM

## 2022-12-28 RX ADMIN — DIPHENHYDRAMINE HYDROCHLORIDE 25 MG: 50 INJECTION INTRAMUSCULAR; INTRAVENOUS at 21:45

## 2022-12-28 RX ADMIN — HYDROCODONE BITARTRATE AND ACETAMINOPHEN 1 TABLET: 5; 325 TABLET ORAL at 18:31

## 2022-12-28 RX ADMIN — ATORVASTATIN CALCIUM 10 MG: 10 TABLET, FILM COATED ORAL at 23:45

## 2022-12-28 RX ADMIN — KETOROLAC TROMETHAMINE 15 MG: 30 INJECTION, SOLUTION INTRAMUSCULAR; INTRAVENOUS at 21:42

## 2022-12-28 RX ADMIN — METOCLOPRAMIDE 10 MG: 5 INJECTION, SOLUTION INTRAMUSCULAR; INTRAVENOUS at 21:47

## 2022-12-28 ASSESSMENT — PAIN DESCRIPTION - PAIN TYPE
TYPE: ACUTE PAIN

## 2022-12-28 ASSESSMENT — FIBROSIS 4 INDEX: FIB4 SCORE: 1.39

## 2022-12-29 ENCOUNTER — APPOINTMENT (OUTPATIENT)
Dept: RADIOLOGY | Facility: MEDICAL CENTER | Age: 67
End: 2022-12-29
Attending: INTERNAL MEDICINE
Payer: MEDICARE

## 2022-12-29 DIAGNOSIS — M54.6 ACUTE THORACIC BACK PAIN, UNSPECIFIED BACK PAIN LATERALITY: ICD-10-CM

## 2022-12-29 DIAGNOSIS — M54.2 NECK PAIN ON LEFT SIDE: ICD-10-CM

## 2022-12-29 PROBLEM — S09.90XA TRAUMATIC HEAD INJURY LESS THAN 3 MONTHS AGO: Status: ACTIVE | Noted: 2022-12-29

## 2022-12-29 LAB
ANION GAP SERPL CALC-SCNC: 9 MMOL/L (ref 7–16)
BUN SERPL-MCNC: 17 MG/DL (ref 8–22)
CALCIUM SERPL-MCNC: 9.4 MG/DL (ref 8.4–10.2)
CHLORIDE SERPL-SCNC: 105 MMOL/L (ref 96–112)
CO2 SERPL-SCNC: 29 MMOL/L (ref 20–33)
CREAT SERPL-MCNC: 0.7 MG/DL (ref 0.5–1.4)
ERYTHROCYTE [DISTWIDTH] IN BLOOD BY AUTOMATED COUNT: 43.6 FL (ref 35.9–50)
GFR SERPLBLD CREATININE-BSD FMLA CKD-EPI: 94 ML/MIN/1.73 M 2
GLUCOSE SERPL-MCNC: 98 MG/DL (ref 65–99)
HCT VFR BLD AUTO: 43.4 % (ref 37–47)
HGB BLD-MCNC: 14.8 G/DL (ref 12–16)
MCH RBC QN AUTO: 30.7 PG (ref 27–33)
MCHC RBC AUTO-ENTMCNC: 34.1 G/DL (ref 33.6–35)
MCV RBC AUTO: 90 FL (ref 81.4–97.8)
PLATELET # BLD AUTO: 231 K/UL (ref 164–446)
PMV BLD AUTO: 9.5 FL (ref 9–12.9)
POTASSIUM SERPL-SCNC: 4.1 MMOL/L (ref 3.6–5.5)
RBC # BLD AUTO: 4.82 M/UL (ref 4.2–5.4)
SODIUM SERPL-SCNC: 143 MMOL/L (ref 135–145)
WBC # BLD AUTO: 6.9 K/UL (ref 4.8–10.8)

## 2022-12-29 PROCEDURE — 85027 COMPLETE CBC AUTOMATED: CPT

## 2022-12-29 PROCEDURE — 94760 N-INVAS EAR/PLS OXIMETRY 1: CPT

## 2022-12-29 PROCEDURE — G0378 HOSPITAL OBSERVATION PER HR: HCPCS

## 2022-12-29 PROCEDURE — 80048 BASIC METABOLIC PNL TOTAL CA: CPT

## 2022-12-29 PROCEDURE — 700117 HCHG RX CONTRAST REV CODE 255: Performed by: INTERNAL MEDICINE

## 2022-12-29 PROCEDURE — 70551 MRI BRAIN STEM W/O DYE: CPT

## 2022-12-29 PROCEDURE — A9270 NON-COVERED ITEM OR SERVICE: HCPCS | Performed by: HOSPITALIST

## 2022-12-29 PROCEDURE — 72156 MRI NECK SPINE W/O & W/DYE: CPT

## 2022-12-29 PROCEDURE — 700102 HCHG RX REV CODE 250 W/ 637 OVERRIDE(OP): Performed by: HOSPITALIST

## 2022-12-29 PROCEDURE — 36415 COLL VENOUS BLD VENIPUNCTURE: CPT

## 2022-12-29 PROCEDURE — 700101 HCHG RX REV CODE 250: Performed by: HOSPITALIST

## 2022-12-29 PROCEDURE — A9576 INJ PROHANCE MULTIPACK: HCPCS | Performed by: INTERNAL MEDICINE

## 2022-12-29 PROCEDURE — 99225 PR SUBSEQUENT OBSERVATION CARE,LEVEL II: CPT | Performed by: INTERNAL MEDICINE

## 2022-12-29 RX ORDER — DOCUSATE SODIUM 100 MG/1
100 CAPSULE, LIQUID FILLED ORAL 2 TIMES DAILY
COMMUNITY

## 2022-12-29 RX ORDER — TOLTERODINE 2 MG/1
2 CAPSULE, EXTENDED RELEASE ORAL DAILY
COMMUNITY
End: 2023-12-08

## 2022-12-29 RX ADMIN — GADOTERIDOL 10 ML: 279.3 INJECTION, SOLUTION INTRAVENOUS at 16:49

## 2022-12-29 RX ADMIN — SERTRALINE 50 MG: 50 TABLET, FILM COATED ORAL at 05:49

## 2022-12-29 RX ADMIN — AMLODIPINE BESYLATE 2.5 MG: 5 TABLET ORAL at 05:48

## 2022-12-29 RX ADMIN — HYDROCODONE BITARTRATE AND ACETAMINOPHEN 1 TABLET: 10; 325 TABLET ORAL at 19:14

## 2022-12-29 RX ADMIN — SENNOSIDES AND DOCUSATE SODIUM 2 TABLET: 50; 8.6 TABLET ORAL at 17:05

## 2022-12-29 RX ADMIN — RALOXIFENE 60 MG: 60 TABLET ORAL at 05:49

## 2022-12-29 RX ADMIN — LIDOCAINE 1 PATCH: 50 PATCH CUTANEOUS at 05:48

## 2022-12-29 RX ADMIN — ATORVASTATIN CALCIUM 10 MG: 10 TABLET, FILM COATED ORAL at 20:58

## 2022-12-29 ASSESSMENT — PAIN DESCRIPTION - PAIN TYPE
TYPE: ACUTE PAIN

## 2022-12-29 ASSESSMENT — LIFESTYLE VARIABLES
EVER FELT BAD OR GUILTY ABOUT YOUR DRINKING: NO
HAVE PEOPLE ANNOYED YOU BY CRITICIZING YOUR DRINKING: NO
ON A TYPICAL DAY WHEN YOU DRINK ALCOHOL HOW MANY DRINKS DO YOU HAVE: 0
TOTAL SCORE: 0
TOTAL SCORE: 0
AVERAGE NUMBER OF DAYS PER WEEK YOU HAVE A DRINK CONTAINING ALCOHOL: 0
HOW MANY TIMES IN THE PAST YEAR HAVE YOU HAD 5 OR MORE DRINKS IN A DAY: 0
EVER HAD A DRINK FIRST THING IN THE MORNING TO STEADY YOUR NERVES TO GET RID OF A HANGOVER: NO
CONSUMPTION TOTAL: NEGATIVE
HAVE YOU EVER FELT YOU SHOULD CUT DOWN ON YOUR DRINKING: NO
TOTAL SCORE: 0
ALCOHOL_USE: NO

## 2022-12-29 ASSESSMENT — COGNITIVE AND FUNCTIONAL STATUS - GENERAL
SUGGESTED CMS G CODE MODIFIER MOBILITY: CI
DAILY ACTIVITIY SCORE: 24
CLIMB 3 TO 5 STEPS WITH RAILING: A LITTLE
MOBILITY SCORE: 23
SUGGESTED CMS G CODE MODIFIER DAILY ACTIVITY: CH

## 2022-12-29 ASSESSMENT — ENCOUNTER SYMPTOMS
WEAKNESS: 0
HEADACHES: 1
FEVER: 0
INSOMNIA: 0
NAUSEA: 0
BACK PAIN: 1
SHORTNESS OF BREATH: 0
DIZZINESS: 1
DOUBLE VISION: 0
SORE THROAT: 0
COUGH: 0
PALPITATIONS: 0
MYALGIAS: 0
NECK PAIN: 1
BRUISES/BLEEDS EASILY: 0
BLURRED VISION: 0
VOMITING: 0
DEPRESSION: 0

## 2022-12-29 NOTE — PROGRESS NOTES
Hospital Medicine Daily Progress Note    Date of Service  12/29/2022    Chief Complaint  Worsening neck pain and headache following fall    Hospital Course  Funmilayo Egan is a 67 y.o. female with asthma/COPD, hyperlipidemia, hypertension, admitted 12/28/2022 with progressively worsening neck pain and headache with intermittent paresthesia of both arms predominantly on the left side following a recent head injury on 12/17 when she slipped on ice on her driveway hitting the back of her head against cement.  At the time of the fall, her head and spine CTs were negative for acute fractures or trauma.  Since her injury, she was prescribed Flexeril and sent to physical therapy with only mild improvement.  In the ED here, repeat CT of the head was negative.  Labs were unremarkable.  She was given analgesics.  Given intractable pain, MRI of the brain and cervical spine were ordered.    Interval Problem Update  12/29/2022 - I reviewed the patient's chart. There were no significant overnight events. Remains hemodynamically stable and afebrile. Stable on RA.  Labs remain unremarkable.  Urine drug screen was negative.  Urinalysis was clean.  Brain MRI and Cervical MRI are pending.    > I have personally seen and examined the patient today.  She is comfortable, neck pain and headaches are better.  No nausea, vomiting.  No focal neurologic deficits.  Denies any chest pain or shortness of breath.  She is able to walk without issues.      I have discussed this patient's plan of care and discharge plan at IDT rounds today with Case Management, Nursing, Nursing leadership, and other members of the IDT team.    Consultants/Specialty  None    Code Status  Full Code    Disposition  Patient is medically cleared pending Brain and C spine MRI  for discharge.   Anticipate discharge to to home with close outpatient follow-up.  I have placed the appropriate orders for post-discharge needs.    Review of Systems  ROS     Pertinent  positives/negatives as mentioned above.     A complete review of systems was personally done by me. All other systems were negative.       Physical Exam  Temp:  [35.6 °C (96.1 °F)-36.7 °C (98.1 °F)] 35.6 °C (96.1 °F)  Pulse:  [74-86] 81  Resp:  [15-18] 16  BP: (111-157)/(74-92) 140/83  SpO2:  [90 %-96 %] 92 %    Physical Exam  Vitals reviewed.   Constitutional:       General: She is not in acute distress.     Appearance: Normal appearance. She is normal weight. She is not ill-appearing or diaphoretic.   HENT:      Head: Normocephalic and atraumatic.      Right Ear: External ear normal.      Left Ear: External ear normal.      Mouth/Throat:      Mouth: Mucous membranes are moist.      Pharynx: No oropharyngeal exudate or posterior oropharyngeal erythema.   Eyes:      General: No scleral icterus.     Extraocular Movements: Extraocular movements intact.      Conjunctiva/sclera: Conjunctivae normal.      Pupils: Pupils are equal, round, and reactive to light.   Neck:      Comments: No nuchal rigidity  Cardiovascular:      Rate and Rhythm: Normal rate and regular rhythm.      Heart sounds: Normal heart sounds. No murmur heard.  Pulmonary:      Effort: Pulmonary effort is normal. No respiratory distress.      Breath sounds: Normal breath sounds. No stridor. No wheezing, rhonchi or rales.   Chest:      Chest wall: No tenderness.   Abdominal:      General: Bowel sounds are normal. There is no distension.      Palpations: Abdomen is soft. There is no mass.      Tenderness: There is no abdominal tenderness. There is no guarding or rebound.   Musculoskeletal:         General: No swelling. Normal range of motion.      Cervical back: Normal range of motion and neck supple. No rigidity. No muscular tenderness.      Right lower leg: No edema.      Left lower leg: No edema.   Lymphadenopathy:      Cervical: No cervical adenopathy.   Skin:     General: Skin is warm and dry.      Coloration: Skin is not jaundiced.      Findings: No  rash.   Neurological:      General: No focal deficit present.      Mental Status: She is alert and oriented to person, place, and time. Mental status is at baseline.      Cranial Nerves: No cranial nerve deficit.   Psychiatric:         Mood and Affect: Mood normal.         Behavior: Behavior normal.         Thought Content: Thought content normal.         Judgment: Judgment normal.       Fluids  No intake or output data in the 24 hours ending 12/29/22 1156    Laboratory  Recent Labs     12/28/22  1844 12/29/22  0333   WBC 5.6 6.9   RBC 4.60 4.82   HEMOGLOBIN 14.2 14.8   HEMATOCRIT 41.7 43.4   MCV 90.7 90.0   MCH 30.9 30.7   MCHC 34.1 34.1   RDW 44.0 43.6   PLATELETCT 223 231   MPV 9.9 9.5     Recent Labs     12/28/22  1844 12/29/22  0333   SODIUM 140 143   POTASSIUM 4.5 4.1   CHLORIDE 104 105   CO2 27 29   GLUCOSE 98 98   BUN 16 17   CREATININE 0.67 0.70   CALCIUM 9.5 9.4                   Imaging  CT-HEAD W/O   Final Result      No acute intracranial abnormality.         MR-BRAIN-W/O    (Results Pending)   MR-CERVICAL SPINE-WITH & W/O    (Results Pending)        Assessment/Plan  * Intractable back pain- (present on admission)  Assessment & Plan  -Repeat head CT was negative.  No focal neurologic deficits.  - Pain is better with Lidoderm patch, analgesics, and muscle relaxant.   -Awaiting brain and cervical spine MRI.  If negative, can discharge with analgesic regimen.    Traumatic head injury less than 3 months ago- (present on admission)  Assessment & Plan  -As described above.  Concussion component likely contributing for above.  Plan as above.    Dyslipidemia- (present on admission)  Assessment & Plan  -Continue atorvastatin    Essential hypertension- (present on admission)  Assessment & Plan  -Continue amlodipine         VTE prophylaxis: SCDs/TEDs

## 2022-12-29 NOTE — ED TRIAGE NOTES
"Pt comes in w/   c/o continued head pain s/p falling and hitting head on cement 11 days ago  was evaluated for this injury and no abnormal findings   continues to have issues w/ injury  H/V \"severe\"  at times nausea, pain to eyes and pain to back   pt A,A and X 3  speaking full sentence  has been seen by her PCP and was told to go to hospital for continued or worsening symptoms     " "Pt's wife Charlee reports pt's leg back is defective and she is wondering if they would be able to  a replacement to at the [facility] closest to them in [place]. Pt has never been seen at a [facility] and calling here due to pt's [facility] hospital stay.    Advised Charlee I do not have a back door number to [facility] and may be on hold for awhile.     Charlee states they will \"make do\" and pt will sit on toilet while she washes out large bag he uses. Agrees to follow up with clinic tomorrow.     Reason for Disposition    Nursing judgment    Protocols used: NO GUIDELINE OR REFERENCE [id number]      "

## 2022-12-29 NOTE — ED NOTES
Pharmacy Medication Reconciliation    ~Medication Reconciliation partially completed per patient at bedside  ~Allergies reviewed and updated  ~Patient home pharmacy :  [facility]    ~Med rec to follow up with patient home pharmacy when they reopen to verify current medications

## 2022-12-29 NOTE — ED NOTES
PT up dated on care plan at this time with no questions.  states that he will go home to eat but to call him with any changes. Pts call light in reach.

## 2022-12-29 NOTE — ASSESSMENT & PLAN NOTE
-Repeat head CT was negative.  No focal neurologic deficits.  - Pain is better with Lidoderm patch, analgesics, and muscle relaxant.   -Awaiting brain and cervical spine MRI.  If negative, can discharge with analgesic regimen.

## 2022-12-29 NOTE — PROGRESS NOTES
4 Eyes Skin Assessment Completed by Kitty RN and Khadijah RN.    Head WDL  Ears WDL  Nose WDL  Mouth WDL  Neck WDL  Breast/Chest WDL  Shoulder Blades WDL  Spine WDL  (R) Arm/Elbow/Hand WDL  (L) Arm/Elbow/Hand scratch,WILMER  Abdomen WDL  Groin WDL  Scrotum/Coccyx/Buttocks WDL  (R) Leg WDL  (L) Leg WDL  (R) Heel/Foot/Toe WDL  (L) Heel/Foot/Toe WDL          Devices In Places Blood Pressure Cuff and Pulse Ox      Interventions In Place NC W/Ear Foams    Possible Skin Injury No    Pictures Uploaded Into Epic N/A  Wound Consult Placed N/A  RN Wound Prevention Protocol Ordered No

## 2022-12-29 NOTE — PROGRESS NOTES
Received report from ER  Nurse at 1310. Patient to floor at 1427 via wheelchair. Admission care rendered. Placed patient comfortably on bed. Initial v/s taken by CNA. Patient is awake and alert .Reports pain to head and mid back and numbness to left arm. Basic assessment rendered.     1358- patient up to Bathroom,,standby assist had BM today    1600- Patient off to floor for MRI.Patient shifted to radiology via wheelchair    1704-patient back to floor

## 2022-12-29 NOTE — H&P
"Hospital Medicine History & Physical Note    Date of Service  12/28/2022    Primary Care Physician  Samina Mera M.D.    Consultants  None    Code Status  Full Code    Chief Complaint  Chief Complaint   Patient presents with    Head Injury     Fell and hit head on cement 11 days ago  possible LOC    was seen for this and CT was done  no abnormal findings  continues to have issues from fall  dizziness pain nausea and muscle   \"pain is unbearable\" has only been taking tylenol  not helping    Eye Pain     Eyes hurting s/p fall and when pain is getting bad        History of Presenting Illness  Funmilayo Egan is a 67 y.o. female who presented to the ER on 12/28/2022 for evaluation of worsening and ongoing neck pain and headache.  She has associated intermittent paresthesias both of her arms but more predominantly on the left side.  Patient had a recent head injury on 12/17.  She slipped on ice fell on her driveway hitting her head against a cement posteriorly.  She was seen in the emergency department that day in CT of the head and spine were negative for acute fracture or traumatic illness.    Ever since this injury, she was seen by her primary care on 12/22 and prescribed Flexeril and sent to physical therapy.  She reports mild improvement initially but over the past 2 days, pain is significantly worse to the point is hard for her to do things around.    Here in the ED, repeat CT of the head is negative for acute intracranial abnormalities.  She received Toradol, Norco and Benadryl    I discussed the plan of care with patient.    Review of Systems  Review of Systems   Constitutional:  Negative for fever.   HENT:  Negative for congestion and sore throat.    Eyes:  Negative for blurred vision and double vision.   Respiratory:  Negative for cough and shortness of breath.    Cardiovascular:  Negative for chest pain and palpitations.   Gastrointestinal:  Negative for nausea and vomiting.   Genitourinary:  Negative for " dysuria and urgency.   Musculoskeletal:  Positive for back pain and neck pain. Negative for myalgias.   Skin:  Negative for itching and rash.   Neurological:  Positive for dizziness and headaches. Negative for weakness.   Endo/Heme/Allergies:  Does not bruise/bleed easily.   Psychiatric/Behavioral:  Negative for depression. The patient does not have insomnia.      Past Medical History   has a past medical history of ASTHMA, Bowel habit changes, Chest discomfort (04/2019), COPD, Hyperlipidemia, Hypertension, OSTEOPOROSIS, and Shortness of breath (05/2019).    Surgical History   has a past surgical history that includes abdominal hysterectomy total; appendectomy; hemorrhoidectomy; other (2020); and hemorrhoidectomy (N/A, 4/19/2021).     Family History  family history includes Cancer in her father; Heart Disease in her brother; Heart Disease (age of onset: 65) in her mother.   Family history reviewed with patient. There is no family history that is pertinent to the chief complaint.     Social History   reports that she has quit smoking. Her smoking use included cigarettes. She has never used smokeless tobacco. She reports that she does not currently use alcohol after a past usage of about 2.4 oz per week. She reports current drug use. Drug: Oral.    Allergies  Allergies   Allergen Reactions    Pcn [Penicillins] Itching     Blisters    Sulfa Drugs Rash     7/15/19 welting urticaria/rash after taking Bactrim        Medications  Prior to Admission Medications   Prescriptions Last Dose Informant Patient Reported? Taking?   NITRO-BID 2 % Ointment   No No   Sig: Apply 0.5 inch twice daily to skin on bottom   Turmeric Powder  Patient Yes No   Sig: Take 1 Scoop by mouth every day.   amLODIPine (NORVASC) 2.5 MG Tab   No No   Sig: Take 1 Tablet by mouth every day.   atorvastatin (LIPITOR) 10 MG Tab   No No   Sig: Take 1 Tablet by mouth every evening.   cyclobenzaprine (FLEXERIL) 5 mg tablet   No No   Sig: Take 1-2 Tablets by  mouth 3 times a day as needed for Muscle Spasms or Moderate Pain.   docusate sodium 100 MG Cap  Patient No No   Sig: Take 100 mg by mouth 2 Times a Day.   lidocaine (LIDODERM) 5 % Patch   No No   Sig: Place 1 Patch on the skin every 24 hours.   polyethylene glycol/lytes (MIRALAX) 17 g Pack  Patient No No   Sig: Take 1 Packet by mouth every day.   Patient taking differently: Take 17 g by mouth 2 times a day.   raloxifene (EVISTA) 60 MG Tab   No No   Sig: TAKE 1 TABLET BY MOUTH EVERY DAY   sertraline (ZOLOFT) 50 MG Tab   No No   Sig: Take 1 Tablet by mouth every day.      Facility-Administered Medications: None       Physical Exam  Temp:  [36.7 °C (98.1 °F)] 36.7 °C (98.1 °F)  Pulse:  [75-86] 78  Resp:  [16-18] 16  BP: (133-151)/(88-91) 151/91  SpO2:  [95 %-96 %] 96 %  Blood Pressure : (!) 151/91   Temperature: 36.7 °C (98.1 °F)   Pulse: 78   Respiration: 16   Pulse Oximetry: 96 %       Physical Exam  Constitutional:       Appearance: Normal appearance.   HENT:      Head: Normocephalic and atraumatic.      Mouth/Throat:      Mouth: Mucous membranes are moist.      Pharynx: Oropharynx is clear.   Eyes:      Extraocular Movements: Extraocular movements intact.      Pupils: Pupils are equal, round, and reactive to light.   Cardiovascular:      Rate and Rhythm: Normal rate and regular rhythm.      Heart sounds: Normal heart sounds.   Pulmonary:      Effort: Pulmonary effort is normal.      Breath sounds: Normal breath sounds.   Abdominal:      General: Abdomen is flat. Bowel sounds are normal.      Palpations: Abdomen is soft.   Musculoskeletal:      Cervical back: Normal range of motion and neck supple.      Comments: Patient has moderate point tenderness in the posterior aspect of her neck and Spine   Skin:     General: Skin is warm and dry.   Neurological:      General: No focal deficit present.      Mental Status: She is alert and oriented to person, place, and time.      Comments: Speech is normal, cranial nerves  grossly intact.  Strength is normal for both upper and lower extremity.   Psychiatric:         Mood and Affect: Mood normal.         Behavior: Behavior normal.       Laboratory:  Recent Labs     12/28/22 1844   WBC 5.6   RBC 4.60   HEMOGLOBIN 14.2   HEMATOCRIT 41.7   MCV 90.7   MCH 30.9   MCHC 34.1   RDW 44.0   PLATELETCT 223   MPV 9.9     Recent Labs     12/28/22  1844   SODIUM 140   POTASSIUM 4.5   CHLORIDE 104   CO2 27   GLUCOSE 98   BUN 16   CREATININE 0.67   CALCIUM 9.5     Recent Labs     12/28/22  1844   ALTSGPT 14   ASTSGOT 15   ALKPHOSPHAT 53   TBILIRUBIN 0.3   GLUCOSE 98         No results for input(s): NTPROBNP in the last 72 hours.      No results for input(s): TROPONINT in the last 72 hours.    Imaging:  CT-HEAD W/O   Final Result      No acute intracranial abnormality.         MR-BRAIN-W/O    (Results Pending)   MR-CERVICAL SPINE-WITH & W/O    (Results Pending)           Assessment/Plan:  Justification for Admission Status  I anticipate this patient is appropriate for observation status at this time because 67-year-old female, intractable neck pain after ground-level fall and head injury 12 days ago with CT imaging negative for acute findings.         * Intractable back pain- (present on admission)  Assessment & Plan  - Observation status to medical floor.  -Patient with ongoing back pain, neck pain and headache after sustaining ground-level fall with head injury 12 days ago.  -She had ER evaluation at that time, had follow-up with PCP and in spite of all therapy symptoms are worsening.  -Repeat CT scan of the head tonight was negative for acute intracranial abnormalities.  -Given severity of her pain and somewhat recent history, I am adding MRI of the brain and cervical spine.  -Patient received Toradol, Benadryl and Norco in the emergency department with good results.  She will need pain control.    Traumatic head injury less than 3 months ago  Assessment & Plan  -As described above.  Concussion  component likely contributing for above.  Plan as above.    Dyslipidemia- (present on admission)  Assessment & Plan  -Continue atorvastatin    Essential hypertension- (present on admission)  Assessment & Plan  -Continue amlodipine        VTE prophylaxis: SCDs/TEDs

## 2022-12-29 NOTE — CARE PLAN
The patient is Stable - Low risk of patient condition declining or worsening    Shift Goals  Clinical Goals: patient will complain  mild pain only to head and back throughout the shift with PRN interventions  Patient Goals: pain mngt    Progress made toward(s) clinical / shift goals:  Patient is having mild pain as of the moment, rest promoted and requested a dim room. Declines pain meds as of now    Patient is not progressing towards the following goals:    Problem: Pain - Standard  Goal: Alleviation of pain or a reduction in pain to the patient’s comfort goal  Outcome: Progressing

## 2022-12-29 NOTE — ED NOTES
Pt rounded on at this time and white board updated. Pt requesting to use restroom. Pt up self and steady on feet. Pt declines any other needs at this time. Pt provide with tooth brush and tooth paste for oral care. Room cleaned and call light on bed where pt will be able to reach it.

## 2022-12-29 NOTE — ED PROVIDER NOTES
ED Physician Note    Chief Concern:   Headache, photophobia, neck pain    HPI:  Funmilayo Egan is a very pleasant 67-year-old woman who presents to the emergency department for evaluation of headache, and photophobia.  She slipped on ice and fell backward striking her head and neck 11 days ago.  She was seen in this emergency department, had a negative CT of the head, as well as a negative CT of the cervical spine.  However, she states since that time her headache has progressively worsened.  She followed up with her APRN in primary care clinic and was prescribed Flexeril which did not alleviate her symptoms.  She continues to have ongoing neck pain, she reports some paresthesias and tingling to both hands, though no weakness.  She also developed photophobia after returning home from the emergency department 11 days ago, which she states has progressively worsened.  Headache has been persistent, though is exacerbated by light, and alleviated though not resolved by an eye cover.  Photophobia is also significantly improved when covering her eyes.  She reports photophobia is associated with eye pain.  She does have a steady gait, has been able to ambulate, reports no weakness in the arms or legs, though at times she feels somewhat lightheaded.  She reports no associated chest pain, no shortness of breath.  She did land on her back, and has had some thoracic back pain since the time of the fall, she did have a negative CT of the thoracic spine at that time.  She is not currently on any anticoagulation, she reports she has not taken any aspirin or aspirin containing medications.  The only pain medication she is taking at home is Tylenol.    Review of Systems:  See HPI for pertinent positives and negatives. All other systems negative.    Past Medical History:   has a past medical history of ASTHMA, Bowel habit changes, Chest discomfort (04/2019), COPD, Hyperlipidemia, Hypertension, OSTEOPOROSIS, and Shortness of breath  "(05/2019).    Social History:  Social History     Tobacco Use    Smoking status: Former     Packs/day: 0.00     Types: Cigarettes    Smokeless tobacco: Never    Tobacco comments:     20 y.a   Vaping Use    Vaping Use: Never used   Substance and Sexual Activity    Alcohol use: Not Currently     Alcohol/week: 2.4 oz     Types: 4 Glasses of wine per week     Comment: stopped in 2020    Drug use: Yes     Types: Oral     Comment: 4/17/2021    Sexual activity: Never       Surgical History:   has a past surgical history that includes abdominal hysterectomy total; appendectomy; hemorrhoidectomy; other (2020); and hemorrhoidectomy (N/A, 4/19/2021).    Current Medications:  Home Medications    **Home medications have not yet been reviewed for this encounter**         Allergies:  Allergies   Allergen Reactions    Pcn [Penicillins] Itching     Blisters    Sulfa Drugs Rash     7/15/19 welting urticaria/rash after taking Bactrim        Physical Exam:  Vital Signs: BP (!) 150/78   Pulse 75   Temp 36.7 °C (98.1 °F) (Temporal)   Resp 16   Ht 1.575 m (5' 2\")   Wt 60.1 kg (132 lb 7.9 oz)   SpO2 91%   BMI 24.23 kg/m²   Constitutional: Alert, no acute distress  HENT: Normocephalic, mask in place  Neck: Midline tenderness to palpation along approximately C4-C6, no palpable deformity  Eyes: Pinpoint pupils, normal extraocular movements, no periorbital edema  Cardiovascular: Extremities are warm and well perfused, no murmur appreciated, normal cardiac auscultation  Pulmonary: No respiratory distress, normal work of breathing, no accessory muscule usage, breath sounds clear and equal bilaterally  Abdomen: Soft, non-distended, non-tender to palpation, no peritoneal signs  Skin: Warm, dry, no rashes or lesions  Musculoskeletal: Normal range of motion in all extremities, no swelling or deformity noted  Neurologic: Normal extraocular movements, no gaze deficit, normal finger-to-nose testing on the right, when asked to perform " finger-to-nose testing on the left she holds her finger up and states that she is now seeing double, though this is still in the center of her visual field, identical to hand position when right-sided finger-to-nose testing was performed.  She has no facial droop, no slurred speech, 5-5 upper and lower extremity strength, normal gait.  Psychiatric: Normal and appropriate mood and affect    Medical records reviewed for continuity of care. Ms. Egan was seen at the [facility] emergency department 12/17/2022.  Emergency physician record reviewed.Ms. Egan she was evaluated after a slip and fall, striking her head.  Reported possible loss of consciousness.  CT of the cervical spine demonstrates no acute fracture or listhesis.  CT head was negative for acute infarct, hemorrhage, or mass.  No history of anticoagulation is identified.  She then followed up at her primary care clinic 12/22/2022, APRN note reviewed from that visit.  She reported ongoing headaches as well as limited range of motion of the neck, she also reported nausea when her headaches became severe.  She was treated with Flexeril for her neck pain.  She was treated with Flexeril and lidocaine patches for left-sided thoracic back pain.  Headache was not addressed.    Most recent MRI of the cervical spine within our system is from 10/2/07.  This demonstrates a small central disc protrusion at C4-5 without significant central canal or foraminal stenosis.  Straightening of the cervical spine noted, likely reflecting muscular spasm.    Labs:  Labs Reviewed   URINE DRUG SCREEN - Abnormal; Notable for the following components:       Result Value    Cannabinoid Metab Positive (*)     All other components within normal limits   CBC WITH DIFFERENTIAL   COMP METABOLIC PANEL   URINALYSIS   ETHYL ALCOHOL (BLOOD)   CORRECTED CALCIUM   ESTIMATED GFR   DIAGNOSTIC ALCOHOL   CBC WITHOUT DIFFERENTIAL   BASIC METABOLIC PANEL       Radiology:  CT-HEAD W/O   Final Result      No  acute intracranial abnormality.         MR-BRAIN-W/O    (Results Pending)   MR-CERVICAL SPINE-WITH & W/O    (Results Pending)          Differential diagnosis:  Delayed intracranial bleed, concussion, cervical spine injury, musculoskeletal pain, intractable headaches    MDM:  Ms. Egan presents to the emergency department today with progressively worsening headache, as well as progressively worsening midline neck pain for the past 12 days after a fall on ice.  She initially had a negative head CT, followed up with her primary care clinic due to worsening pain, was treated with Flexeril and lidocaine patches which did not help her symptoms.  On arrival to the emergency department she has a somewhat inconsistent neurologic exam, with intermittent difficulty with finger-to-nose testing.  She does have midline cervical spine tenderness to palpation around C4-C6.    Lab work was obtained, diagnostic alcohol level is negative, CMP is entirely within normal limits, urine drug screen is positive for cannabinoids which she states she uses on the weekends only.  She has a normal white blood count, CBC and urinalysis are entirely within normal limits.    Head CT is negative for acute abnormality.    She continued to have intractable pain, was initially treated with hydrocodone, then treated with Reglan, Benadryl, and Toradol, though she continued to have ongoing neck pain.  Plan at this time is for admission for MRI of the cervical spine due to midline tenderness to palpation and history of trauma.  Case discussed with hospitalist who Agrees to admit patient.    Disposition:  Admit to hospitalist in stable condition.    Final Impression:  1. Closed head injury, subsequent encounter    2. Chronic intractable headache, unspecified headache type    3. Neck pain        Electronically signed by Vivien Mejia MD

## 2022-12-30 VITALS
HEART RATE: 80 BPM | TEMPERATURE: 97.9 F | OXYGEN SATURATION: 100 % | SYSTOLIC BLOOD PRESSURE: 128 MMHG | WEIGHT: 132.5 LBS | RESPIRATION RATE: 16 BRPM | DIASTOLIC BLOOD PRESSURE: 76 MMHG | HEIGHT: 62 IN | BODY MASS INDEX: 24.38 KG/M2

## 2022-12-30 PROCEDURE — G0378 HOSPITAL OBSERVATION PER HR: HCPCS

## 2022-12-30 PROCEDURE — 700102 HCHG RX REV CODE 250 W/ 637 OVERRIDE(OP): Performed by: HOSPITALIST

## 2022-12-30 PROCEDURE — 700101 HCHG RX REV CODE 250: Performed by: HOSPITALIST

## 2022-12-30 PROCEDURE — A9270 NON-COVERED ITEM OR SERVICE: HCPCS | Performed by: HOSPITALIST

## 2022-12-30 PROCEDURE — 99217 PR OBSERVATION CARE DISCHARGE: CPT | Performed by: INTERNAL MEDICINE

## 2022-12-30 RX ORDER — CYCLOBENZAPRINE HCL 5 MG
10 TABLET ORAL 3 TIMES DAILY PRN
Qty: 30 TABLET | Refills: 0 | Status: SHIPPED | OUTPATIENT
Start: 2022-12-30 | End: 2024-01-25 | Stop reason: SDUPTHER

## 2022-12-30 RX ORDER — LIDOCAINE 50 MG/G
1 PATCH TOPICAL EVERY 24 HOURS
Qty: 5 PATCH | Refills: 0 | Status: SHIPPED | OUTPATIENT
Start: 2022-12-30 | End: 2023-01-05 | Stop reason: SDUPTHER

## 2022-12-30 RX ADMIN — CYCLOBENZAPRINE 5 MG: 10 TABLET, FILM COATED ORAL at 07:21

## 2022-12-30 RX ADMIN — RALOXIFENE 60 MG: 60 TABLET ORAL at 05:24

## 2022-12-30 RX ADMIN — SENNOSIDES AND DOCUSATE SODIUM 2 TABLET: 50; 8.6 TABLET ORAL at 05:24

## 2022-12-30 RX ADMIN — HYDROCODONE BITARTRATE AND ACETAMINOPHEN 1 TABLET: 10; 325 TABLET ORAL at 07:23

## 2022-12-30 RX ADMIN — AMLODIPINE BESYLATE 2.5 MG: 5 TABLET ORAL at 05:25

## 2022-12-30 RX ADMIN — LIDOCAINE 1 PATCH: 50 PATCH CUTANEOUS at 05:26

## 2022-12-30 ASSESSMENT — PATIENT HEALTH QUESTIONNAIRE - PHQ9
1. LITTLE INTEREST OR PLEASURE IN DOING THINGS: NOT AT ALL
SUM OF ALL RESPONSES TO PHQ9 QUESTIONS 1 AND 2: 0
2. FEELING DOWN, DEPRESSED, IRRITABLE, OR HOPELESS: NOT AT ALL

## 2022-12-30 ASSESSMENT — PAIN DESCRIPTION - PAIN TYPE: TYPE: ACUTE PAIN

## 2022-12-30 NOTE — PROGRESS NOTES
Pt discharged with MD order. Discharge instructions, prescriptions and follow-up appointments reviewed. Pt verbalized understanding. Copies of discharge papers given, all questions answered. Escorted with hospital staff via wheelchair.

## 2022-12-30 NOTE — PROGRESS NOTES
Received bedside report from day shift RN Kitty.  Assumed pt care.   Pt seen AO4, pain reported at 6/10, medicated per mar. No nausea reported at this time.   POC discussed.   Safety and fall precautions in place.   Instructed pt to use call light, verbalized understanding.   Call light kept within reach.  No other needs at this time.   Will continue to monitor.

## 2022-12-30 NOTE — PROGRESS NOTES
Bedside report received from night RN. Assumed care of patient. Alert and oriented, able to make needs known. Daily plan of care discussed. Pt complains headache and back pain, medicated per MAR. Hourly rounding in place.

## 2022-12-30 NOTE — DISCHARGE PLANNING
ER CM spoke with pt AOX4 Very pleasant. She is a retired . She lives in a 1 story home .She has been using lidoderm patchs and voltaren for back. She has no DME or o2 at home. PCP Dr Samina Mera No HH . r[facility].   Care Transition Team Assessment    Information Source  Orientation Level: Oriented X4  Information Given By: Patient  Informant's Name: Funmilayo  Who is responsible for making decisions for patient? : Patient         Elopement Risk  Legal Hold: No  Ambulatory or Self Mobile in Wheelchair: Yes  Disoriented: No  Psychiatric Symptoms: None  History of Wandering: No  Elopement this Admit: No  Elopement Risk: Not at Risk for Elopement    Interdisciplinary Discharge Planning  Primary Care Physician: Dr Samina Mera  Lives with - Patient's Self Care Capacity: Spouse  Patient or legal guardian wants to designate a caregiver: No  Support Systems: Spouse / Significant Other  Housing / Facility: 1 Story House  Do You Take your Prescribed Medications Regularly: Yes  Able to Return to Previous ADL's: Yes  Assistance Needed: No  Durable Medical Equipment: Not Applicable    Discharge Preparedness  What is your plan after discharge?: Home with help  What are your discharge supports?: Spouse  Prior Functional Level: Ambulatory, Independent with Activities of Daily Living, Independent with Medication Management    Functional Assesment  Prior Functional Level: Ambulatory, Independent with Activities of Daily Living, Independent with Medication Management    Finances  Prescription Coverage: Yes (cVs so [place])    Vision / Hearing Impairment  Right Eye Vision: Wears Glasses  Left Eye Vision: Wears Glasses              Domestic Abuse  Have you ever been the victim of abuse or violence?: No  Physical Abuse or Sexual Abuse: No  Verbal Abuse or Emotional Abuse: No  Possible Abuse/Neglect Reported to:: Not Applicable              Anticipated Discharge Information  Discharge Disposition: Discharged to home/self care  (01)

## 2022-12-30 NOTE — DISCHARGE PLANNING
Case Management Discharge Planning    Admission Date: 12/28/2022  GMLOS:    ALOS: 0    6-Clicks ADL Score: 24  6-Clicks Mobility Score: 23      Anticipated Discharge Dispo: Discharge Disposition: Discharged to home/self care (01)    DME Needed: No    Action(s) Taken: Updated Provider/Nurse on Discharge Plan    No discharge needs noted.     Escalations Completed: None    Medically Clear: Yes    Next Steps: N/A    Barriers to Discharge: None

## 2022-12-30 NOTE — DISCHARGE SUMMARY
"Discharge Summary    CHIEF COMPLAINT ON ADMISSION  Chief Complaint   Patient presents with    Head Injury     Fell and hit head on cement 11 days ago  possible LOC    was seen for this and CT was done  no abnormal findings  continues to have issues from fall  dizziness pain nausea and muscle   \"pain is unbearable\" has only been taking tylenol  not helping    Eye Pain     Eyes hurting s/p fall and when pain is getting bad        Reason for Admission  Headache; Light Sensitivity; Facia*     Admission Date  12/28/2022    CODE STATUS  Full Code    HPI & HOSPITAL COURSE  Funmilayo Egan is a 67 y.o. female with asthma/COPD, hyperlipidemia, hypertension, admitted 12/28/2022 with progressively worsening neck pain and headache with intermittent paresthesia of both arms predominantly on the left side following a recent head injury on 12/17 when she slipped on ice on her driveway hitting the back of her head against cement.  At the time of the fall, her head and spine CTs were negative for acute fractures or trauma.  Since her injury, she was prescribed Flexeril and sent to physical therapy with only mild improvement.  In the ED here, repeat CT of the head was negative.  Labs were unremarkable.  She was given analgesics.  Given intractable pain, MRI of the brain and cervical spine were done both of which showed nothing acute. Urine drug screen was negative.  Urinalysis was clean.  She had no focal neurologic deficits.  She was ambulatory. She was placed on flexeril along with lidocaine patch with improvement in her neck and back pain which was felt to be due to muscle spasms.     She remained hemodynamically stable and afebrile. I have personally seen and examined the patient on the day of discharge. With her clinical improvement, she was deemed ready to discharge from the hospital as she did not have any further hospitalization needs. Patient felt comfortable going home. The discharge plan was discussed with the patient, with " which she was agreeable to.     Therefore, she is discharged in good and stable condition to home with close outpatient follow-up.      Discharge Date  12/30/2022      FOLLOW UP ITEMS POST DISCHARGE  - continue PRN flexeril, and lidocaine patch. She is also advised to use warm compress on the back and neck.   - follow-up with PCP.   - counseled to seek immediate medical attention, or return to the ED for recurrent or worsening symptoms.      DISCHARGE DIAGNOSES  Principal Problem:    Intractable neck and back pain due to muscle spasms POA: Yes  Active Problems:    Traumatic head injury less than 3 months ago POA: Yes    Essential hypertension POA: Yes    Dyslipidemia POA: Yes  Resolved Problems:    * No resolved hospital problems. *      FOLLOW UP  Future Appointments   Date Time Provider Department Center   1/5/2023 10:45 AM LAB [facility] None   1/9/2023  3:30 PM DOMINGO ACUÑA [address]   6/16/2023  3:00 PM Samina Mera M.D. [facility] None     No follow-up provider specified.    MEDICATIONS ON DISCHARGE     Medication List        CHANGE how you take these medications        Instructions   cyclobenzaprine 5 mg tablet  What changed: how much to take  Commonly known as: Flexeril   Take 2 Tablets by mouth 3 times a day as needed for Muscle Spasms or Moderate Pain.  Dose: 10 mg            CONTINUE taking these medications        Instructions   amLODIPine 2.5 MG Tabs  Commonly known as: NORVASC   Take 1 Tablet by mouth every day.  Dose: 2.5 mg     atorvastatin 10 MG Tabs  Commonly known as: LIPITOR   Take 1 Tablet by mouth every evening.  Dose: 10 mg     docusate sodium 100 MG Caps  Commonly known as: COLACE   Take 100 mg by mouth 2 times a day.  Dose: 100 mg     lidocaine 5 % Ptch  Commonly known as: LIDODERM   Place 1 Patch on the skin every 24 hours.  Dose: 1 Patch     raloxifene 60 MG Tabs  Commonly known as: EVISTA   TAKE 1 TABLET BY MOUTH EVERY DAY     sertraline 50 MG Tabs  Commonly known as:  Zoloft   Doctor's comments: DX: F63.3, F41.9  Take 1 Tablet by mouth every day.  Dose: 50 mg     tolterodine ER 2 MG Cp24  Commonly known as: DETROL-LA   Take 2 mg by mouth every day.  Dose: 2 mg     TURMERIC PO   Take 2 Tablets by mouth every morning.  Dose: 2 Tablet     Vitamin D-3 125 MCG (5000 UT) Tabs   Take 5,000 Units by mouth every 7 days. On Monday  Dose: 5,000 Units              Allergies  Allergies   Allergen Reactions    Sulfa Drugs Rash     7/15/19 welting urticaria/rash after taking Bactrim     Pcn [Penicillins] Itching     Blisters       DIET  Orders Placed This Encounter   Procedures    Diet Order Diet: Cardiac     Standing Status:   Standing     Number of Occurrences:   1     Order Specific Question:   Diet:     Answer:   Cardiac [6]       ACTIVITY  As tolerated.  Weight bearing as tolerated    CONSULTATIONS  None    PROCEDURES  None    LABORATORY  Lab Results   Component Value Date    SODIUM 143 12/29/2022    POTASSIUM 4.1 12/29/2022    CHLORIDE 105 12/29/2022    CO2 29 12/29/2022    GLUCOSE 98 12/29/2022    BUN 17 12/29/2022    CREATININE 0.70 12/29/2022        Lab Results   Component Value Date    WBC 6.9 12/29/2022    HEMOGLOBIN 14.8 12/29/2022    HEMATOCRIT 43.4 12/29/2022    PLATELETCT 231 12/29/2022        Total time of the discharge process = 38 minutes.

## 2022-12-30 NOTE — RESPIRATORY CARE
"   COPD EDUCATION by COPD CLINICAL EDUCATOR  12/30/2022 at 11:39 AM by Mirian Colon, RRT     Patient reviewed by COPD education team. Patient does not have a formal history or diagnosis of COPD and is a former social smoker over 30 yrs ago smoked maybe 10 years.  Therefore, patient does not qualify for the COPD program.     COPD Screen  COPD Risk Screening  Do you have a history of COPD?: No    COPD Assessment  COPD Clinical Specialists ONLY  COPD Education Initiated: Yes--Short Intervention (Pt denies COPD, quit smoking over 30yrs ago, only smoked socially at [facility]. No PFT and No respiratory inhalers used, pt did not want to do a bedside PFT due to Pain and not able to take in a big breath, said she will talke to PCP and do OP)  DME Company: None  Physician Name: Samina Mera M.D.  Pulmonologist Name: None  Referrals Initiated: Yes  Pulmonary Rehab: Declined  Smoking Cessation: N/A  Hospice: N/A  Home Health Care: N/A  [facility] Outreach: N/A  [facility]: N/A  [facility]: N/A  Private In-Home Care Agency: N/A  (OP) Pulmonary Function Testing: Yes    PFT Results    No results found for: PFT    Meds to Beds  Would the patient like to opt in for Bedside Medication Delivery at Discharge?: No     MY COPD ACTION PLAN     It is recommended that patients and physicians /healthcare providers complete this action plan together. This plan should be discussed at each physician visit and updated as needed.    The green, yellow and red zones show groups of symptoms of COPD. This list of symptoms is not comprehensive, and you may experience other symptoms. In the \"Actions\" column, your healthcare provider has recommended actions for you to take based on your symptoms.    Patient Name: Funmilayo Egan   YOB: 1955   Last Updated on:     Green Zone:  I am doing well today Actions     Usual activitiy and exercise level   Take daily medications     Usual amounts of cough and phlegm/mucus   " "Use oxygen as prescribed     Sleep well at night   Continue regular exercise/diet plan     Appetite is good   At all times avoid cigarette smoke, inhaled irritants     Daily Medications (these medications are taken every day):                Yellow Zone:  I am having a bad day or a COPD flare Actions     More breathless than usual   Continue daily medications     I have less energy for my daily activities   Use quick relief inhaler as ordered     Increased or thicker phlegm/mucus   Use oxygen as prescribed     Using quick relief inhaler/nebulizer more often   Get plenty of rest     Swelling of ankles more than usual   Use pursed lip breathing     More coughing than usual   At all times avoid cigarette smoke, inhaled irritants     I feel like I have a \"chest cold\"     Poor sleep and my symptoms woke me up     My appetite is not good     My medicine is not helping      Call provider immediately if symptoms don’t improve     Continue daily medications, add rescue medications:               Medications to be used during a flare up, (as Discussed with Provider):              Red Zone:  I need urgent medical care Actions     Severe shortness of breath even at rest   Call 911 or seek medical care immediately     Not able to do any activity because of breathing      Fever or shaking chills      Feeling confused or very drowsy       Chest pains      Coughing up blood                  "

## 2023-01-03 ENCOUNTER — PATIENT OUTREACH (OUTPATIENT)
Dept: MEDICAL GROUP | Facility: IMAGING CENTER | Age: 68
End: 2023-01-03
Payer: MEDICARE

## 2023-01-03 NOTE — PROGRESS NOTES
Subjective:     Funmilayo Egan is a 67 y.o. female who presents for Hospital Follow-up.    POST DISCHARGE CALL:  Discharge Date:12/30/2022   Date of Outreach Call: 1/3/2023 11:13 AM  Now that you're home, how are you doing? Good  Did you receive any new prescriptions? Yes  Were you able to fill those medications? Yes  How did you fill those prescriptions? Pharmacy  If not able to fill prescriptions, why? *    Do you have questions about your medications? No  Do you have a follow-up appointment scheduled?No  Any issues or paperwork you wish to discuss with your PCP? No  Does patient qualify for CCM Program? No  If patient qualifies, was CCM Program Introduced? *  If patient does not qualify, comment? *  Number of Attempts: 1  Current or previous attempts completed within two business days of discharge? Yes  Provided education regarding treatment plan, medication, self-management, ADLs? Yes  Has patient completed Advance Directive? If yes, advise them to bring to appointment. No  Care Manager phone number provided? No  Is there anything else I can help you with? No  Chief Complaint? worsening and ongoing neck pain and headache.  Admitting Dx? Traumatic head injury less than 3 months ago  Discharge Diagnosis? Traumatic head injury less than 3 months ago  Additional Comments? *    HPI:   Recently hospitalized for head pain.   Patient with history of hypertension, dyslipidemia, GERD, osteoporosis and prior memory issues.    Hospital admission 12/29-30, 2022.   Slipped on ice, fell and hit head on cement, possible LOC, but unclear.    Had head CT. Continued falls, dizziness, pain, and nausea. Bright lights hurt her eyes.   Had MRI brain- stable from prior. Cervical spine MRI- DJD.  Flexeril-at night time, lidoderm patch- helped.  Medications per hospital visit.  Muscle were tight. Neck tightness. Looking down with neck hurts her may cause headache.  Has felt light headed at times. Headache is getting gradually better.  Looking down to  something can provoke symptoms.  Headache front and low back head area.   No n/v. No f/c. No cp/cob.  Previously with numbness and tingling left side whole body, resolved after hospital visit. No weakness.  Prior history of urge incontinence.  Has had prior memory issues for which she was being evaluated but currently feels memory is worse.      Current medicines (including reconciliation performed today)  Current Outpatient Medications   Medication Sig Dispense Refill    cyclobenzaprine (FLEXERIL) 5 mg tablet Take 2 Tablets by mouth 3 times a day as needed for Muscle Spasms or Moderate Pain. 30 Tablet 0    lidocaine (LIDODERM) 5 % Patch Place 1 Patch on the skin every 24 hours. 5 Patch 0    TURMERIC PO Take 2 Tablets by mouth every morning.      Cholecalciferol (VITAMIN D-3) 125 MCG (5000 UT) Tab Take 5,000 Units by mouth every 7 days. On Monday      docusate sodium (COLACE) 100 MG Cap Take 100 mg by mouth 2 times a day.      tolterodine ER (DETROL-LA) 2 MG CAPSULE SR 24 HR Take 2 mg by mouth every day.      atorvastatin (LIPITOR) 10 MG Tab Take 1 Tablet by mouth every evening. 100 Tablet 3    raloxifene (EVISTA) 60 MG Tab TAKE 1 TABLET BY MOUTH EVERY DAY 90 Tablet 3    amLODIPine (NORVASC) 2.5 MG Tab Take 1 Tablet by mouth every day. 90 Tablet 3    sertraline (ZOLOFT) 50 MG Tab Take 1 Tablet by mouth every day. 90 Tablet 3     No current facility-administered medications for this visit.       Allergies:   Sulfa drugs and Pcn [penicillins]    Social History     Tobacco Use    Smoking status: Former     Packs/day: 0.00     Types: Cigarettes    Smokeless tobacco: Never    Tobacco comments:     20 y.a   Vaping Use    Vaping Use: Never used   Substance Use Topics    Alcohol use: Not Currently     Alcohol/week: 2.4 oz     Types: 4 Glasses of wine per week     Comment: stopped in 2020    Drug use: Yes     Types: Oral     Comment: 4/17/2021       ROS:  Review of Systems   Constitutional: Negative  "for fever, chills and malaise/fatigue.   HENT: Negative for congestion.    Eyes: as above  Respiratory: Negative for cough and shortness of breath.    Cardiovascular: Negative for leg swelling.   Gastrointestinal: Negative for nausea, vomiting, abdominal pain and diarrhea.   Genitourinary: Negative for dysuria and hematuria.   Skin: Negative for rash.   Neurological: as above  Endo/Heme/Allergies: Does not bruise/bleed easily.   Psychiatric/Behavioral: Negative for depression.  The patient is not nervous/anxious.        Objective:     /80 (BP Location: Left arm, Patient Position: Sitting, BP Cuff Size: Adult)   Pulse 86   Temp 36.2 °C (97.2 °F) (Temporal)   Resp 17   Ht 1.575 m (5' 2\")   Wt 60.1 kg (132 lb 9.6 oz)   SpO2 96%   BMI 24.25 kg/m²       Physical Exam:  Constitutional: She appears well-developed and well-nourished. She appears not diaphoretic. No distress.   HENT: Right Ear: External ear normal. Left Ear: External ear normal. Tympanic membranes clear and intact.   Nose: Nose normal.   Mouth/Throat: Oropharynx is clear and moist. No oropharyngeal exudate.     Eyes: Conjunctivae and extraocular motions are normal. Pupils are equal, round, and reactive to light. No scleral icterus.   Neck: Normal range of motion. Neck supple. No thyromegaly present.   Cardiovascular: Normal rate, regular rhythm, normal heart sounds and intact distal pulses.  Exam reveals no gallop and no friction rub.  No murmur heard. No carotid bruits.   Pulmonary/Chest: Effort normal and breath sounds normal. No respiratory distress. She has no wheezes. She has no rales.   Abdominal: Soft. Bowel sounds are normal. She exhibits no distension and no mass. No tenderness. She has no rebound and no guarding.   Lymphadenopathy:  She has no cervical adenopathy.   Neurological: She is alert. She has normal reflexes. No cranial nerve deficit. She exhibits normal muscle tone. Negative rhomberg.   3/3 recall. During finger to nose test- " notes slight double vision of finger.    Skin: Skin is warm and dry. No rash noted. She is not diaphoretic. No erythema.   Psychiatric: She has a normal mood and affect. Her behavior is normal.   Musculoskeletal: She exhibits no edema. Full strength throughout. 2+ DTR throughout.   Back: right side upper back region with TTP. No spinal TTP.       Assessment and Plan:     1. Hospital discharge follow-up- stable, improving.         2. Concussion with unknown loss of consciousness status, subsequent encounter -appears stable.   Advised modification of activities. Follow up with neurology. Referral to Neurology      3. History of head injury - prior fall, slipped on ice. As above.  Referral to Neurology      4. Memory problem - noted prior to fall and was undergoing evaluation. Has had brain MRI completed. Likely multifactorial at this time.   Referral to neurology.  Referral to Neurology      5. Double vision- mild, improving.   During finger to nose test, noted double vision of finger. However, notes in hospital saw double vision of her , but that has resolved. Overall improving. Monitor. Referral to neurology.  Referral to Neurology      6. Acute left-sided thoracic back pain- stable, improving. Continue medication as needed.   lidocaine (LIDODERM) 5 % Patch      7. Back spasm - as above. She will plan to schedule with physical therapy.        8. Urge incontinence of urine - overall stable, started on detrol LA. Monitor.     9.      Headache- stable, improving. Prior injury, see #2 and 3. Component appears due to cervical etiology as well and prior injury.  She will plan to follow up with physical therapy.    - Chart and discharge summary were reviewed.   - Hospitalization and results reviewed with patient.   - Medications reviewed including instructions regarding high risk medications, dosing and side effects.  - Recommended Services: No services needed at this time  - Advance directive/POLST on file?  No      Follow-up:Return in about 2 weeks (around 1/19/2023). Follow up sooner as needed.     Face-to-face transitional care management services with HIGH (today's visit is within days post discharge & LACE+ score 59+) medical decision complexity were provided.     LACE+ Historical Score Over Time (0-28: Low, 29-58: Medium, 59+: High): 27

## 2023-01-03 NOTE — PROGRESS NOTES
TCM complete.  Two messages were left for the patient with the information on the appointment with her PCP.

## 2023-01-05 ENCOUNTER — OFFICE VISIT (OUTPATIENT)
Dept: MEDICAL GROUP | Facility: IMAGING CENTER | Age: 68
End: 2023-01-05
Payer: MEDICARE

## 2023-01-05 ENCOUNTER — HOSPITAL ENCOUNTER (OUTPATIENT)
Dept: LAB | Facility: MEDICAL CENTER | Age: 68
End: 2023-01-05
Attending: FAMILY MEDICINE
Payer: MEDICARE

## 2023-01-05 VITALS
RESPIRATION RATE: 17 BRPM | DIASTOLIC BLOOD PRESSURE: 80 MMHG | OXYGEN SATURATION: 96 % | WEIGHT: 132.6 LBS | SYSTOLIC BLOOD PRESSURE: 132 MMHG | HEART RATE: 86 BPM | TEMPERATURE: 97.2 F | BODY MASS INDEX: 24.4 KG/M2 | HEIGHT: 62 IN

## 2023-01-05 DIAGNOSIS — Z87.828 HISTORY OF HEAD INJURY: ICD-10-CM

## 2023-01-05 DIAGNOSIS — I10 ESSENTIAL HYPERTENSION: ICD-10-CM

## 2023-01-05 DIAGNOSIS — Z09 HOSPITAL DISCHARGE FOLLOW-UP: ICD-10-CM

## 2023-01-05 DIAGNOSIS — N39.41 URGE INCONTINENCE OF URINE: ICD-10-CM

## 2023-01-05 DIAGNOSIS — R51.9 NONINTRACTABLE HEADACHE, UNSPECIFIED CHRONICITY PATTERN, UNSPECIFIED HEADACHE TYPE: ICD-10-CM

## 2023-01-05 DIAGNOSIS — R73.01 IFG (IMPAIRED FASTING GLUCOSE): ICD-10-CM

## 2023-01-05 DIAGNOSIS — M54.6 ACUTE LEFT-SIDED THORACIC BACK PAIN: ICD-10-CM

## 2023-01-05 DIAGNOSIS — R41.3 MEMORY PROBLEM: ICD-10-CM

## 2023-01-05 DIAGNOSIS — S06.0XAD CONCUSSION WITH UNKNOWN LOSS OF CONSCIOUSNESS STATUS, SUBSEQUENT ENCOUNTER: ICD-10-CM

## 2023-01-05 DIAGNOSIS — M62.830 BACK SPASM: ICD-10-CM

## 2023-01-05 DIAGNOSIS — H53.2 DOUBLE VISION: ICD-10-CM

## 2023-01-05 DIAGNOSIS — E78.5 DYSLIPIDEMIA: ICD-10-CM

## 2023-01-05 DIAGNOSIS — D72.819 LEUKOPENIA, UNSPECIFIED TYPE: ICD-10-CM

## 2023-01-05 LAB
ALBUMIN SERPL BCP-MCNC: 4.5 G/DL (ref 3.2–4.9)
ALBUMIN/GLOB SERPL: 1.9 G/DL
ALP SERPL-CCNC: 49 U/L (ref 30–99)
ALT SERPL-CCNC: 16 U/L (ref 2–50)
ANION GAP SERPL CALC-SCNC: 8 MMOL/L (ref 7–16)
AST SERPL-CCNC: 20 U/L (ref 12–45)
BASOPHILS # BLD AUTO: 0.4 % (ref 0–1.8)
BASOPHILS # BLD: 0.02 K/UL (ref 0–0.12)
BILIRUB SERPL-MCNC: 0.4 MG/DL (ref 0.1–1.5)
BUN SERPL-MCNC: 11 MG/DL (ref 8–22)
CALCIUM ALBUM COR SERPL-MCNC: 8.7 MG/DL (ref 8.5–10.5)
CALCIUM SERPL-MCNC: 9.1 MG/DL (ref 8.5–10.5)
CHLORIDE SERPL-SCNC: 104 MMOL/L (ref 96–112)
CHOLEST SERPL-MCNC: 193 MG/DL (ref 100–199)
CO2 SERPL-SCNC: 29 MMOL/L (ref 20–33)
CREAT SERPL-MCNC: 0.7 MG/DL (ref 0.5–1.4)
EOSINOPHIL # BLD AUTO: 0.28 K/UL (ref 0–0.51)
EOSINOPHIL NFR BLD: 5.6 % (ref 0–6.9)
ERYTHROCYTE [DISTWIDTH] IN BLOOD BY AUTOMATED COUNT: 45.2 FL (ref 35.9–50)
EST. AVERAGE GLUCOSE BLD GHB EST-MCNC: 100 MG/DL
FASTING STATUS PATIENT QL REPORTED: NORMAL
GFR SERPLBLD CREATININE-BSD FMLA CKD-EPI: 94 ML/MIN/1.73 M 2
GLOBULIN SER CALC-MCNC: 2.4 G/DL (ref 1.9–3.5)
GLUCOSE SERPL-MCNC: 95 MG/DL (ref 65–99)
HBA1C MFR BLD: 5.1 % (ref 4–5.6)
HCT VFR BLD AUTO: 40.7 % (ref 37–47)
HDLC SERPL-MCNC: 50 MG/DL
HGB BLD-MCNC: 14.1 G/DL (ref 12–16)
IMM GRANULOCYTES # BLD AUTO: 0.01 K/UL (ref 0–0.11)
IMM GRANULOCYTES NFR BLD AUTO: 0.2 % (ref 0–0.9)
LDLC SERPL CALC-MCNC: 121 MG/DL
LYMPHOCYTES # BLD AUTO: 1.33 K/UL (ref 1–4.8)
LYMPHOCYTES NFR BLD: 26.5 % (ref 22–41)
MCH RBC QN AUTO: 31.7 PG (ref 27–33)
MCHC RBC AUTO-ENTMCNC: 34.6 G/DL (ref 33.6–35)
MCV RBC AUTO: 91.5 FL (ref 81.4–97.8)
MONOCYTES # BLD AUTO: 0.35 K/UL (ref 0–0.85)
MONOCYTES NFR BLD AUTO: 7 % (ref 0–13.4)
NEUTROPHILS # BLD AUTO: 3.03 K/UL (ref 2–7.15)
NEUTROPHILS NFR BLD: 60.3 % (ref 44–72)
NRBC # BLD AUTO: 0 K/UL
NRBC BLD-RTO: 0 /100 WBC
PLATELET # BLD AUTO: 233 K/UL (ref 164–446)
PMV BLD AUTO: 10.3 FL (ref 9–12.9)
POTASSIUM SERPL-SCNC: 4.2 MMOL/L (ref 3.6–5.5)
PROT SERPL-MCNC: 6.9 G/DL (ref 6–8.2)
RBC # BLD AUTO: 4.45 M/UL (ref 4.2–5.4)
SODIUM SERPL-SCNC: 141 MMOL/L (ref 135–145)
TRIGL SERPL-MCNC: 110 MG/DL (ref 0–149)
WBC # BLD AUTO: 5 K/UL (ref 4.8–10.8)

## 2023-01-05 PROCEDURE — 85025 COMPLETE CBC W/AUTO DIFF WBC: CPT

## 2023-01-05 PROCEDURE — 80061 LIPID PANEL: CPT

## 2023-01-05 PROCEDURE — 83036 HEMOGLOBIN GLYCOSYLATED A1C: CPT

## 2023-01-05 PROCEDURE — 99495 TRANSJ CARE MGMT MOD F2F 14D: CPT | Performed by: FAMILY MEDICINE

## 2023-01-05 PROCEDURE — 80053 COMPREHEN METABOLIC PANEL: CPT

## 2023-01-05 PROCEDURE — 36415 COLL VENOUS BLD VENIPUNCTURE: CPT

## 2023-01-05 RX ORDER — LIDOCAINE 50 MG/G
1 PATCH TOPICAL EVERY 24 HOURS
Qty: 5 PATCH | Refills: 0 | Status: SHIPPED | OUTPATIENT
Start: 2023-01-05 | End: 2023-01-26 | Stop reason: SDUPTHER

## 2023-01-05 ASSESSMENT — PAIN SCALES - GENERAL: PAINLEVEL: NO PAIN

## 2023-01-05 ASSESSMENT — PATIENT HEALTH QUESTIONNAIRE - PHQ9: CLINICAL INTERPRETATION OF PHQ2 SCORE: 0

## 2023-01-05 ASSESSMENT — FIBROSIS 4 INDEX: FIB4 SCORE: 1.16

## 2023-01-09 ENCOUNTER — APPOINTMENT (OUTPATIENT)
Dept: RADIOLOGY | Facility: MEDICAL CENTER | Age: 68
End: 2023-01-09
Payer: MEDICARE

## 2023-01-12 ENCOUNTER — TELEPHONE (OUTPATIENT)
Dept: HEALTH INFORMATION MANAGEMENT | Facility: OTHER | Age: 68
End: 2023-01-12
Payer: MEDICARE

## 2023-01-13 ENCOUNTER — TELEPHONE (OUTPATIENT)
Dept: HEALTH INFORMATION MANAGEMENT | Facility: OTHER | Age: 68
End: 2023-01-13
Payer: MEDICARE

## 2023-01-26 ENCOUNTER — OFFICE VISIT (OUTPATIENT)
Dept: MEDICAL GROUP | Facility: IMAGING CENTER | Age: 68
End: 2023-01-26
Payer: MEDICARE

## 2023-01-26 VITALS
OXYGEN SATURATION: 97 % | BODY MASS INDEX: 24.14 KG/M2 | RESPIRATION RATE: 17 BRPM | HEIGHT: 62 IN | WEIGHT: 131.2 LBS | TEMPERATURE: 97 F | SYSTOLIC BLOOD PRESSURE: 128 MMHG | HEART RATE: 78 BPM | DIASTOLIC BLOOD PRESSURE: 70 MMHG

## 2023-01-26 DIAGNOSIS — R51.9 NONINTRACTABLE HEADACHE, UNSPECIFIED CHRONICITY PATTERN, UNSPECIFIED HEADACHE TYPE: ICD-10-CM

## 2023-01-26 DIAGNOSIS — M62.830 BACK SPASM: ICD-10-CM

## 2023-01-26 DIAGNOSIS — S06.0XAD CONCUSSION WITH UNKNOWN LOSS OF CONSCIOUSNESS STATUS, SUBSEQUENT ENCOUNTER: ICD-10-CM

## 2023-01-26 DIAGNOSIS — Z87.828 HISTORY OF HEAD INJURY: ICD-10-CM

## 2023-01-26 DIAGNOSIS — M25.559 HIP PAIN: ICD-10-CM

## 2023-01-26 DIAGNOSIS — M54.2 NECK PAIN ON LEFT SIDE: ICD-10-CM

## 2023-01-26 DIAGNOSIS — E04.1 THYROID NODULE: ICD-10-CM

## 2023-01-26 DIAGNOSIS — M54.6 LEFT-SIDED THORACIC BACK PAIN, UNSPECIFIED CHRONICITY: ICD-10-CM

## 2023-01-26 DIAGNOSIS — H53.2 DOUBLE VISION: ICD-10-CM

## 2023-01-26 PROCEDURE — 99214 OFFICE O/P EST MOD 30 MIN: CPT | Performed by: FAMILY MEDICINE

## 2023-01-26 RX ORDER — HYDROCORTISONE 25 MG/G
1 CREAM TOPICAL 2 TIMES DAILY
Qty: 28 G | Refills: 0 | Status: SHIPPED | OUTPATIENT
Start: 2023-01-26

## 2023-01-26 RX ORDER — LIDOCAINE 50 MG/G
1 PATCH TOPICAL
Qty: 30 PATCH | Refills: 2 | Status: SHIPPED | OUTPATIENT
Start: 2023-01-26 | End: 2024-01-25

## 2023-01-26 ASSESSMENT — PAIN SCALES - GENERAL: PAINLEVEL: NO PAIN

## 2023-01-26 ASSESSMENT — FIBROSIS 4 INDEX: FIB4 SCORE: 1.44

## 2023-01-26 NOTE — PROGRESS NOTES
"  SUBJECTIVE:    Chief Complaint   Patient presents with    Hip Pain     Pt states she has bilateral buckling on the hips and pain. She states this has always happened it just has gotten worse.     Other     She is feeling better from her head since her fall.       HPI:     Funmilayo Egan is a 67 y.o. female here here for follow-up of prior concussion and back pain symptoms.    Prior headaches have improved from before.  Notes mild headache at times.  If she puts her head down notes headache symptoms.  Double vision-slightly better.  Saw her eye doctor who recommended she follow-up with neurology.    She also feels the Lidoderm patch has been helping her back and hip pain.  Lately posterior hip pain more so on the right side.  Had prior x-rays of hips which were negative.  States she was putting on make up and the right hip \"grabbed\" her.  Lidoderm patch helped.  Request refill of Lidoderm.  Had prior physical therapy. Then fell- for neck now, awaiting, likely in February.   Used muscle relaxer for back spasm. Stable.   Neurology in June/July.     Notes there were some thyroid nodules on prior imaging and was advised to follow-up.     ROS:  No recent fevers or chills. No nausea or vomiting. No diarrhea. No chest pains or shortness of breath. No lower extremity edema.    Current Outpatient Medications on File Prior to Visit   Medication Sig Dispense Refill    lidocaine (LIDODERM) 5 % Patch Place 1 Patch on the skin every 24 hours. 5 Patch 0    cyclobenzaprine (FLEXERIL) 5 mg tablet Take 2 Tablets by mouth 3 times a day as needed for Muscle Spasms or Moderate Pain. 30 Tablet 0    TURMERIC PO Take 2 Tablets by mouth every morning.      Cholecalciferol (VITAMIN D-3) 125 MCG (5000 UT) Tab Take 5,000 Units by mouth every 7 days. On Monday      docusate sodium (COLACE) 100 MG Cap Take 100 mg by mouth 2 times a day.      tolterodine ER (DETROL-LA) 2 MG CAPSULE SR 24 HR Take 2 mg by mouth every day.      atorvastatin " "(LIPITOR) 10 MG Tab Take 1 Tablet by mouth every evening. 100 Tablet 3    raloxifene (EVISTA) 60 MG Tab TAKE 1 TABLET BY MOUTH EVERY DAY 90 Tablet 3    amLODIPine (NORVASC) 2.5 MG Tab Take 1 Tablet by mouth every day. 90 Tablet 3    sertraline (ZOLOFT) 50 MG Tab Take 1 Tablet by mouth every day. 90 Tablet 3     No current facility-administered medications on file prior to visit.       Allergies   Allergen Reactions    Sulfa Drugs Rash     7/15/19 welting urticaria/rash after taking Bactrim     Pcn [Penicillins] Itching     Blisters       Patient Active Problem List    Diagnosis Date Noted    Traumatic head injury less than 3 months ago 12/29/2022    Intractable neck and back pain due to muscle spasms 12/28/2022    Bilateral foot pain 05/17/2022    Scalp irritation 05/17/2022    Gastroesophageal reflux disease without esophagitis 09/10/2021    Generalized abdominal pain 12/21/2020    Urge incontinence of urine 04/12/2019    Hair pulling 03/12/2019    Essential hypertension 03/12/2019    Age-related osteoporosis without current pathological fracture 03/12/2019    Dyslipidemia 03/12/2019       Past Medical History:   Diagnosis Date    ASTHMA     remote hx    Bowel habit changes     Chest discomfort 04/2019    Coronary CT scan with LAD 11.4.     COPD     mild, not on meds for it    Hyperlipidemia     Hypertension     OSTEOPOROSIS     Shortness of breath 05/2019    Echocardiogram with normal LV size, LVEF 65%.Trace MR. Mild TR. RVSP 30mmHg.         OBJECTIVE:   /70 (BP Location: Left arm, Patient Position: Sitting, BP Cuff Size: Adult)   Pulse 78   Temp 36.1 °C (97 °F) (Temporal)   Resp 17   Ht 1.575 m (5' 2\")   Wt 59.5 kg (131 lb 3.2 oz)   SpO2 97%   BMI 24.00 kg/m²   General: Well-developed well-nourished female, no acute distress  HEENT: PERRL, EOMI  Neck: supple, no lymphadenopathy- cervical or supraclavicular, no thyromegaly  Cardiovascular: regular rate and rhythm, no murmurs, gallops, rubs  Lungs: " clear to auscultation bilaterally, no wheezes, crackles, or rhonchi  Abdomen: +bowel sounds, soft, nontender, nondistended, no rebound, no guarding, no hepatosplenomegaly  Extremities: no cyanosis, clubbing, edema  Back: No spinal tenderness to palpation.  Mild paraspinal tenderness to palpation of posterior lower lumbar areas.  Skin: Warm and dry  Psych: appropriate mood and affect      Narrative & Impression     12/6/2022 8:20 AM     HISTORY/REASON FOR EXAM:  Left hip pain..        TECHNIQUE/EXAM DESCRIPTION AND NUMBER OF VIEWS:  2 views of the LEFT hip.     COMPARISON: None     FINDINGS:  No evidence of fracture or arthropathy of the left hip. There are pelvic phleboliths. There is mild degenerative change of the lumbar spine.     IMPRESSION:     No evidence of left hip fracture or arthropathy.           Exam Ended: 12/06/22  8:30 AM Last Resulted: 12/06/22  8:35 AM           Narrative & Impression     12/17/2022 4:23 PM     HISTORY/REASON FOR EXAM: Pain following trauma     TECHNIQUE/EXAM DESCRIPTION:  CT cervical spine without contrast, with reconstructions.     The study was performed on a helical multidetector CT scanner. Thin-section helical scanning was performed from the skull base through T1. Sagittal and coronal multiplanar reconstructions were generated from the axial images.     Low dose optimization technique was utilized for this CT exam including automated exposure control and adjustment of the mA and/or kV according to patient size.     COMPARISON:  None.     FINDINGS:  Alignment in the cervical spine is normal. There is no fracture or traumatic listhesis. The craniovertebral junction appears intact.     The prevertebral and paraspinous soft tissues are unremarkable.     C4-C5 spondylosis.     Bilateral thyroid nodules, difficult to measure precisely.     IMPRESSION:     1.  No acute fracture or traumatic listhesis in the cervical spine.  2.  Bilateral thyroid nodules, which can be followed with  outpatient thyroid ultrasound.           Exam Ended: 12/17/22  4:47 PM Last Resulted: 12/17/22  4:53 PM             ASSESSMENT/PLAN:    67 y.o.female     1. Concussion with unknown loss of consciousness status, subsequent encounter -stable.  Improving.  Follow-up with neurology.       2. History of head injury -stable.        3. Nonintractable headache, unspecified chronicity pattern, unspecified headache type-improving, stable.  Currently mild symptoms at times.  Avoid aggravating factors.  Continue to monitor.  Follow-up with neurology.       4. Double vision -improving.  Has had eye exam.  She will follow-up with neurology.       5. Left-sided thoracic back pain, unspecified chronicity -improved.  Lidoderm patch as needed. lidocaine (LIDODERM) 5 % Patch      6. Hip pain -recently more so right posterior hip.  Improved with Lidoderm patch.  X-rays negative in past.  Monitor.  If any continued symptoms follow-up after lab work. Sed Rate    CRP QUANTITIVE (NON-CARDIAC)      7. Back spasm -stable.  Flexeril as needed.  Patient will plan to follow-up with physical therapy starting with therapy for her neck issues.    8.       Neck pain on left side-stable.  Patient to follow-up with physical therapy.   9. Thyroid nodule - on prior ct imaging . US-THYROID       Return in about 2 months (around 3/26/2023).  Patient to follow-up sooner as needed.    This medical record contains text that has been entered with the assistance of computer voice recognition and dictation software.  Therefore, it may contain unintended errors in text, spelling, punctuation, or grammar.

## 2023-02-01 ENCOUNTER — HOSPITAL ENCOUNTER (OUTPATIENT)
Dept: RADIOLOGY | Facility: MEDICAL CENTER | Age: 68
End: 2023-02-01
Payer: MEDICARE

## 2023-02-01 DIAGNOSIS — E04.2 MULTIPLE THYROID NODULES: ICD-10-CM

## 2023-02-01 PROCEDURE — 76536 US EXAM OF HEAD AND NECK: CPT

## 2023-02-05 DIAGNOSIS — E04.1 RIGHT THYROID NODULE: ICD-10-CM

## 2023-02-06 ENCOUNTER — HOSPITAL ENCOUNTER (OUTPATIENT)
Dept: LAB | Facility: MEDICAL CENTER | Age: 68
End: 2023-02-06
Attending: FAMILY MEDICINE
Payer: MEDICARE

## 2023-02-06 DIAGNOSIS — M25.559 HIP PAIN: ICD-10-CM

## 2023-02-06 LAB
CRP SERPL HS-MCNC: <0.3 MG/DL (ref 0–0.75)
ERYTHROCYTE [SEDIMENTATION RATE] IN BLOOD BY WESTERGREN METHOD: 3 MM/HOUR (ref 0–25)

## 2023-02-06 PROCEDURE — 85652 RBC SED RATE AUTOMATED: CPT

## 2023-02-06 PROCEDURE — 36415 COLL VENOUS BLD VENIPUNCTURE: CPT

## 2023-02-06 PROCEDURE — 86140 C-REACTIVE PROTEIN: CPT

## 2023-05-25 RX ORDER — AMLODIPINE BESYLATE 2.5 MG/1
2.5 TABLET ORAL DAILY
Qty: 100 TABLET | Refills: 2 | Status: SHIPPED | OUTPATIENT
Start: 2023-05-25

## 2023-05-25 NOTE — TELEPHONE ENCOUNTER
Received request via: Patient    Was the patient seen in the last year in this department? Yes    Does the patient have an active prescription (recently filled or refills available) for medication(s) requested? No    Does the patient have snf Plus and need 100 day supply (blood pressure, diabetes and cholesterol meds only)? Yes, quantity updated to 100 days

## 2023-05-26 ENCOUNTER — TELEPHONE (OUTPATIENT)
Dept: HEALTH INFORMATION MANAGEMENT | Facility: OTHER | Age: 68
End: 2023-05-26

## 2023-06-01 ENCOUNTER — OFFICE VISIT (OUTPATIENT)
Dept: NEUROLOGY | Facility: MEDICAL CENTER | Age: 68
End: 2023-06-01
Attending: PSYCHIATRY & NEUROLOGY
Payer: MEDICARE

## 2023-06-01 VITALS
BODY MASS INDEX: 24.34 KG/M2 | TEMPERATURE: 97.3 F | WEIGHT: 132.28 LBS | DIASTOLIC BLOOD PRESSURE: 78 MMHG | HEART RATE: 93 BPM | OXYGEN SATURATION: 93 % | HEIGHT: 62 IN | SYSTOLIC BLOOD PRESSURE: 120 MMHG

## 2023-06-01 DIAGNOSIS — G44.329 CHRONIC INTERMITTENT POST-TRAUMATIC HEADACHE: ICD-10-CM

## 2023-06-01 DIAGNOSIS — G31.84 MILD COGNITIVE IMPAIRMENT WITH MEMORY LOSS: ICD-10-CM

## 2023-06-01 DIAGNOSIS — H53.2 DIPLOPIA: ICD-10-CM

## 2023-06-01 DIAGNOSIS — S06.0X0A CONCUSSION WITHOUT LOSS OF CONSCIOUSNESS, INITIAL ENCOUNTER: Primary | ICD-10-CM

## 2023-06-01 PROCEDURE — 99204 OFFICE O/P NEW MOD 45 MIN: CPT | Performed by: PSYCHIATRY & NEUROLOGY

## 2023-06-01 PROCEDURE — 99212 OFFICE O/P EST SF 10 MIN: CPT | Performed by: PSYCHIATRY & NEUROLOGY

## 2023-06-01 PROCEDURE — 3078F DIAST BP <80 MM HG: CPT | Performed by: PSYCHIATRY & NEUROLOGY

## 2023-06-01 PROCEDURE — 3074F SYST BP LT 130 MM HG: CPT | Performed by: PSYCHIATRY & NEUROLOGY

## 2023-06-01 ASSESSMENT — FIBROSIS 4 INDEX: FIB4 SCORE: 1.44

## 2023-06-01 ASSESSMENT — PATIENT HEALTH QUESTIONNAIRE - PHQ9: CLINICAL INTERPRETATION OF PHQ2 SCORE: 0

## 2023-06-01 NOTE — PROGRESS NOTES
"Reason for Neurology Consult:  ? Of Dementia; single concussive event in 12/2022.    History of present illness:    Funmilayo Egan 67 y.o. right handed woman who is from [place] but who has been in the [place] for over 40  years.  Lives with .  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    She described 1 week before Christmas 2022 and went outside her house and slipped on black ice and was going to open car door. It was about 6 pm in the evening and by her own view she had both legs went out in front of her  (on the ice) and her head hit back of head (no clear LOC or prodrome).  to her to [facility] ([facility])- 3 days and 4 months.     While she was in the hospital she was having mild diplopia and diffuse headaches (bilateral).  Headaches still bothersome about 3 days a week for hours out a time.   No ongoing nausea,emesis,photophobia,speech-language disturbances ongoing or evolving.    Double vision has persisted since the head bonk as above without any clear decline.  If she looks at me she recognizes there are 2 images (the \"fake one is to the right\" of the real image.  She has double vision to multiple directions of gaze which is gone after she closes one eye.    2 weeks ago she got lost once driving in the [place] but has not felt she is having worsening vision or cognitive changes.    She has no evolving gait-balance difficulties in the last 3 months.    She has not had any dysarthria,dysphagia,vertiginous events, tinnitus in the recent months.    No ongoing subjective weakness of the limbs or sensory disturbances of the limbs x 4 in the last 3 months or ongoing.    No prior history of concussion(s) or seizure type events (episodes) described to me today in review.    No paranoia,delusions,visual or auditory hallucinations in the recent months.    Denies feeling or being anxious,depressed or " "suicidal in the last 3-6 months.    No REM Behavioral symptoms and and averages 7-8 hours of sleep most nights in the recent 3-6 months or so.    Before the 12/2022 concussive event, she was being told by people at work \"I just told you a few minutes ago that\" but this issue was very mild.  has told her on multiple occasions that \"I just told that\" within 2 hours or so.      Infrequent smoking - quit in 1993.  No significant alcohol use in adult life.      Family Hx: Sister (dementia- onset in mid 60's and had sixto dementia issue by 60's)> now age 73.  Parents: no significant cognitive/memory difficulties.    Patient Active Problem List    Diagnosis Date Noted    Traumatic head injury less than 3 months ago 12/29/2022    Intractable neck and back pain due to muscle spasms 12/28/2022    Bilateral foot pain 05/17/2022    Scalp irritation 05/17/2022    Gastroesophageal reflux disease without esophagitis 09/10/2021    Generalized abdominal pain 12/21/2020    Urge incontinence of urine 04/12/2019    Hair pulling 03/12/2019    Essential hypertension 03/12/2019    Age-related osteoporosis without current pathological fracture 03/12/2019    Dyslipidemia 03/12/2019       Past medical history:   Past Medical History:   Diagnosis Date    ASTHMA     remote hx    Bowel habit changes     Chest discomfort 04/2019    Coronary CT scan with LAD 11.4.     COPD     mild, not on meds for it    Hyperlipidemia     Hypertension     OSTEOPOROSIS     Shortness of breath 05/2019    Echocardiogram with normal LV size, LVEF 65%.Trace MR. Mild TR. RVSP 30mmHg.       Past surgical history:   Past Surgical History:   Procedure Laterality Date    HEMORRHOIDECTOMY N/A 4/19/2021    Procedure: HEMORRHOIDECTOMY - INTERNAL AND EXTERNAL, COMPLEX OR EXTENSIVE.;  Surgeon: Estela Kenney M.D.;  Location: SURGERY [facility];  Service: General    OTHER  2020    Esophagus    ABDOMINAL HYSTERECTOMY TOTAL      APPENDECTOMY      HEMORRHOIDECTOMY   "         Social history:   Social History     Socioeconomic History    Marital status:      Spouse name: Not on file    Number of children: Not on file    Years of education: Not on file    Highest education level: Not on file   Occupational History    Not on file   Tobacco Use    Smoking status: Former     Packs/day: 0.00     Types: Cigarettes    Smokeless tobacco: Never    Tobacco comments:     20 y.a   Vaping Use    Vaping Use: Never used   Substance and Sexual Activity    Alcohol use: Not Currently     Alcohol/week: 2.4 oz     Types: 4 Glasses of wine per week     Comment: stopped in 2020    Drug use: Yes     Types: Oral     Comment: 4/17/2021    Sexual activity: Never   Other Topics Concern    Not on file   Social History Narrative    Not on file     Social Determinants of Health     Financial Resource Strain: Not on file   Food Insecurity: Not on file   Transportation Needs: Not on file   Physical Activity: Not on file   Stress: Not on file   Social Connections: Not on file   Intimate Partner Violence: Not on file   Housing Stability: Not on file       Family history:   Family History   Problem Relation Age of Onset    Heart Disease Mother 65    Cancer Father     Heart Disease Brother          Current medications:   Current Outpatient Medications   Medication    amLODIPine (NORVASC) 2.5 MG Tab    sertraline (ZOLOFT) 50 MG Tab    hydrocortisone rectal (ANUSOL-HC) 2.5% Cream    cyclobenzaprine (FLEXERIL) 5 mg tablet    TURMERIC PO    Cholecalciferol (VITAMIN D-3) 125 MCG (5000 UT) Tab    docusate sodium (COLACE) 100 MG Cap    atorvastatin (LIPITOR) 10 MG Tab    raloxifene (EVISTA) 60 MG Tab    lidocaine (LIDODERM) 5 % Patch    tolterodine ER (DETROL-LA) 2 MG CAPSULE SR 24 HR     No current facility-administered medications for this visit.       Medication Allergy:  Allergies   Allergen Reactions    Sulfa Drugs Rash     7/15/19 welting urticaria/rash after taking Bactrim     Pcn [Penicillins] Itching      "Blisters           Physical examination:   Vitals:    06/01/23 1300   BP: 120/78   BP Location: Right arm   Patient Position: Sitting   BP Cuff Size: Adult   Pulse: 93   Temp: 36.3 °C (97.3 °F)   TempSrc: Temporal   SpO2: 93%   Weight: 60 kg (132 lb 4.4 oz)   Height: 1.575 m (5' 2\")       Normal cephalic atraumatic.  There is full range of movement around the neck in all directions without restrictions or discrete pain evoked triggers.  No lower extremity edema.      Neurological  Exam:      Gervais Cognitive Assessment (MOCA) Version 7.1    Years of Education: High School ([employer])    TOTAL SCORE: 25/30  (to be scanned into the MEDIA section in the E.M.R.)          Mental status: Awake, alert and fully oriented to person, place, time, and situation. Normal attention and concentration.  Did not appear/act combative,irritable,anxious,paranoid/delusional or aggressive to or with me.    Speech and language: Speech is fluent without errors, clear, intact to repetition, and intact to naming.     Follows 3 step motor commands in sequence without significant delay and correctly.    Cranial nerve exam:  II: Pupils are equally round and reactive to light. Visual fields are intact by confrontation.  III, IV, VI: EOMI.  Pupils 4>3 mm bilateraly.  Sqew diplopia at mid line and upgaze, less to right lateral and upper quadrant but present to left left latera,left upper and left lower quadrant (diplopia is worse to left side).    V: Sensation to light touch is normal over V1-3 distributions bilaterally.  .  VII: Facial movements are symmetrical. There is no facial droop. .  VIII: Hearing intact to soft speech and finger rub bilaterally  IX: Palate elevates symmetrically, uvula is midline. Dysarthria is not present.  XI: Shoulder shrug are symmetrical and strong.   XII: Tongue protrudes midline.      Motor exam:  Muscle tone is normal in all 4 limbs. and No abnormal movements appreciated.    Muscle strength:    Neck " Flexors/Extensors: 5/5       Right  Left  Deltoid   5/5  5/5      Biceps   5/5  5/5  Triceps              5/5  5/5   Wrist extensors 5/5  5/5  Wrist flexors  5/5  5/5     5/5  5/5  Interossei  5/5  5/5  Thenar (APB)  5/5  5/5   Hip flexors  5/5  5/5  Quadriceps  5/5  5/5    Hamstrings  5/5  5/5  Dorsiflexors  5/5  5/5  Plantarflexors  5/5  5/5  Toe extension  5/5  5/5        Reflexes:       Right  Left  Biceps   2/4 2/4  Triceps              2/4 2/4  Brachioradialis             2/4 2/4  Knee jerk  2/4 2/4  Ankle jerk  2/4 2/4     Frontal release signs are absent    bilaterally toes are downgoing to plantar stimulation..    Coordination (finger-to-nose, heel/knee/shin, rapid alternating movements) was normal.     There was no ataxia, no tremors, and no dysmetria.     Station and gait >> Easily stands up from exam chair without retropulsion,veering,leaning,swaying (to either side).       Labs and Tests:    Vitamin D, TSH, B12 and Folate: normal  (12/2022)     Ref Range & Units 5 mo ago  (12/6/22) 1 yr ago  (5/31/22) 2 yr ago  (4/13/21) 2 yr ago  (3/9/21) 2 yr ago  (3/8/21) 2 yr ago  (2/22/21) 2 yr ago  (6/9/20)   Sodium 135 - 145 mmol/L 142  140  140  141  139  139  140    Potassium 3.6 - 5.5 mmol/L 4.5  4.5  4.5  4.3  3.8  4.0  4.5    Chloride 96 - 112 mmol/L 106  102  103  103  103  102  104    Co2 20 - 33 mmol/L 28  30  30  28  25  29  24    Anion Gap 7.0 - 16.0 8.0  8.0  7.0  10.0  11.0  8.0  12.0    Glucose 65 - 99 mg/dL 100 High   102 High   90  96  98  99  112 High     Bun 8 - 22 mg/dL 15  16  14  14  16  15  15    Creatinine 0.50 - 1.40 mg/dL 0.64  0.69  0.67  0.65  0.66  0.68  0.69    Calcium 8.4 - 10.2 mg/dL 9.4  9.4  10.3 R  9.4 R  10.4 R  9.2  9.4    AST(SGOT) 12 - 45 U/L 17  18    27  27  54 High     ALT(SGPT) 2 - 50 U/L 17  16    26  29  60 High     Alkaline Phosphatase 30 - 99 U/L 47  55    50  45  44    Total Bilirubin 0.1 - 1.5 mg/dL 0.4  0.4    0.6  0.4  0.4    Albumin 3.2 - 4.9 g/dL 4.6   4.6    4.6  4.4  4.5    Total Protein 6.0 - 8.2 g/dL 7.0  7.0    7.3  6.7  6.8    Globulin 1.9 - 3.5 g/dL 2.4  2.4    2.7  2.3  2.3    A-G Ratio g/dL 1.9  1.9    1.7  1.9  2.0    Resulting Agency  V V             NEUROIMAGIN/29/2022 4:09 PM     HISTORY/REASON FOR EXAM:  Fall 10 days ago with neck pain     TECHNIQUE/EXAM DESCRIPTION:     T1 sagittal, T2 axial, flair coronal, T1 coronal, and diffusion-weighted axial images were obtained of the brain.     COMPARISON: CT brain 2022.     FINDINGS:     Diffusion-weighted images are normal. No acute infarct is identified. Gradient-echo images are normal. Nonspecific T2 hyperintensities are noted in the periventricular and deep white matter, most likely related to chronic microvascular ischemia.     There is no evidence of intracranial mass or mass effect. No evidence of midline shift.  There is no evidence of focal cerebral edema.  There are no extra axial collections.  Visualized intracranial arterial flow voids are within normal limits.  Bone marrow signal in the calvarium is within normal limits.  Included portions of the paranasal sinuses are within normal limits.  Included portions of the mastoid air cells are within normal limits.  Included portions of the orbits are within normal limits     IMPRESSION:        No acute intracranial process.     Nonspecific T2 hyperintensities are noted in the periventricular and deep white matter, most likely related to chronic microvascular ischemia.           Exam Ended: 22  5:36 PM Last Resulted: 22  9:03 AM              2022 4:09 PM     HISTORY/REASON FOR EXAM:  Fall with neck pain     TECHNIQUE/EXAM DESCRIPTION: MRI of the cervical spine without and with contrast.     The study was performed on a [employer]a 1.5 Claudia MRI scanner. T1 sagittal, T2 fast spin-echo sagittal, T1 postcontrast fat-suppressed sagittal, and gradient-echo axial images were obtained of the cervical spine. 10 mL ProHance  contrast were administered   intravenously.     COMPARISON:  CT cervical spine dated 12/17/2022.     .     FINDINGS:  Vertebral body heights are preserved. Minimal type I marrow changes are noted at the inferior endplate of. Spinal cord signal intensity is within normal limits.  Craniocervical junction is within normal limits.     Findings specific to each level are described below:     C2-3: Normal  C3-4:  Minimal endplate disc osteophyte complex without stenosis.  C4-5:  Mild endplate disc osteophyte complex and uncovertebral degeneration without significant canal or foraminal stenosis.  C5-6: Minimal disc osteophyte complex and facet degeneration without stenosis.  C6-7: No significant abnormality.  C7-T1: No significant abnormality.     Postcontrast images through the cervical spine do not demonstrate any abnormal enhancement.     IMPRESSION:        1.  Mild cervical spine degenerative changes without significant canal stenosis or foraminal narrowing. There is no abnormal enhancement in the cervical spine.           Exam Ended: 12/29/22  5:37 PM Last Resulted: 12/30/22  9:08 AM             Impression/Plans/Recommendations:    Post Concussive Syndrome- mild with persistent but chronic diplopia,period frontal headaches (not daily) and memory disturbances (note memory disturbances present for last 1-2 years).    Brain MRI reviewed from 12/2022.    2.  Diplopia- since head trauma (12/2022)- static.  Diplopia at mid gaze, mid and up but most prominent when looking to left and left and upper quadrant.  No other cranial neuropathies identified on exam such as involving the 5th,7th,8th cranial nerves.    Funmilayo never  had diplopia before the 12/2022 event.  No ptosis.    3. Post Traumatic Headaches- clearly improved (occurring 3 days per week); no signs or symptoms of increased ICP at this time.  Bifrontal and back of neck- chronic.    4. Mild Cognitive Impairment- chronic- even before single concussive event.      MOCA  "today (25/30)  showed some problems with the executive function section (trails and cube copying).        Today, I reviewed the clinical criteria and reviewed several  scenarios of the differences being using the medical terms of normal brain aging (age associated memory impairment),  Mild Cognitive Impairment (MCI) and Dementia.    MCI is a syndrome but statistically and for the majority of patients  occurs due  to a more rapid aging of the brain tissue or potentially from injury to certain parts of one's brain ( often from such contributing factors as  the cumulative effects of alcohol, from one or more ischemic or hemmorhagic stroke(s), from neurodegeneration or long standing with/without suboptimally controlled Hypertension). It is for some of these potential factors as to why I recommend a brain imaging test (Head CT or Brain MRI) be done for the 1st time or in certain circumstances repeated for comparison purposes  as such imaging can suggest one or more factors as to the reason(s) for the person's cognitive/memory changes. In fact, a normal or \"age related\" finding on a brain imaging test in and of itself is useful clinical and objective information to have in the evaluation of a person who has either an acute, evolving or even chronic (months to years) long cognitive/memory complaint.    Additional factors or contributors to Brain Health issues can be summarized in several books/references which I discussed as well today.     Goals going forwards include:    A. Paying attention to one's risk factors and reducing their prevalence or potential impact on one's changing memory/thinking> an excellent example would be to stop smoking, reduce or eliminate alcohol use (depending on the amount and frequency of usage), maintain good blood pressure control by buying a digital arm blood pressure cuff such as an OMRON Series 3 or 5 and checking one's blood pressure atleast 3 days per week (in the am and early afternoon) " that the numbers are under 140/90 and working as needed with the primary care doctor  to optimize blood pressure control).      B. Encouraging proper sleep hygiene which for most adults is 7 to 8 hours of uninterrupted sleep per night.      C. Encouraging some form of exercise preferable aerobic forms to be done (4 to 5 days per week- 30 minutes minimum per day)> 150 minutes per week as a goal. Example activities could include fast walking (up a slight incline), jogging, cycling (road or stationary), swimming,tennis. Essentially, even basic walking on a flat surface or a treadmill would be better than doing nothing.    D. Maintaining or forming new social contacts with family and friends  and a positive attitude despite the concerns and/or ongoing issues with thinking and/or memory.    E. Eating well which means a diet low in salt  (under 2 grams per day), sugar and saturated fat.    F. Maintaining one's BMI (Body Mass Index) under 30.    G. Brain Health issues reviewed today.    H.  Referral to our PhD Neuropsychologist for a more formal evaluation of cognitive ability.    I. Referral to Neuro Ophthamology for consideration of prisms given persistent diplopia since head trauma.    J. At this time will hold off on headache reducing medications given side effect risks of these medications (TCA, Gabapentin).    K. At this time I do not feel another Brain imaging test is needed.      I have performed  a history and physical exam and a directed /focused  ROS today.    Total time spent today or this patient's care was  50 minutes  and included reviewing  the diagnostic workup to date (such as labs and imaging as well as interpreting such tests relevant to this patient's neurological condition),  reviewing/obtaining separately obtained history from Funmilayo   for today's neurological problem(s) ,counseling and educating the patient and family member on issues related to cognition/memory and cognitive health factors and  documenting  the clinical information in the EMR.    Follow up in 6-9 months or so        Olman Ross MD  [facility].   [facility]

## 2023-06-05 ENCOUNTER — APPOINTMENT (OUTPATIENT)
Dept: RADIOLOGY | Facility: MEDICAL CENTER | Age: 68
End: 2023-06-05
Attending: FAMILY MEDICINE
Payer: MEDICARE

## 2023-06-13 ENCOUNTER — TELEPHONE (OUTPATIENT)
Dept: MEDICAL GROUP | Facility: IMAGING CENTER | Age: 68
End: 2023-06-13
Payer: MEDICARE

## 2023-06-13 NOTE — TELEPHONE ENCOUNTER
GARYM to reschedule patient's 06/16/23 appointment with Dr. Mera. Provider will be out-of-office.

## 2023-06-22 ENCOUNTER — OFFICE VISIT (OUTPATIENT)
Dept: MEDICAL GROUP | Facility: IMAGING CENTER | Age: 68
End: 2023-06-22
Payer: MEDICARE

## 2023-06-22 VITALS
HEIGHT: 62 IN | BODY MASS INDEX: 24.8 KG/M2 | HEART RATE: 65 BPM | OXYGEN SATURATION: 94 % | WEIGHT: 134.8 LBS | DIASTOLIC BLOOD PRESSURE: 80 MMHG | TEMPERATURE: 98 F | RESPIRATION RATE: 17 BRPM | SYSTOLIC BLOOD PRESSURE: 130 MMHG

## 2023-06-22 DIAGNOSIS — M47.812 SPONDYLOSIS OF CERVICAL REGION WITHOUT MYELOPATHY OR RADICULOPATHY: ICD-10-CM

## 2023-06-22 DIAGNOSIS — H53.2 DIPLOPIA: ICD-10-CM

## 2023-06-22 DIAGNOSIS — R51.9 NONINTRACTABLE HEADACHE, UNSPECIFIED CHRONICITY PATTERN, UNSPECIFIED HEADACHE TYPE: ICD-10-CM

## 2023-06-22 DIAGNOSIS — M54.2 NECK PAIN: ICD-10-CM

## 2023-06-22 DIAGNOSIS — R21 RASH AND NONSPECIFIC SKIN ERUPTION: ICD-10-CM

## 2023-06-22 DIAGNOSIS — M25.559 HIP PAIN, UNSPECIFIED LATERALITY: ICD-10-CM

## 2023-06-22 DIAGNOSIS — S06.0X0A CONCUSSION WITHOUT LOSS OF CONSCIOUSNESS, INITIAL ENCOUNTER: ICD-10-CM

## 2023-06-22 DIAGNOSIS — R41.3 MEMORY PROBLEM: ICD-10-CM

## 2023-06-22 PROCEDURE — 3075F SYST BP GE 130 - 139MM HG: CPT | Performed by: FAMILY MEDICINE

## 2023-06-22 PROCEDURE — 3079F DIAST BP 80-89 MM HG: CPT | Performed by: FAMILY MEDICINE

## 2023-06-22 PROCEDURE — 99214 OFFICE O/P EST MOD 30 MIN: CPT | Performed by: FAMILY MEDICINE

## 2023-06-22 RX ORDER — TRIAMCINOLONE ACETONIDE 1 MG/G
1 CREAM TOPICAL 2 TIMES DAILY
Qty: 45 G | Refills: 0 | Status: SHIPPED | OUTPATIENT
Start: 2023-06-22

## 2023-06-22 RX ORDER — POLYETHYLENE GLYCOL 3350 17 G/17G
17 POWDER, FOR SOLUTION ORAL DAILY
COMMUNITY

## 2023-06-22 ASSESSMENT — PAIN SCALES - GENERAL: PAINLEVEL: NO PAIN

## 2023-06-22 ASSESSMENT — FIBROSIS 4 INDEX: FIB4 SCORE: 1.44

## 2023-06-22 NOTE — PATIENT INSTRUCTIONS
Neurology [facility]  [address], [address]  [place] [address]  Phone: [phone]

## 2023-06-22 NOTE — PROGRESS NOTES
SUBJECTIVE:    Chief Complaint   Patient presents with    Follow-Up     Pt states the bilateral hip buckling has improved. She is trying exercises for it.     Rash     Pt states she has dry skin on the left side of her neck.        HPI:     Funmilayo Egan is a 67 y.o. female with hypertension, dyslipidemia, osteoporosis and gerd here for follow up of prior head injury and headache.     Headache- improved but may still has some headaches. Does not notice daily.   Has seen neurology.   Feels some headache due to her neck. Has neck issues.   Some neck exercise. Has not been to PT in a while, but did not start on her neck.   [facility]    Memory issues, family hx of dementia in sister, 5 years older than her .  Double vision- has referral to neuro-ophthalmology.   Left side neck with rash.   Hips better with physical therapy, more so left previously.     ROS:  No recent fevers or chills. No nausea or vomiting. No diarrhea. No chest pains or shortness of breath. No lower extremity edema.    Current Outpatient Medications on File Prior to Visit   Medication Sig Dispense Refill    polyethylene glycol/lytes (MIRALAX) 17 g Pack Take 17 g by mouth every day.      amLODIPine (NORVASC) 2.5 MG Tab Take 1 Tablet by mouth every day. 100 Tablet 2    sertraline (ZOLOFT) 50 MG Tab TAKE 1 TABLET BY MOUTH EVERY DAY 90 Tablet 3    hydrocortisone rectal (ANUSOL-HC) 2.5% Cream Insert 1 Application into the rectum 2 times a day. 28 g 0    cyclobenzaprine (FLEXERIL) 5 mg tablet Take 2 Tablets by mouth 3 times a day as needed for Muscle Spasms or Moderate Pain. 30 Tablet 0    TURMERIC PO Take 2 Tablets by mouth every morning.      Cholecalciferol (VITAMIN D-3) 125 MCG (5000 UT) Tab Take 5,000 Units by mouth every 7 days. On Monday      docusate sodium (COLACE) 100 MG Cap Take 100 mg by mouth 2 times a day.      tolterodine ER (DETROL-LA) 2 MG CAPSULE SR 24 HR Take 2 mg by mouth every day.       "atorvastatin (LIPITOR) 10 MG Tab Take 1 Tablet by mouth every evening. 100 Tablet 3    raloxifene (EVISTA) 60 MG Tab TAKE 1 TABLET BY MOUTH EVERY DAY 90 Tablet 3    lidocaine (LIDODERM) 5 % Patch Place 1 Patch on the skin every 24 hours as needed (pain). 30 Patch 2     No current facility-administered medications on file prior to visit.       Allergies   Allergen Reactions    Sulfa Drugs Rash     7/15/19 welting urticaria/rash after taking Bactrim     Pcn [Penicillins] Itching     Blisters       Patient Active Problem List    Diagnosis Date Noted    Concussion with no loss of consciousness 06/01/2023    Diplopia 06/01/2023    Traumatic head injury less than 3 months ago 12/29/2022    Intractable neck and back pain due to muscle spasms 12/28/2022    Bilateral foot pain 05/17/2022    Scalp irritation 05/17/2022    Gastroesophageal reflux disease without esophagitis 09/10/2021    Generalized abdominal pain 12/21/2020    Urge incontinence of urine 04/12/2019    Hair pulling 03/12/2019    Essential hypertension 03/12/2019    Age-related osteoporosis without current pathological fracture 03/12/2019    Dyslipidemia 03/12/2019       Past Medical History:   Diagnosis Date    ASTHMA     remote hx    Bowel habit changes     Chest discomfort 04/2019    Coronary CT scan with LAD 11.4.     COPD     mild, not on meds for it    Hyperlipidemia     Hypertension     OSTEOPOROSIS     Shortness of breath 05/2019    Echocardiogram with normal LV size, LVEF 65%.Trace MR. Mild TR. RVSP 30mmHg.       OBJECTIVE:   BP (!) 142/80 (BP Location: Left arm, Patient Position: Sitting, BP Cuff Size: Adult)   Pulse 65   Temp 36.7 °C (98 °F) (Temporal)   Resp 17   Ht 1.575 m (5' 2\")   Wt 61.1 kg (134 lb 12.8 oz)   SpO2 94%   BMI 24.66 kg/m²   General: Well-developed well-nourished female, no acute distress  Neck: supple, no lymphadenopathy- cervical or supraclavicular, no thyromegaly  Cardiovascular: regular rate and rhythm, no murmurs, " gallops, rubs  Lungs: clear to auscultation bilaterally, no wheezes, crackles, or rhonchi  Abdomen: +bowel sounds, soft, nontender, nondistended, no rebound, no guarding, no hepatosplenomegaly  Extremities: no cyanosis, clubbing, edema  Skin: Warm and dry. Left side neck dry pink patches.   Psych: appropriate mood and affect        ASSESSMENT/PLAN:    67 y.o.female       1. Concussion without loss of consciousness, initial encounter - mild, stable. Appears improving.   Follow up with neurology routinely. Monitor.        2. Nonintractable headache, unspecified chronicity pattern, unspecified headache type - some improvement noted, otherwise stable. Hx of concussion and neck issues likely contributing. Referral to physical therapy as below. Consider acupuncture therapy in future. Monitor. Follow up with neurology. OTC medication as needed. Monitor.        3. Diplopia   Has referral to neuro-ophthalmology.        4. Memory problem   Follow up with neurology.        5. Neck pain - had mri imaging in past.  Referral to Physical Therapy      6. Spondylosis of cervical region without myelopathy or radiculopathy  Referral to Physical Therapy      7. Rash and nonspecific skin eruption -dermatitis.   Avoid hot water. Topical therapy. Follow up as needed.  triamcinolone acetonide (KENALOG) 0.1 % Cream      8. Hip pain, unspecified laterality - improved, continue exercises. Monitor.            Return in about 2 months (around 8/22/2023).    This medical record contains text that has been entered with the assistance of computer voice recognition and dictation software.  Therefore, it may contain unintended errors in text, spelling, punctuation, or grammar.

## 2023-07-05 DIAGNOSIS — M81.0 AGE-RELATED OSTEOPOROSIS WITHOUT CURRENT PATHOLOGICAL FRACTURE: ICD-10-CM

## 2023-07-05 DIAGNOSIS — M47.812 SPONDYLOSIS OF CERVICAL REGION WITHOUT MYELOPATHY OR RADICULOPATHY: ICD-10-CM

## 2023-07-05 RX ORDER — RALOXIFENE HYDROCHLORIDE 60 MG/1
60 TABLET, FILM COATED ORAL
Qty: 90 TABLET | Refills: 0 | Status: CANCELLED | OUTPATIENT
Start: 2023-07-05

## 2023-07-06 DIAGNOSIS — M81.0 AGE-RELATED OSTEOPOROSIS WITHOUT CURRENT PATHOLOGICAL FRACTURE: ICD-10-CM

## 2023-07-06 RX ORDER — RALOXIFENE HYDROCHLORIDE 60 MG/1
60 TABLET, FILM COATED ORAL
Qty: 90 TABLET | Refills: 3 | Status: SHIPPED | OUTPATIENT
Start: 2023-07-06

## 2023-07-06 NOTE — PROGRESS NOTES
1. Caller Name: Funmilayo Egan                         Call Back Number: [phone] (home) [phone] (work)       How would the patient prefer to be contacted with a response: Phone call do NOT leave a detailed message    2. SPECIFIC Action To Be Taken: Orders pending, please sign.    3. Diagnosis/Clinical Reason for Request:   M47.812 (ICD-10-CM) - Spondylosis of cervical region without myelopathy or radiculopathy   M54.2 (ICD-10-CM) - Neck pain       4. Specialty & Provider Name/Lab/Imaging Location: [facility]     5. Is appointment scheduled for requested order/referral: no    Patient was informed they will receive a return phone call from the office ONLY if there are any questions before processing their request. Advised to call back if they haven't received a call from the referral department in 5 days.

## 2023-07-17 ENCOUNTER — OFFICE VISIT (OUTPATIENT)
Dept: URGENT CARE | Facility: CLINIC | Age: 68
End: 2023-07-17
Payer: MEDICARE

## 2023-07-17 VITALS
HEART RATE: 92 BPM | SYSTOLIC BLOOD PRESSURE: 144 MMHG | BODY MASS INDEX: 24.48 KG/M2 | RESPIRATION RATE: 18 BRPM | OXYGEN SATURATION: 94 % | DIASTOLIC BLOOD PRESSURE: 88 MMHG | WEIGHT: 133 LBS | HEIGHT: 62 IN | TEMPERATURE: 97.9 F

## 2023-07-17 DIAGNOSIS — K04.7 DENTAL INFECTION: ICD-10-CM

## 2023-07-17 PROCEDURE — 99213 OFFICE O/P EST LOW 20 MIN: CPT | Performed by: PHYSICIAN ASSISTANT

## 2023-07-17 PROCEDURE — 3079F DIAST BP 80-89 MM HG: CPT | Performed by: PHYSICIAN ASSISTANT

## 2023-07-17 PROCEDURE — 3077F SYST BP >= 140 MM HG: CPT | Performed by: PHYSICIAN ASSISTANT

## 2023-07-17 RX ORDER — CLINDAMYCIN HYDROCHLORIDE 300 MG/1
300 CAPSULE ORAL 3 TIMES DAILY
Qty: 21 CAPSULE | Refills: 0 | Status: SHIPPED | OUTPATIENT
Start: 2023-07-17 | End: 2023-07-24

## 2023-07-17 RX ORDER — AMLODIPINE BESYLATE 2.5 MG/1
1 TABLET ORAL
COMMUNITY
End: 2023-12-01

## 2023-07-17 RX ORDER — ATORVASTATIN CALCIUM 10 MG/1
1 TABLET, FILM COATED ORAL EVERY EVENING
COMMUNITY
End: 2023-12-01

## 2023-07-17 ASSESSMENT — ENCOUNTER SYMPTOMS
EYE DISCHARGE: 0
EYE REDNESS: 0
HEADACHES: 1
DIARRHEA: 0
COUGH: 0
NAUSEA: 0
VOMITING: 0
SHORTNESS OF BREATH: 0
SORE THROAT: 1
FEVER: 0

## 2023-07-17 ASSESSMENT — FIBROSIS 4 INDEX: FIB4 SCORE: 1.44

## 2023-07-17 NOTE — PROGRESS NOTES
Subjective     Funmilayo Egan is a 67 y.o. female who presents with Otalgia (X2days, Left ear pain into head, starts on face by tooth, feels like she has a swollen lymph node, )            This is a new problem.  The patient presents to clinic complaining of pain to the left side of her face x2-3 days.  The patient states she developed pain to the left side of her face on Friday evening.  The patient states this pain involved her left upper teeth, left ear, and left side of the throat.  The patient states this pain has been intermittent.  The patient states she has been experiencing a mild headache secondary to the pain.  The patient believes the pain could be related to a dental infection.  The patient states she currently has a temporary cap on one of her left upper teeth, which she believes may be involved.  The patient reports no associated fever.  She also reports no nausea or vomiting.  The patient denies recent URI-like symptoms.  She reports no cough.  No congestion.  The patient has not taken any OTC medications for her current symptoms.    Otalgia   There is pain in the left ear. This is a new problem. Episode onset: x2-3 days ago. Episode frequency: The patient states her pain is intermittent. The problem has been unchanged. There has been no fever. Associated symptoms include headaches and a sore throat. Pertinent negatives include no coughing, diarrhea or vomiting. She has tried nothing for the symptoms.     PMH:  has a past medical history of ASTHMA, Bowel habit changes, Chest discomfort (04/2019), COPD, Hyperlipidemia, Hypertension, OSTEOPOROSIS, and Shortness of breath (05/2019).  MEDS:   Current Outpatient Medications:     raloxifene (EVISTA) 60 MG Tab, Take 1 Tablet by mouth every day., Disp: 90 Tablet, Rfl: 3    polyethylene glycol/lytes (MIRALAX) 17 g Pack, Take 17 g by mouth every day., Disp: , Rfl:     amLODIPine (NORVASC) 2.5 MG Tab, Take 1 Tablet by mouth every day., Disp: 100 Tablet, Rfl: 2     sertraline (ZOLOFT) 50 MG Tab, TAKE 1 TABLET BY MOUTH EVERY DAY, Disp: 90 Tablet, Rfl: 3    lidocaine (LIDODERM) 5 % Patch, Place 1 Patch on the skin every 24 hours as needed (pain)., Disp: 30 Patch, Rfl: 2    hydrocortisone rectal (ANUSOL-HC) 2.5% Cream, Insert 1 Application into the rectum 2 times a day., Disp: 28 g, Rfl: 0    TURMERIC PO, Take 2 Tablets by mouth every morning., Disp: , Rfl:     Cholecalciferol (VITAMIN D-3) 125 MCG (5000 UT) Tab, Take 5,000 Units by mouth every 7 days. On Monday, Disp: , Rfl:     docusate sodium (COLACE) 100 MG Cap, Take 100 mg by mouth 2 times a day., Disp: , Rfl:     tolterodine ER (DETROL-LA) 2 MG CAPSULE SR 24 HR, Take 2 mg by mouth every day., Disp: , Rfl:     atorvastatin (LIPITOR) 10 MG Tab, Take 1 Tablet by mouth every evening., Disp: 100 Tablet, Rfl: 3    amLODIPine (NORVASC) 2.5 MG Tab, Take 1 Tablet by mouth every day. (Patient not taking: Reported on 7/17/2023), Disp: , Rfl:     atorvastatin (LIPITOR) 10 MG Tab, Take 1 Tablet by mouth every evening. (Patient not taking: Reported on 7/17/2023), Disp: , Rfl:     sertraline (ZOLOFT) 50 MG Tab, Take 1 Tablet by mouth every day. (Patient not taking: Reported on 7/17/2023), Disp: , Rfl:     triamcinolone acetonide (KENALOG) 0.1 % Cream, Apply 1 Application topically 2 times a day. (Patient not taking: Reported on 7/17/2023), Disp: 45 g, Rfl: 0    cyclobenzaprine (FLEXERIL) 5 mg tablet, Take 2 Tablets by mouth 3 times a day as needed for Muscle Spasms or Moderate Pain. (Patient not taking: Reported on 7/17/2023), Disp: 30 Tablet, Rfl: 0  ALLERGIES:   Allergies   Allergen Reactions    Sulfa Drugs Rash     7/15/19 welting urticaria/rash after taking Bactrim     Pcn [Penicillins] Itching     Blisters     SURGHX:   Past Surgical History:   Procedure Laterality Date    HEMORRHOIDECTOMY N/A 4/19/2021    Procedure: HEMORRHOIDECTOMY - INTERNAL AND EXTERNAL, COMPLEX OR EXTENSIVE.;  Surgeon: Estela Kenney M.D.;  Location:  "SURGERY [facility];  Service: General    OTHER  2020    Esophagus    ABDOMINAL HYSTERECTOMY TOTAL      APPENDECTOMY      HEMORRHOIDECTOMY       SOCHX:  reports that she has quit smoking. Her smoking use included cigarettes. She has never used smokeless tobacco. She reports that she does not currently use alcohol after a past usage of about 2.4 oz of alcohol per week. She reports that she does not currently use drugs after having used the following drugs: Oral.  FH: Family history was reviewed, no pertinent findings to report      Review of Systems   Constitutional:  Negative for fever.   HENT:  Positive for congestion, ear pain and sore throat.    Eyes:  Negative for discharge and redness.   Respiratory:  Negative for cough and shortness of breath.    Cardiovascular:  Negative for chest pain.   Gastrointestinal:  Negative for diarrhea, nausea and vomiting.   Neurological:  Positive for headaches.              Objective     BP (!) 144/88   Pulse 92   Temp 36.6 °C (97.9 °F) (Temporal)   Resp 18   Ht 1.575 m (5' 2\")   Wt 60.3 kg (133 lb)   SpO2 94%   BMI 24.33 kg/m²      Physical Exam  Constitutional:       General: She is not in acute distress.     Appearance: Normal appearance. She is well-developed. She is not ill-appearing.   HENT:      Head: Normocephalic and atraumatic.      Right Ear: Tympanic membrane, ear canal and external ear normal.      Left Ear: Tympanic membrane, ear canal and external ear normal.      Nose: Nose normal.      Mouth/Throat:      Mouth: Mucous membranes are moist.      Dentition: Dental tenderness (slight tenderness to the left upper teeth) present. No gingival swelling, dental caries or dental abscesses.      Pharynx: Oropharynx is clear. No posterior oropharyngeal erythema.   Eyes:      Extraocular Movements: Extraocular movements intact.      Conjunctiva/sclera: Conjunctivae normal.   Cardiovascular:      Rate and Rhythm: Normal rate and regular rhythm.      Heart sounds: Normal " heart sounds.   Pulmonary:      Effort: Pulmonary effort is normal. No respiratory distress.      Breath sounds: Normal breath sounds. No wheezing.   Musculoskeletal:         General: Normal range of motion.      Cervical back: Normal range of motion and neck supple.   Skin:     General: Skin is warm and dry.   Neurological:      Mental Status: She is alert and oriented to person, place, and time.                             Assessment & Plan          1. Dental infection  - clindamycin (CLEOCIN) 300 MG Cap; Take 1 Capsule by mouth 3 times a day for 7 days.  Dispense: 21 Capsule; Refill: 0    The patient presents to clinic complaining of left-sided facial pain with involvement of the left upper teeth and left ear.  On physical exam, the patient had slight tenderness to the left upper teeth without gingival swelling or dental caries.  The remainder the patient's physical exam today in clinic was normal.  The patient's bilateral TMs are clear without erythema or signs of infection.  The patient is nontoxic and appears in no acute distress.  The patient's vital signs are stable and within normal limits.  She is afebrile today in clinic.  We will prescribe the patient clindamycin for presumed dental infection.  The patient is scheduled to follow-up with primary care tomorrow.  Advised the patient to follow-up with primary care as scheduled.  Instructed the patient to follow-up with her dentist.  Recommend OTC medications and supportive care for symptomatic management.  Discussed return precautions with the patient, and she verbalized understanding.        Differential diagnoses, supportive care, and indications for immediate follow-up discussed with patient.   Instructed to return to clinic or nearest emergency department for any change in condition, further concerns, or worsening of symptoms.    OTC Tylenol or Motrin for fever/discomfort.  Ice  Drink plenty of fluids  Follow-up with dentist as soon as possible  Return  to clinic or go to the ED if symptoms worsen or fail to improve, or if the patient should develop worsening/increasing dental pain, facial swelling, redness or warmth to the affected area, difficulty swallowing, shortness of breath, fever/chills, and/or any concerning symptoms.    Discussed plan with the patient, and she agrees to the above.     I personally reviewed prior external notes and test results pertinent to today's visit.  I have independently reviewed and interpreted all diagnostics ordered during this urgent care visit.     Please note that this dictation was created using voice recognition software. I have made every reasonable attempt to correct obvious errors, but I expect that there may be errors of grammar and possibly content that I did not discover before finalizing the note.     This note was electronically signed by Fadumo Tidwell PA-C

## 2023-07-18 ENCOUNTER — APPOINTMENT (OUTPATIENT)
Dept: MEDICAL GROUP | Facility: IMAGING CENTER | Age: 68
End: 2023-07-18
Payer: MEDICARE

## 2023-08-03 ENCOUNTER — APPOINTMENT (OUTPATIENT)
Dept: MEDICAL GROUP | Facility: IMAGING CENTER | Age: 68
End: 2023-08-03
Payer: MEDICARE

## 2023-08-14 ENCOUNTER — OFFICE VISIT (OUTPATIENT)
Dept: OPHTHALMOLOGY | Facility: MEDICAL CENTER | Age: 68
End: 2023-08-14
Payer: MEDICARE

## 2023-08-14 DIAGNOSIS — H25.13 AGE-RELATED NUCLEAR CATARACT OF BOTH EYES: ICD-10-CM

## 2023-08-14 DIAGNOSIS — H40.003 GLAUCOMA SUSPECT OF BOTH EYES: ICD-10-CM

## 2023-08-14 DIAGNOSIS — H49.9 OPHTHALMOPLEGIA: ICD-10-CM

## 2023-08-14 PROBLEM — H40.009 GLAUCOMA SUSPECT: Status: ACTIVE | Noted: 2023-08-14

## 2023-08-14 PROCEDURE — 92060 SENSORIMOTOR EXAMINATION: CPT | Performed by: OPHTHALMOLOGY

## 2023-08-14 PROCEDURE — 92250 FUNDUS PHOTOGRAPHY W/I&R: CPT | Performed by: OPHTHALMOLOGY

## 2023-08-14 PROCEDURE — 99204 OFFICE O/P NEW MOD 45 MIN: CPT | Mod: 25 | Performed by: OPHTHALMOLOGY

## 2023-08-14 PROCEDURE — V2718 FRESNELL PRISM PRESS-ON LENS: HCPCS | Performed by: OPHTHALMOLOGY

## 2023-08-14 ASSESSMENT — VISUAL ACUITY
OS_CC: 20/40
OS_CC: J1
OD_CC: 20/25+2
OD_CC: J1+
METHOD: SNELLEN - LINEAR
CORRECTION_TYPE: GLASSES

## 2023-08-14 ASSESSMENT — TONOMETRY
OS_IOP_MMHG: 12
OD_IOP_MMHG: 9

## 2023-08-14 ASSESSMENT — CUP TO DISC RATIO
OD_RATIO: 0.0
OS_RATIO: 0.0

## 2023-08-14 ASSESSMENT — REFRACTION_MANIFEST
OD_SPHERE: +1.75
OS_SPHERE: +1.25
OS_AXIS: 022
OS_CYLINDER: +0.25
OD_AXIS: 161
METHOD_AUTOREFRACTION: 1
OD_CYLINDER: +0.25

## 2023-08-14 ASSESSMENT — CONF VISUAL FIELD
OS_NORMAL: 1
OD_NORMAL: 1
OD_SUPERIOR_TEMPORAL_RESTRICTION: 0
OS_INFERIOR_TEMPORAL_RESTRICTION: 0
OS_SUPERIOR_NASAL_RESTRICTION: 0
OS_INFERIOR_NASAL_RESTRICTION: 0
OD_SUPERIOR_NASAL_RESTRICTION: 0
OS_SUPERIOR_TEMPORAL_RESTRICTION: 0
OD_INFERIOR_NASAL_RESTRICTION: 0
OD_INFERIOR_TEMPORAL_RESTRICTION: 0

## 2023-08-14 ASSESSMENT — REFRACTION
OS_CYLINDER: +0.25
OS_SPHERE: +1.75
OD_AXIS: 102
OS_AXIS: 076
OD_CYLINDER: +0.25
OD_SPHERE: +1.50

## 2023-08-14 ASSESSMENT — REFRACTION_WEARINGRX
OD_ADD: +2.50
OD_SPHERE: +1.25
OS_CYLINDER: SPHERE
OS_ADD: +2.50
OS_SPHERE: +1.25
SPECS_TYPE: TRIFOCAL
OD_CYLINDER: SPHERE

## 2023-08-14 ASSESSMENT — SLIT LAMP EXAM - LIDS
COMMENTS: NORMAL
COMMENTS: NORMAL

## 2023-08-14 ASSESSMENT — ENCOUNTER SYMPTOMS
DOUBLE VISION: 1
HEADACHES: 1
EYE PAIN: 1

## 2023-08-14 ASSESSMENT — EXTERNAL EXAM - LEFT EYE: OS_EXAM: NORMAL

## 2023-08-14 ASSESSMENT — EXTERNAL EXAM - RIGHT EYE: OD_EXAM: NORMAL

## 2023-08-14 NOTE — ASSESSMENT & PLAN NOTE
8/14/2023-crowded optic nerve heads, no significant cupping, no evidence of pallor from the concussion

## 2023-08-14 NOTE — ASSESSMENT & PLAN NOTE
8/14/2023-complaint of the relatively cute onset of double vision especially for faraway after a concussion in December 2022 versus she slipped on some ice fell back and hit her head.  On examination her extraocular motility is full although she does have a 8 diopter intermittent esotropia that can build.  I did review the MRI scan done at [facility] dated 12/20/2022 which showed possible asymmetric thickening of the right medial rectus muscle.  Therefore this is the breakdown of an underlying strabismus esotropia, versus difficulty compensating for a possible early thyroid orbitopathy, versus myasthenia.  There was some variability in her measurement and she seemed to tolerate a 8 diopter base out prism which I placed over the left lens.  In addition I suggested she obtain thyroid antibodies as well as TSH receptor antibody and a myasthenia gravis panel.

## 2023-08-14 NOTE — PROGRESS NOTES
Peds/Neuro Ophthalmology:   Michael Hugo M.D.    Date & Time note created:    8/14/2023   12:03 PM     Referring MD / APRN:  Samina Mera M.D., No att. providers found    Patient ID:  Name:             Funmilayo Egan   YOB: 1955  Age:                 68 y.o.  female   MRN:               [id number]    Chief Complaint/Reason for Visit:     Diplopia      History of Present Illness:    Funmilayo Egan is a 68 y.o. female   Patient referred for diplopia.Double vision started after patient fell and got a concussion in December of 2022.Double vision off and on.+ eye strain and headaches.        Review of Systems:  Review of Systems   Eyes:  Positive for double vision and pain.   Neurological:  Positive for headaches.   All other systems reviewed and are negative.      Past Medical History:   Past Medical History:   Diagnosis Date    ASTHMA     remote hx    Bowel habit changes     Chest discomfort 04/2019    Coronary CT scan with LAD 11.4.     COPD     mild, not on meds for it    Hyperlipidemia     Hypertension     OSTEOPOROSIS     Shortness of breath 05/2019    Echocardiogram with normal LV size, LVEF 65%.Trace MR. Mild TR. RVSP 30mmHg.       Past Surgical History:  Past Surgical History:   Procedure Laterality Date    HEMORRHOIDECTOMY N/A 4/19/2021    Procedure: HEMORRHOIDECTOMY - INTERNAL AND EXTERNAL, COMPLEX OR EXTENSIVE.;  Surgeon: Estela Kenney M.D.;  Location: SURGERY [facility];  Service: General    OTHER  2020    Esophagus    ABDOMINAL HYSTERECTOMY TOTAL      APPENDECTOMY      HEMORRHOIDECTOMY         Current Outpatient Medications:  Current Outpatient Medications   Medication Sig Dispense Refill    amLODIPine (NORVASC) 2.5 MG Tab Take 1 Tablet by mouth every day.      atorvastatin (LIPITOR) 10 MG Tab Take 1 Tablet by mouth every evening.      sertraline (ZOLOFT) 50 MG Tab Take 1 Tablet by mouth every day.      raloxifene (EVISTA) 60 MG Tab Take 1 Tablet by mouth every day. 90 Tablet  3    polyethylene glycol/lytes (MIRALAX) 17 g Pack Take 17 g by mouth every day.      triamcinolone acetonide (KENALOG) 0.1 % Cream Apply 1 Application topically 2 times a day. 45 g 0    amLODIPine (NORVASC) 2.5 MG Tab Take 1 Tablet by mouth every day. 100 Tablet 2    sertraline (ZOLOFT) 50 MG Tab TAKE 1 TABLET BY MOUTH EVERY DAY 90 Tablet 3    lidocaine (LIDODERM) 5 % Patch Place 1 Patch on the skin every 24 hours as needed (pain). 30 Patch 2    hydrocortisone rectal (ANUSOL-HC) 2.5% Cream Insert 1 Application into the rectum 2 times a day. 28 g 0    TURMERIC PO Take 2 Tablets by mouth every morning.      Cholecalciferol (VITAMIN D-3) 125 MCG (5000 UT) Tab Take 5,000 Units by mouth every 7 days. On Monday      docusate sodium (COLACE) 100 MG Cap Take 100 mg by mouth 2 times a day.      tolterodine ER (DETROL-LA) 2 MG CAPSULE SR 24 HR Take 2 mg by mouth every day.      atorvastatin (LIPITOR) 10 MG Tab Take 1 Tablet by mouth every evening. 100 Tablet 3    cyclobenzaprine (FLEXERIL) 5 mg tablet Take 2 Tablets by mouth 3 times a day as needed for Muscle Spasms or Moderate Pain. (Patient not taking: Reported on 7/17/2023) 30 Tablet 0     No current facility-administered medications for this visit.       Allergies:  Allergies   Allergen Reactions    Sulfa Drugs Rash     7/15/19 welting urticaria/rash after taking Bactrim     Pcn [Penicillins] Itching     Blisters       Family History:  Family History   Problem Relation Age of Onset    Heart Disease Mother 65    Cancer Father     Heart Disease Brother        Social History:  Social History     Socioeconomic History    Marital status:      Spouse name: Not on file    Number of children: Not on file    Years of education: Not on file    Highest education level: Not on file   Occupational History    Not on file   Tobacco Use    Smoking status: Former     Packs/day: 0.00     Types: Cigarettes    Smokeless tobacco: Never    Tobacco comments:     20 y.a   Vaping Use     Vaping Use: Never used   Substance and Sexual Activity    Alcohol use: Not Currently     Alcohol/week: 2.4 oz     Types: 4 Glasses of wine per week     Comment: stopped in 2020    Drug use: Not Currently     Types: Oral     Comment: 4/17/2021    Sexual activity: Never   Other Topics Concern    Not on file   Social History Narrative    Retired     Social Determinants of Health     Financial Resource Strain: Not on file   Food Insecurity: Not on file   Transportation Needs: Not on file   Physical Activity: Not on file   Stress: Not on file   Social Connections: Not on file   Intimate Partner Violence: Not on file   Housing Stability: Not on file          Physical Exam:  Physical Exam    Oriented x 3  Weight/BMI: There is no height or weight on file to calculate BMI.  There were no vitals taken for this visit.    Base Eye Exam       Visual Acuity (Snellen - Linear)         Right Left    Dist cc 20/25+2 20/40    Near cc J1+ J1      Correction: Glasses              Tonometry (i care, 9:15 AM)         Right Left    Pressure 9 12              Pupils         Pupils    Right PERRL    Left PERRL              Visual Fields         Right Left     Full Full                  Additional Tests       Color         Right Left    Ishihara 9/9 9/9              Stereo       Fly: +    Animals: 3/3    Circles: 8/9                  Slit Lamp and Fundus Exam       External Exam         Right Left    External Normal Normal              Slit Lamp Exam         Right Left    Lids/Lashes Normal Normal    Conjunctiva/Sclera White and quiet White and quiet    Cornea Clear Clear    Anterior Chamber Deep and quiet Deep and quiet    Iris Round and reactive Round and reactive    Lens Nuclear sclerosis Nuclear sclerosis    Vitreous Normal Normal              Fundus Exam         Right Left    Disc Normal Normal    C/D Ratio 0.0 0.0    Macula Normal Normal    Vessels Normal Normal    Periphery Normal Normal                  Refraction       Wearing Rx          Sphere Cylinder Add    Right +1.25 Sphere +2.50    Left +1.25 Sphere +2.50      Age: 2yrs    Type: Trifocal              Manifest Refraction (Auto)         Sphere Cylinder Axis    Right +1.75 +0.25 161    Left +1.25 +0.25 022              Cycloplegic Refraction (Auto)         Sphere Cylinder Axis    Right +1.50 +0.25 102    Left +1.75 +0.25 076                    Pertinent Lab/Test/Imaging Review:      Assessment and Plan:     Ophthalmoplegia  8/14/2023-complaint of the relatively cute onset of double vision especially for faraway after a concussion in December 2022 versus she slipped on some ice fell back and hit her head.  On examination her extraocular motility is full although she does have a 8 diopter intermittent esotropia that can build.  I did review the MRI scan done at [facility] dated 12/20/2022 which showed possible asymmetric thickening of the right medial rectus muscle.  Therefore this is the breakdown of an underlying strabismus esotropia, versus difficulty compensating for a possible early thyroid orbitopathy, versus myasthenia.  There was some variability in her measurement and she seemed to tolerate a 8 diopter base out prism which I placed over the left lens.  In addition I suggested she obtain thyroid antibodies as well as TSH receptor antibody and a myasthenia gravis panel.    Age-related nuclear cataract of both eyes  8/14/2023-early nuclear sclerotic cataracts.  Not visually significant at this time    Glaucoma suspect  8/14/2023-crowded optic nerve heads, no significant cupping, no evidence of pallor from the concussion    45 minutes total time spent.  This included reviewing admission and MRI scan at [facility], prolonged prism meditation trial, discussing findings with the patient.  Formulating note in epic.    Michael Hugo M.D.

## 2023-08-17 ENCOUNTER — HOSPITAL ENCOUNTER (OUTPATIENT)
Dept: LAB | Facility: MEDICAL CENTER | Age: 68
End: 2023-08-17
Attending: OPHTHALMOLOGY
Payer: MEDICARE

## 2023-08-17 DIAGNOSIS — H49.9 OPHTHALMOPLEGIA: ICD-10-CM

## 2023-08-17 PROCEDURE — 83516 IMMUNOASSAY NONANTIBODY: CPT

## 2023-08-17 PROCEDURE — 36415 COLL VENOUS BLD VENIPUNCTURE: CPT

## 2023-08-17 PROCEDURE — 83520 IMMUNOASSAY QUANT NOS NONAB: CPT

## 2023-08-17 PROCEDURE — 86800 THYROGLOBULIN ANTIBODY: CPT

## 2023-08-17 PROCEDURE — 83519 RIA NONANTIBODY: CPT

## 2023-08-19 LAB
THYROGLOB AB SERPL-ACNC: <0.9 IU/ML (ref 0–4)
TSH RECEP AB SER-ACNC: <0.8 IU/L

## 2023-08-20 LAB
ACHR BIND AB SER-SCNC: 0 NMOL/L (ref 0–0.4)
ACHR BLOCK AB/ACHR TOTAL SFR SER: 13 % (ref 0–26)

## 2023-08-21 ENCOUNTER — DOCUMENTATION (OUTPATIENT)
Dept: OPHTHALMOLOGY | Facility: MEDICAL CENTER | Age: 68
End: 2023-08-21
Payer: MEDICARE

## 2023-08-21 DIAGNOSIS — H49.9 OPHTHALMOPLEGIA: ICD-10-CM

## 2023-08-21 NOTE — PROGRESS NOTES
8/14/2023-complaint of the relatively cute onset of double vision especially for faraway after a concussion in December 2022 versus she slipped on some ice fell back and hit her head.  On examination her extraocular motility is full although she does have a 8 diopter intermittent esotropia that can build.  I did review the MRI scan done at [facility] dated 12/20/2022 which showed possible asymmetric thickening of the right medial rectus muscle.  Therefore this is the breakdown of an underlying strabismus esotropia, versus difficulty compensating for a possible early thyroid orbitopathy, versus myasthenia.  There was some variability in her measurement and she seemed to tolerate a 8 diopter base out prism which I placed over the left lens.  In addition I suggested she obtain thyroid antibodies as well as TSH receptor antibody and a myasthenia gravis panel.  8/21/2023 - Ach receptor antibodies negative, TSH receptor and thyroglobulin ab negative

## 2023-08-21 NOTE — ASSESSMENT & PLAN NOTE
8/14/2023-complaint of the relatively cute onset of double vision especially for faraway after a concussion in December 2022 versus she slipped on some ice fell back and hit her head.  On examination her extraocular motility is full although she does have a 8 diopter intermittent esotropia that can build.  I did review the MRI scan done at [facility] dated 12/20/2022 which showed possible asymmetric thickening of the right medial rectus muscle.  Therefore this is the breakdown of an underlying strabismus esotropia, versus difficulty compensating for a possible early thyroid orbitopathy, versus myasthenia.  There was some variability in her measurement and she seemed to tolerate a 8 diopter base out prism which I placed over the left lens.  In addition I suggested she obtain thyroid antibodies as well as TSH receptor antibody and a myasthenia gravis panel.  8/21/2023 - Ach receptor antibodies negative, TSH receptor and thyroglobulin ab negative

## 2023-10-12 ENCOUNTER — OFFICE VISIT (OUTPATIENT)
Dept: OPHTHALMOLOGY | Facility: MEDICAL CENTER | Age: 68
End: 2023-10-12
Payer: MEDICARE

## 2023-10-12 DIAGNOSIS — H40.003 GLAUCOMA SUSPECT OF BOTH EYES: ICD-10-CM

## 2023-10-12 DIAGNOSIS — H25.13 AGE-RELATED NUCLEAR CATARACT OF BOTH EYES: ICD-10-CM

## 2023-10-12 DIAGNOSIS — H49.9 OPHTHALMOPLEGIA: ICD-10-CM

## 2023-10-12 PROCEDURE — 92060 SENSORIMOTOR EXAMINATION: CPT | Performed by: OPHTHALMOLOGY

## 2023-10-12 PROCEDURE — 99213 OFFICE O/P EST LOW 20 MIN: CPT | Performed by: OPHTHALMOLOGY

## 2023-10-12 RX ORDER — TRIAMCINOLONE ACETONIDE 1 MG/G
1 CREAM TOPICAL 2 TIMES DAILY
COMMUNITY
End: 2024-01-25

## 2023-10-12 RX ORDER — METHYLPREDNISOLONE 4 MG/1
TABLET ORAL
COMMUNITY
Start: 2023-08-02 | End: 2024-01-25

## 2023-10-12 RX ORDER — CLINDAMYCIN HYDROCHLORIDE 300 MG/1
CAPSULE ORAL
COMMUNITY
Start: 2023-08-10 | End: 2024-01-25

## 2023-10-12 ASSESSMENT — REFRACTION_FINALRX
OD_HPRISM: 4.0
OD_HBASE: OUT
OS_HPRISM: 4.0
OS_HBASE: OUT

## 2023-10-12 ASSESSMENT — REFRACTION_WEARINGRX
OS_ADD: +2.50
OS_SPHERE: +1.00
OS_ADD: +1.50
OD_SPHERE: +1.25
OD_ADD: +1.25
OD_CYLINDER: +0.00
OD_CYLINDER: SPHERE
OS_CYLINDER: +1.00
OD_AXIS: 0
SPECS_TYPE: TRIFOCAL
OS_AXIS: 099
SPECS_TYPE: PAL
OD_ADD: +2.50
OS_CYLINDER: SPHERE
OS_SPHERE: +1.25
OD_SPHERE: +1.25

## 2023-10-12 ASSESSMENT — SLIT LAMP EXAM - LIDS
COMMENTS: NORMAL
COMMENTS: NORMAL

## 2023-10-12 ASSESSMENT — VISUAL ACUITY
CORRECTION_TYPE: GLASSES
METHOD: SNELLEN - LINEAR
OD_CC: 20/25
OS_CC: 20/40

## 2023-10-12 ASSESSMENT — ENCOUNTER SYMPTOMS
BLURRED VISION: 1
DOUBLE VISION: 1

## 2023-10-12 ASSESSMENT — CONF VISUAL FIELD
OD_INFERIOR_TEMPORAL_RESTRICTION: 0
OS_INFERIOR_TEMPORAL_RESTRICTION: 0
OD_NORMAL: 1
OS_NORMAL: 1
OS_INFERIOR_NASAL_RESTRICTION: 0
OS_SUPERIOR_TEMPORAL_RESTRICTION: 0
OD_SUPERIOR_NASAL_RESTRICTION: 0
OD_INFERIOR_NASAL_RESTRICTION: 0
OS_SUPERIOR_NASAL_RESTRICTION: 0
OD_SUPERIOR_TEMPORAL_RESTRICTION: 0

## 2023-10-12 ASSESSMENT — TONOMETRY
OD_IOP_MMHG: 14
OS_IOP_MMHG: 13
IOP_METHOD: I-CARE

## 2023-10-12 ASSESSMENT — REFRACTION_MANIFEST
OS_AXIS: 055
OS_CYLINDER: +0.25
OS_SPHERE: +1.00
METHOD_AUTOREFRACTION: 1

## 2023-10-12 ASSESSMENT — EXTERNAL EXAM - LEFT EYE: OS_EXAM: NORMAL

## 2023-10-12 ASSESSMENT — EXTERNAL EXAM - RIGHT EYE: OD_EXAM: NORMAL

## 2023-10-12 ASSESSMENT — CUP TO DISC RATIO
OD_RATIO: 0.0
OS_RATIO: 0.0

## 2023-10-12 NOTE — ASSESSMENT & PLAN NOTE
8/14/2023-complaint of the relatively cute onset of double vision especially for faraway after a concussion in December 2022 versus she slipped on some ice fell back and hit her head.  On examination her extraocular motility is full although she does have a 8 diopter intermittent esotropia that can build.  I did review the MRI scan done at [facility] dated 12/20/2022 which showed possible asymmetric thickening of the right medial rectus muscle.  Therefore this is the breakdown of an underlying strabismus esotropia, versus difficulty compensating for a possible early thyroid orbitopathy, versus myasthenia.  There was some variability in her measurement and she seemed to tolerate a 8 diopter base out prism which I placed over the left lens.  In addition I suggested she obtain thyroid antibodies as well as TSH receptor antibody and a myasthenia gravis panel.  8/21/2023 - Ach receptor antibodies negative, TSH receptor and thyroglobulin ab negative  10/12/2020-did well with 8 diopter base out press on prism over the left eye.  We will give Rx ground in 4 base out OU

## 2023-10-12 NOTE — ASSESSMENT & PLAN NOTE
8/14/2023-early nuclear sclerotic cataracts.  Not visually significant at this time  10/12/2023-stable

## 2023-10-12 NOTE — PROGRESS NOTES
Peds/Neuro Ophthalmology:   Michael Hugo M.D.    Date & Time note created:    10/12/2023   11:58 AM     Referring MD / APRN:  Samina Mera M.D., No att. providers found    Patient ID:  Name:             Funmilayo Egan   YOB: 1955  Age:                 68 y.o.  female   MRN:               [id number]    Chief Complaint/Reason for Visit:     Other (2 month f.v. ophthalmoplegia OU)      History of Present Illness:    Funmilayo Egan is a 68 y.o. female   2 month f.v. for ophthalmoplegia OU. No headaches. No pain or discomfort OU. Pt states she uses OTC Systane eyedrops in the mornings. Pt states she doesn't get double vision much anymore unless she's trying really hard to focus on something. Pt states that her vision has decreased with correction since her last visit mostly in the left eye. Pt says she's using a magnifying glass to be able to read small prints and is interested in getting a new Rx today.         Review of Systems:  Review of Systems   Eyes:  Positive for blurred vision and double vision.        Ophthalmoplegia OU   All other systems reviewed and are negative.      Past Medical History:   Past Medical History:   Diagnosis Date    ASTHMA     remote hx    Bowel habit changes     Chest discomfort 04/2019    Coronary CT scan with LAD 11.4.     COPD     mild, not on meds for it    Hyperlipidemia     Hypertension     OSTEOPOROSIS     Shortness of breath 05/2019    Echocardiogram with normal LV size, LVEF 65%.Trace MR. Mild TR. RVSP 30mmHg.       Past Surgical History:  Past Surgical History:   Procedure Laterality Date    HEMORRHOIDECTOMY N/A 4/19/2021    Procedure: HEMORRHOIDECTOMY - INTERNAL AND EXTERNAL, COMPLEX OR EXTENSIVE.;  Surgeon: Estela Kenney M.D.;  Location: SURGERY [facility];  Service: General    OTHER  2020    Esophagus    ABDOMINAL HYSTERECTOMY TOTAL      APPENDECTOMY      HEMORRHOIDECTOMY         Current Outpatient Medications:  Current Outpatient Medications    Medication Sig Dispense Refill    amLODIPine (NORVASC) 2.5 MG Tab Take 1 Tablet by mouth every day.      raloxifene (EVISTA) 60 MG Tab Take 1 Tablet by mouth every day. 90 Tablet 3    polyethylene glycol/lytes (MIRALAX) 17 g Pack Take 17 g by mouth every day.      sertraline (ZOLOFT) 50 MG Tab TAKE 1 TABLET BY MOUTH EVERY DAY 90 Tablet 3    Cholecalciferol (VITAMIN D-3) 125 MCG (5000 UT) Tab Take 5,000 Units by mouth every 7 days. On Monday      docusate sodium (COLACE) 100 MG Cap Take 100 mg by mouth 2 times a day.      atorvastatin (LIPITOR) 10 MG Tab Take 1 Tablet by mouth every evening. 100 Tablet 3    methylPREDNISolone (MEDROL DOSEPAK) 4 MG Tablet Therapy Pack TAKE 6 TABLETS ON DAY 1 AS DIRECTED ON PACKAGE AND DECREASE BY 1 TAB EACH DAY FOR A TOTAL OF 6 DAYS      clindamycin (CLEOCIN) 300 MG Cap       triamcinolone acetonide (KENALOG) 0.1 % Cream Apply 1 Application topically 2 times a day.      atorvastatin (LIPITOR) 10 MG Tab Take 1 Tablet by mouth every evening.      sertraline (ZOLOFT) 50 MG Tab Take 1 Tablet by mouth every day.      triamcinolone acetonide (KENALOG) 0.1 % Cream Apply 1 Application topically 2 times a day. 45 g 0    amLODIPine (NORVASC) 2.5 MG Tab Take 1 Tablet by mouth every day. 100 Tablet 2    lidocaine (LIDODERM) 5 % Patch Place 1 Patch on the skin every 24 hours as needed (pain). 30 Patch 2    hydrocortisone rectal (ANUSOL-HC) 2.5% Cream Insert 1 Application into the rectum 2 times a day. 28 g 0    cyclobenzaprine (FLEXERIL) 5 mg tablet Take 2 Tablets by mouth 3 times a day as needed for Muscle Spasms or Moderate Pain. 30 Tablet 0    TURMERIC PO Take 2 Tablets by mouth every morning.      tolterodine ER (DETROL-LA) 2 MG CAPSULE SR 24 HR Take 2 mg by mouth every day.       No current facility-administered medications for this visit.       Allergies:  Allergies   Allergen Reactions    Sulfa Drugs Rash     7/15/19 welting urticaria/rash after taking Bactrim     Pcn [Penicillins]  Itching     Blisters       Family History:  Family History   Problem Relation Age of Onset    Heart Disease Mother 65    Cancer Father     Heart Disease Brother        Social History:  Social History     Socioeconomic History    Marital status:      Spouse name: Not on file    Number of children: Not on file    Years of education: Not on file    Highest education level: Not on file   Occupational History    Not on file   Tobacco Use    Smoking status: Former     Types: Cigarettes    Smokeless tobacco: Never    Tobacco comments:     20 y.a   Vaping Use    Vaping Use: Never used   Substance and Sexual Activity    Alcohol use: Not Currently     Alcohol/week: 2.4 oz     Types: 4 Glasses of wine per week     Comment: stopped in 2020    Drug use: Not Currently     Types: Oral     Comment: 4/17/2021    Sexual activity: Never   Other Topics Concern    Not on file   Social History Narrative    Retired     Social Determinants of Health     Financial Resource Strain: Not on file   Food Insecurity: Not on file   Transportation Needs: Not on file   Physical Activity: Not on file   Stress: Not on file   Social Connections: Not on file   Intimate Partner Violence: Not on file   Housing Stability: Not on file          Physical Exam:  Physical Exam    Oriented x 3  Weight/BMI: There is no height or weight on file to calculate BMI.  There were no vitals taken for this visit.    Base Eye Exam       Visual Acuity (Snellen - Linear)         Right Left    Dist cc 20/25 20/40      Correction: Glasses              Tonometry (i-care, 10:43 AM)         Right Left    Pressure 14 13              Visual Fields         Right Left     Full Full                  Additional Tests       Stereo       Fly: +    Animals: 3/3    Circles: 5/9                  Strabismus Exam         0 0 0   0 0 0                       0  0  E(T) 8 0  0                       0 0 0   0 0 0                       Slit Lamp and Fundus Exam       External Exam          Right Left    External Normal Normal              Slit Lamp Exam         Right Left    Lids/Lashes Normal Normal    Conjunctiva/Sclera White and quiet White and quiet    Cornea Clear Clear    Anterior Chamber Deep and quiet Deep and quiet    Iris Round and reactive Round and reactive    Lens Nuclear sclerosis Nuclear sclerosis    Vitreous Normal Normal              Fundus Exam         Right Left    Disc Normal Normal    C/D Ratio 0.0 0.0    Macula Normal Normal    Vessels Normal Normal    Periphery Normal Normal                  Refraction       Wearing Rx         Sphere Cylinder Axis Add    Right +1.25 +0.00 0 +1.25    Left +1.00 +1.00 099 +1.50      Age: 2yrs    Type: PAL              Wearing Rx #2         Sphere Cylinder Axis Add    Right +1.25 Sphere  +2.50    Left +1.25 Sphere  +2.50      Type: Trifocal              Manifest Refraction (Auto)         Sphere Cylinder Axis    Right       Left +1.00 +0.25 055              Final Rx         Sphere Cylinder Add Horz Prism    Right +1.25 Sphere +3.00 4.0 out    Left +1.25 Sphere +3.00 4.0 out      Type: Trifocal                    Pertinent Lab/Test/Imaging Review:      Assessment and Plan:     Ophthalmoplegia  8/14/2023-complaint of the relatively cute onset of double vision especially for faraway after a concussion in December 2022 versus she slipped on some ice fell back and hit her head.  On examination her extraocular motility is full although she does have a 8 diopter intermittent esotropia that can build.  I did review the MRI scan done at [facility] dated 12/20/2022 which showed possible asymmetric thickening of the right medial rectus muscle.  Therefore this is the breakdown of an underlying strabismus esotropia, versus difficulty compensating for a possible early thyroid orbitopathy, versus myasthenia.  There was some variability in her measurement and she seemed to tolerate a 8 diopter base out prism which I placed over the left lens.  In addition I  suggested she obtain thyroid antibodies as well as TSH receptor antibody and a myasthenia gravis panel.  8/21/2023 - Ach receptor antibodies negative, TSH receptor and thyroglobulin ab negative  10/12/2020-did well with 8 diopter base out press on prism over the left eye.  We will give Rx ground in 4 base out OU      Glaucoma suspect  8/14/2023-crowded optic nerve heads, no significant cupping, no evidence of pallor from the concussion  10/12/2023 -IOP stable    Age-related nuclear cataract of both eyes  8/14/2023-early nuclear sclerotic cataracts.  Not visually significant at this time  10/12/2023-stable        Michael Hugo M.D.

## 2023-10-25 ENCOUNTER — OFFICE VISIT (OUTPATIENT)
Dept: MEDICAL GROUP | Facility: MEDICAL CENTER | Age: 68
End: 2023-10-25
Payer: MEDICARE

## 2023-10-25 VITALS
HEIGHT: 62 IN | BODY MASS INDEX: 24.22 KG/M2 | TEMPERATURE: 97.2 F | WEIGHT: 131.6 LBS | SYSTOLIC BLOOD PRESSURE: 120 MMHG | HEART RATE: 88 BPM | DIASTOLIC BLOOD PRESSURE: 70 MMHG | OXYGEN SATURATION: 96 %

## 2023-10-25 DIAGNOSIS — M79.605 PAIN OF LEFT LOWER EXTREMITY: ICD-10-CM

## 2023-10-25 PROCEDURE — 3078F DIAST BP <80 MM HG: CPT | Performed by: STUDENT IN AN ORGANIZED HEALTH CARE EDUCATION/TRAINING PROGRAM

## 2023-10-25 PROCEDURE — 3074F SYST BP LT 130 MM HG: CPT | Performed by: STUDENT IN AN ORGANIZED HEALTH CARE EDUCATION/TRAINING PROGRAM

## 2023-10-25 PROCEDURE — 99213 OFFICE O/P EST LOW 20 MIN: CPT | Performed by: STUDENT IN AN ORGANIZED HEALTH CARE EDUCATION/TRAINING PROGRAM

## 2023-10-25 RX ORDER — CLINDAMYCIN HYDROCHLORIDE 300 MG/1
CAPSULE ORAL
COMMUNITY
End: 2024-01-25

## 2023-10-25 RX ORDER — RALOXIFENE HYDROCHLORIDE 60 MG/1
1 TABLET, FILM COATED ORAL
COMMUNITY
End: 2024-01-25

## 2023-10-25 ASSESSMENT — ENCOUNTER SYMPTOMS
CHILLS: 0
FEVER: 0
SHORTNESS OF BREATH: 0
ABDOMINAL PAIN: 0
NAUSEA: 0
VOMITING: 0
COUGH: 0
FOCAL WEAKNESS: 0

## 2023-10-25 ASSESSMENT — FIBROSIS 4 INDEX: FIB4 SCORE: 1.46

## 2023-10-25 NOTE — PROGRESS NOTES
"Subjective:     CC: leg pain    HPI:   Funmilayo presents today with    Reports left leg pain for 2 days, could not walk. Lidocaine patch helped.   4-5 years ago had similar problem, X ray showed \"bone curving\". Pain gets worse with walking. She fell 3 weeks ago on left arm. Had only left arm pain.    ROS:  Review of Systems   Constitutional:  Negative for chills and fever.   Respiratory:  Negative for cough and shortness of breath.    Cardiovascular:  Negative for chest pain and leg swelling.   Gastrointestinal:  Negative for abdominal pain, nausea and vomiting.   Musculoskeletal:  Positive for joint pain (left leg).   Neurological:  Negative for focal weakness.       Objective:     Exam:  /70 (BP Location: Left arm, Patient Position: Sitting, BP Cuff Size: Adult)   Pulse 88   Temp 36.2 °C (97.2 °F) (Temporal)   Ht 1.575 m (5' 2\")   Wt 59.7 kg (131 lb 9.6 oz)   SpO2 96%   BMI 24.07 kg/m²  Body mass index is 24.07 kg/m².    Physical Exam  Vitals reviewed.   HENT:      Head: Normocephalic.      Mouth/Throat:      Mouth: Mucous membranes are moist.      Pharynx: Oropharynx is clear.   Eyes:      Pupils: Pupils are equal, round, and reactive to light.   Cardiovascular:      Rate and Rhythm: Normal rate and regular rhythm.   Pulmonary:      Effort: Pulmonary effort is normal. No respiratory distress.      Breath sounds: No wheezing or rales.   Abdominal:      General: Abdomen is flat. Bowel sounds are normal. There is no distension.      Tenderness: There is no abdominal tenderness. There is no guarding.   Musculoskeletal:         General: Tenderness (left calf with pressure) present.   Skin:     General: Skin is warm.   Neurological:      General: No focal deficit present.      Mental Status: She is alert and oriented to person, place, and time.       Assessment & Plan:     68 y.o. female with the following -     1. Pain of left lower extremity  Denies recent long flight or drive. No h/o DVT. Has pain with " pressure on left calf. Need to r/o DVT.  - US-EXTREMITY ARTERY LOWER BILAT W/JAYNE (COMBO); Future  - US-EXTREMITY VENOUS LOWER UNILAT LEFT; Future  - OTC tylenol, ibuprofen prn    Follow up with PCP Dr. Mera  No follow-ups on file.    Please note that this dictation was created using voice recognition software. I have made every reasonable attempt to correct obvious errors, but I expect that there are errors of grammar and possibly content that I did not discover before finalizing the note.

## 2023-11-02 ENCOUNTER — HOSPITAL ENCOUNTER (OUTPATIENT)
Dept: RADIOLOGY | Facility: MEDICAL CENTER | Age: 68
End: 2023-11-02
Attending: STUDENT IN AN ORGANIZED HEALTH CARE EDUCATION/TRAINING PROGRAM
Payer: MEDICARE

## 2023-11-02 DIAGNOSIS — M79.605 PAIN OF LEFT LOWER EXTREMITY: ICD-10-CM

## 2023-11-02 PROCEDURE — 93922 UPR/L XTREMITY ART 2 LEVELS: CPT

## 2023-11-10 ENCOUNTER — HOSPITAL ENCOUNTER (OUTPATIENT)
Dept: RADIOLOGY | Facility: MEDICAL CENTER | Age: 68
End: 2023-11-10
Attending: STUDENT IN AN ORGANIZED HEALTH CARE EDUCATION/TRAINING PROGRAM
Payer: MEDICARE

## 2023-11-10 DIAGNOSIS — M79.605 PAIN OF LEFT LOWER EXTREMITY: ICD-10-CM

## 2023-11-10 PROCEDURE — 93971 EXTREMITY STUDY: CPT | Mod: LT

## 2023-12-01 ENCOUNTER — OFFICE VISIT (OUTPATIENT)
Dept: NEUROLOGY | Facility: MEDICAL CENTER | Age: 68
End: 2023-12-01
Attending: PSYCHIATRY & NEUROLOGY
Payer: MEDICARE

## 2023-12-01 VITALS
WEIGHT: 132.28 LBS | OXYGEN SATURATION: 96 % | RESPIRATION RATE: 14 BRPM | DIASTOLIC BLOOD PRESSURE: 80 MMHG | HEIGHT: 62 IN | HEART RATE: 80 BPM | SYSTOLIC BLOOD PRESSURE: 130 MMHG | BODY MASS INDEX: 24.34 KG/M2

## 2023-12-01 DIAGNOSIS — F07.81 POST CONCUSSIVE SYNDROME: ICD-10-CM

## 2023-12-01 DIAGNOSIS — H53.2 DIPLOPIA: ICD-10-CM

## 2023-12-01 DIAGNOSIS — G31.84 MILD COGNITIVE IMPAIRMENT: Primary | ICD-10-CM

## 2023-12-01 PROCEDURE — 3079F DIAST BP 80-89 MM HG: CPT | Performed by: PSYCHIATRY & NEUROLOGY

## 2023-12-01 PROCEDURE — 99212 OFFICE O/P EST SF 10 MIN: CPT | Performed by: PSYCHIATRY & NEUROLOGY

## 2023-12-01 PROCEDURE — 99215 OFFICE O/P EST HI 40 MIN: CPT | Performed by: PSYCHIATRY & NEUROLOGY

## 2023-12-01 PROCEDURE — 3075F SYST BP GE 130 - 139MM HG: CPT | Performed by: PSYCHIATRY & NEUROLOGY

## 2023-12-01 PROCEDURE — G2212 PROLONG OUTPT/OFFICE VIS: HCPCS | Performed by: PSYCHIATRY & NEUROLOGY

## 2023-12-01 ASSESSMENT — MONTREAL COGNITIVE ASSESSMENT (MOCA)
2. COPY DRAWING: 0/1
1. ALTERNATING TRAIL MAKING: 1/1
10. [FLUENCY] NAME WORDS STARTING WITH DESIGNATED LETTER: 1/1
ORIENTATION SUBSCORE: 6/6
WHAT IS THE VERSION OF MOCA ADMINISTERED: 8.1
4. NAME EACH OF THE THREE ANIMALS SHOWN: 3/3
11. FOR EACH PAIR OF WORDS, WHAT CATEGORY DO THEY BELONG TO (OUT OF 2): 1/2
3. DRAW A CLOCK: CONTOUR, NUMBERS, HANDS: 3/3
DELAYED RECALL SUBSCORE: 4/5
5. MEMORY TRIALS: SECOND TRIAL
7. [VIGILENCE] TAP WHEN HEARING DESIGNATED LETTER: 1/1
8. SERIAL SUBTRACTION OF 7S: 4 OR 5/5
WHAT IS THE TOTAL SCORE (OUT OF 30): 27
6. READ LIST OF DIGITS [FORWARD/BACKWARD]: 2/2
9. REPEAT EACH SENTENCE: 2/2

## 2023-12-01 ASSESSMENT — PATIENT HEALTH QUESTIONNAIRE - PHQ9: CLINICAL INTERPRETATION OF PHQ2 SCORE: 0

## 2023-12-01 ASSESSMENT — FIBROSIS 4 INDEX: FIB4 SCORE: 1.46

## 2023-12-01 NOTE — PROGRESS NOTES
"Neuro follow up visit:  Post Traumatic Headache due to accidental mechanical fall in Dec 2022 at her home with post traumatic diplopia, mild cognitive impairment and prior diplopic related headaches (resolving very soon after having special lense used to reduce diplopic effect).    Saw me last in June 2023:    Funmilayo Egan 68 y.o. right handed woman who is from [place] but who has been in the [place] for over 40  years.  Lives with .  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     She described 1 week before Christmas 2022 and went outside her house and slipped on black ice and was going to open car door. It was about 6 pm in the evening and by her own view she had both legs went out in front of her  (on the ice) and her head hit back of head (no clear LOC or prodrome).  to her to [facility])- 3 days and 4 months.      While she was in the hospital she was having mild diplopia and diffuse headaches (bilateral).  Headaches still bothersome about 3 days a week for hours out a time.   No ongoing nausea,emesis,photophobia,speech-language disturbances ongoing or evolving.    Of note she stopped her Toltterodim (for prior bladder issues) about 4-6 weeks ago and within 2 weeks of stopping this medication she is able to concentrate better and \"if I want to write stuff and do stuff the information comes easier).     Saw Dr. Mike Hugo ([facility]) 2 times after seeing me in June 2023 due to persisting   Double vision  since the head bonk as above without any clear decline. If she looks at me she recognizes there are 2 images (the \"fake one is to the right\" of the real image.She has double vision to multiple directions of gaze which is gone after she closes one eye.     Funmilayo has no evolving gait-balance difficulties in the last 3 months.    She has not had any " "dysarthria,dysphagia,vertiginous events, tinnitus in the recent months.     There has been  ongoing subjective weakness of the limbs or sensory disturbances of the limbs x 4 in the last 3 months or ongoing.     No prior history of concussion(s) or seizure type events (episodes) described to me today in review.     There are no  paranoia,delusions,visual or auditory hallucinations in the recent months.     Denies feeling or being anxious,depressed or suicidal in the last 3-6 months.     There have been no REM Behavioral symptoms and and averages 7-8 hours of sleep most nights in the recent 3-6 months or so.     Before the 12/2022 concussive event, she was being told by people at work \"I just told you a few minutes ago that\" but this issue was very mild.  has told her on multiple occasions that \"I just told that\" within 2 hours or so.        Infrequent smoking - quit in 1993.  No significant alcohol use in adult life.        Family Hx: Sister (dementia- onset in mid 60's and had sixto dementia issue by 60's)> now age 73.  Parents: no significant cognitive/memory difficulties.      Patient Active Problem List    Diagnosis Date Noted    Ophthalmoplegia 08/14/2023    Age-related nuclear cataract of both eyes 08/14/2023    Glaucoma suspect 08/14/2023    Concussion with no loss of consciousness 06/01/2023    Diplopia 06/01/2023    Traumatic head injury less than 3 months ago 12/29/2022    Intractable neck and back pain due to muscle spasms 12/28/2022    Bilateral foot pain 05/17/2022    Scalp irritation 05/17/2022    Gastroesophageal reflux disease without esophagitis 09/10/2021    Generalized abdominal pain 12/21/2020    Urge incontinence of urine 04/12/2019    Hair pulling 03/12/2019    Essential hypertension 03/12/2019    Age-related osteoporosis without current pathological fracture 03/12/2019    Dyslipidemia 03/12/2019       Past medical history:   Past Medical History:   Diagnosis Date    ASTHMA     remote hx "    Bowel habit changes     Chest discomfort 04/2019    Coronary CT scan with LAD 11.4.     COPD     mild, not on meds for it    Hyperlipidemia     Hypertension     OSTEOPOROSIS     Shortness of breath 05/2019    Echocardiogram with normal LV size, LVEF 65%.Trace MR. Mild TR. RVSP 30mmHg.       Past surgical history:   Past Surgical History:   Procedure Laterality Date    HEMORRHOIDECTOMY N/A 4/19/2021    Procedure: HEMORRHOIDECTOMY - INTERNAL AND EXTERNAL, COMPLEX OR EXTENSIVE.;  Surgeon: Estela Kenney M.D.;  Location: SURGERY [facility];  Service: General    OTHER  2020    Esophagus    ABDOMINAL HYSTERECTOMY TOTAL      APPENDECTOMY      HEMORRHOIDECTOMY           Social history:   Social History     Socioeconomic History    Marital status:      Spouse name: Not on file    Number of children: Not on file    Years of education: Not on file    Highest education level: Not on file   Occupational History    Not on file   Tobacco Use    Smoking status: Former     Types: Cigarettes    Smokeless tobacco: Never    Tobacco comments:     20 y.a   Vaping Use    Vaping Use: Never used   Substance and Sexual Activity    Alcohol use: Not Currently     Alcohol/week: 2.4 oz     Types: 4 Glasses of wine per week     Comment: stopped in 2020    Drug use: Not Currently     Types: Oral     Comment: 4/17/2021    Sexual activity: Never   Other Topics Concern    Not on file   Social History Narrative    Retired     Social Determinants of Health     Financial Resource Strain: Not on file   Food Insecurity: Not on file   Transportation Needs: Not on file   Physical Activity: Not on file   Stress: Not on file   Social Connections: Not on file   Intimate Partner Violence: Not on file   Housing Stability: Not on file       Family history:   Family History   Problem Relation Age of Onset    Heart Disease Mother 65    Cancer Father     Heart Disease Brother          Current medications:   Current Outpatient Medications   Medication  "   clindamycin (CLEOCIN) 300 MG Cap    clindamycin (CLEOCIN) 300 MG Cap    raloxifene (EVISTA) 60 MG Tab    polyethylene glycol/lytes (MIRALAX) 17 g Pack    triamcinolone acetonide (KENALOG) 0.1 % Cream    amLODIPine (NORVASC) 2.5 MG Tab    sertraline (ZOLOFT) 50 MG Tab    lidocaine (LIDODERM) 5 % Patch    TURMERIC PO    Cholecalciferol (VITAMIN D-3) 125 MCG (5000 UT) Tab    docusate sodium (COLACE) 100 MG Cap    tolterodine ER (DETROL-LA) 2 MG CAPSULE SR 24 HR    atorvastatin (LIPITOR) 10 MG Tab    raloxifene (EVISTA) 60 MG Tab    methylPREDNISolone (MEDROL DOSEPAK) 4 MG Tablet Therapy Pack    triamcinolone acetonide (KENALOG) 0.1 % Cream    sertraline (ZOLOFT) 50 MG Tab    hydrocortisone rectal (ANUSOL-HC) 2.5% Cream    cyclobenzaprine (FLEXERIL) 5 mg tablet     No current facility-administered medications for this visit.       Medication Allergy:  Allergies   Allergen Reactions    Sulfa Drugs Rash and Unspecified     7/15/19 welting urticaria/rash after taking Bactrim     Penicillins Itching and Unspecified     Blisters           Physical examination:   Vitals:    23 1517   BP: 130/80   BP Location: Left arm   Patient Position: Sitting   BP Cuff Size: Adult   Pulse: 80   Resp: 14   SpO2: 96%   Weight: 60 kg (132 lb 4.4 oz)   Height: 1.575 m (5' 2\")       Normal cephalic atraumatic.  There is full range of movement around the neck in all directions without restrictions or discrete pain evoked triggers.  No lower extremity edema.      Neurological  Exam:      [place] Cognitive Assessment (MOCA) Version 7.1    Years of Education: High School Graduate> 1st to 3rd  in [place].    TOTAL SCORE: 27/30  (to be scanned into the MEDIA section in the E.M.R.)    [place] Cognitive Assessment (MOCA) Version Number: 8.1   VISUOSPACIAL / EXECUTIVE   Clock Drawing: 3/3  Spatial Drawin/1  Cube Drawin/1 (slight abnormalities on right side of cube)    NAMING  Naming: 3/3    MEMORY  Memory: Second " "trial    ATTENTION  Digits: 2/2  Letters: 1/1  Subtraction: 4 or 5/5    LANGUAGE  Repeat Phrases: 2/2  Fluency: 1/1    ABSTRACTION  Abstraction: 1/2 (had dfficulty with \"watch to ruler\")    DELAYED RECALL  Recall words: 4/5  Category Cue (if applicable):    Multiple Choice Cue (if applicable):     ORIENTATION  Orientation: 6/6    Add 1 point if less than or equal to 12 yr education level:     MOCA TOTAL SCORE:  27 /30  Biomechanical/Visual Limitations (if applicable):            Mental status: Awake, alert and fully oriented to person, place, time, and situation. Normal attention and concentration.  Did not appear/act combative,irritable,anxious,paranoid/delusional or aggressive to or with me.    Speech and language: Speech is fluent without errors, clear, intact to repetition, and intact to naming.     Follows 3 step motor commands in sequence without significant delay and correctly.    Cranial nerve exam:  II: Pupils are equally round and reactive to light. Visual fields are intact by confrontation.  III, IV, VI:     Diplopia  with glasses off in  mid gaze, mid and up but most prominent when looking to left and left and the upper quadrant.    V: Sensation to light touch is normal over V1-3 distributions bilaterally.  .  VII: Facial movements are symmetrical. There is no facial droop. .  VIII: Hearing intact to soft speech and finger rub bilaterally  IX: Palate elevates symmetrically, uvula is midline. Dysarthria is not present.  XI: Shoulder shrug are symmetrical and strong.   XII: Tongue protrudes midline.      Motor exam:  Muscle tone is normal in all 4 limbs. and No abnormal movements appreciated.    Muscle strength:    Neck Flexors/Extensors: 5/5       Right  Left  Deltoid   5/5  5/5      Biceps   5/5  5/5  Triceps              5/5  5/5   Wrist extensors 5/5  5/5  Wrist flexors  5/5  5/5     5/5  5/5  Interossei  5/5  5/5  Thenar (APB)  5/5  5/5   Hip " flexors  5/5  5/5  Quadriceps  5/5  5/5    Hamstrings  5/5  5/5  Dorsiflexors  5/5  5/5  Plantarflexors  5/5  5/5  Toe extension  5/5  5/5        Reflexes:       Right  Left  Biceps   2/4  2/4  Triceps              2/4  2/4  Brachioradialis 2/4  2/4  Knee jerk  2/4  2/4  Ankle jerk  2/4  2/4     Frontal release signs are absent    bilaterally toes are downgoing to plantar stimulation..    Coordination (finger-to-nose, heel/knee/shin, rapid alternating movements) was normal.     There was no ataxia, no tremors, and no dysmetria.     Station and gait > easily stands up from exam chair without retropulsion,veering,leaning,swaying (to either side).       NEUROIMAGIN/29/2022 4:09 PM     HISTORY/REASON FOR EXAM:  Fall 10 days ago with neck pain     TECHNIQUE/EXAM DESCRIPTION:     T1 sagittal, T2 axial, flair coronal, T1 coronal, and diffusion-weighted axial images were obtained of the brain.     COMPARISON: CT brain 2022.     FINDINGS:     Diffusion-weighted images are normal. No acute infarct is identified. Gradient-echo images are normal. Nonspecific T2 hyperintensities are noted in the periventricular and deep white matter, most likely related to chronic microvascular ischemia.     There is no evidence of intracranial mass or mass effect. No evidence of midline shift.  There is no evidence of focal cerebral edema.  There are no extra axial collections.  Visualized intracranial arterial flow voids are within normal limits.  Bone marrow signal in the calvarium is within normal limits.  Included portions of the paranasal sinuses are within normal limits.  Included portions of the mastoid air cells are within normal limits.  Included portions of the orbits are within normal limits     IMPRESSION:        No acute intracranial process.     Nonspecific T2 hyperintensities are noted in the periventricular and deep white matter, most likely related to chronic microvascular ischemia.           Exam Ended:  "12/29/22  5:36 PM Last Resulted: 12/30/22  9:03 AM               Impression/Plans/Recommendations:      Mild Cognitive Impairment- chronic and primarily onset of cognitive symptoms after accidental slip and fall on black ice in December 2022 just outside her home.    - Post Concussive Syndrome- still quite mild with persistent but chronic diplopia,period frontal headaches (not daily) and memory disturbances (note memory disturbances present for last 1-2 years).    Compensated but not back to normal at this point.    ADL's seem preserved in review today.     Brain MRI reviewed from 12/2022.    MOCA score of 27/30 today which is overall encouraging.    She still some difficulties when going to her garage or certain locations to get something and she has \"try hard\" to remember where she is going or why she is going there, she typically can remember these locations.    She still has noticed when writing something \"like a card\" , the words she wants to say does not come to her mind easily.     2.  Diplopia- since head trauma (12/2022)- much improved after placing special lense (left eye).    No other cranial neuropathies identified on exam such as involving the 5th,7th,8th cranial nerves.       Funmilayo never  had diplopia before the 12/2022 event.  She has no  ptosis.    Saw Dr. Mike Hugo (Neuro Eye MD) for evaluation of post traumatic diplopia and she has had special left lense which has resolved or compensated her double vision.      Even the double vision is better without glasses on in that the images are closer together.       3. Post Traumatic Headaches- clearly improved and resolved after treatment for diplopia for with special lense placed.            Goals going forwards include:     A. Paying attention to one's risk factors and reducing their prevalence or potential impact on one's changing memory/thinking> an excellent example would be to stop smoking, reduce or eliminate alcohol use (depending on the " amount and frequency of usage), maintain good blood pressure control by buying a digital arm blood pressure cuff such as an OMRON Series 3 or 5 and checking one's blood pressure atleast 3 days per week (in the am and early afternoon) that the numbers are under 140/90 and working as needed with the primary care doctor  to optimize blood pressure control).        B. Encouraging proper sleep hygiene which for most adults is 7 to 8 hours of uninterrupted sleep per night.       C. Encouraging some form of exercise preferable aerobic forms to be done (4 to 5 days per week- 30 minutes minimum per day)> 150 minutes per week as a goal. Example activities could include fast walking (up a slight incline), jogging, cycling (road or stationary), swimming,tennis. Essentially, even basic walking on a flat surface or a treadmill would be better than doing nothing.     D. Maintaining or forming new social contacts with family and friends  and a positive attitude despite the concerns and/or ongoing issues with thinking and/or memory.     E. Eating well which means a diet low in salt  (under 2 grams per day), sugar and saturated fat.     F. Maintaining one's BMI (Body Mass Index) under 30.     G. Brain Health issues reviewed today.     H.  Referral to our PhD Neuropsychologist for a more formal evaluation of cognitive ability.     I. At this time will hold off on headache reducing medications given side effect risks of these medications (TCA, Gabapentin).     J. At this time I do not feel another Brain imaging test is needed.       I have performed  a history and physical exam and a directed /focused  ROS today.    Total time spent today or this patient's care was  60  minutes  and included reviewing  the diagnostic workup to date (such as labs and imaging as well as interpreting such tests relevant to this patient's neurological condition),  reviewing/obtaining separately obtained history (from patient and/or accompanying ffamily  member)  for today's neurological problem(s) ,counseling and educating the patient and family member on issues related to cognition/memory and cognitive health factors and documenting  the clinical information in the EMR.    Follow up with me PRN at this point.        Olman Ross MD  [facility].   [facility]

## 2023-12-08 ENCOUNTER — OFFICE VISIT (OUTPATIENT)
Dept: MEDICAL GROUP | Facility: IMAGING CENTER | Age: 68
End: 2023-12-08
Payer: MEDICARE

## 2023-12-08 VITALS
BODY MASS INDEX: 24.92 KG/M2 | HEART RATE: 83 BPM | DIASTOLIC BLOOD PRESSURE: 70 MMHG | TEMPERATURE: 97.8 F | RESPIRATION RATE: 16 BRPM | OXYGEN SATURATION: 98 % | HEIGHT: 62 IN | WEIGHT: 135.4 LBS | SYSTOLIC BLOOD PRESSURE: 130 MMHG

## 2023-12-08 DIAGNOSIS — M79.605 PAIN OF LEFT LOWER EXTREMITY: ICD-10-CM

## 2023-12-08 DIAGNOSIS — F41.9 ANXIETY: ICD-10-CM

## 2023-12-08 DIAGNOSIS — E78.5 DYSLIPIDEMIA: ICD-10-CM

## 2023-12-08 DIAGNOSIS — R19.8 GI PROBLEM: ICD-10-CM

## 2023-12-08 DIAGNOSIS — M25.552 LEFT HIP PAIN: ICD-10-CM

## 2023-12-08 DIAGNOSIS — M70.62 TROCHANTERIC BURSITIS OF LEFT HIP: ICD-10-CM

## 2023-12-08 DIAGNOSIS — Z87.19 HISTORY OF BARRETT'S ESOPHAGUS: ICD-10-CM

## 2023-12-08 DIAGNOSIS — Z13.1 SCREENING FOR DIABETES MELLITUS: ICD-10-CM

## 2023-12-08 DIAGNOSIS — H53.2 DIPLOPIA: ICD-10-CM

## 2023-12-08 DIAGNOSIS — R41.3 MEMORY PROBLEM: ICD-10-CM

## 2023-12-08 DIAGNOSIS — M81.0 AGE-RELATED OSTEOPOROSIS WITHOUT CURRENT PATHOLOGICAL FRACTURE: ICD-10-CM

## 2023-12-08 PROCEDURE — 99214 OFFICE O/P EST MOD 30 MIN: CPT | Performed by: FAMILY MEDICINE

## 2023-12-08 PROCEDURE — 3075F SYST BP GE 130 - 139MM HG: CPT | Performed by: FAMILY MEDICINE

## 2023-12-08 PROCEDURE — 3078F DIAST BP <80 MM HG: CPT | Performed by: FAMILY MEDICINE

## 2023-12-08 ASSESSMENT — PAIN SCALES - GENERAL: PAINLEVEL: NO PAIN

## 2023-12-08 ASSESSMENT — FIBROSIS 4 INDEX: FIB4 SCORE: 1.46

## 2023-12-08 NOTE — PROGRESS NOTES
SUBJECTIVE:    Chief Complaint   Patient presents with    Results     US results- Pt states her left leg pain has gotten better, she did PT.     Requesting Labs     Kidney blood work        HPI:     Funmilayo gEan is a 68 y.o. female with hypertension, dyslipidemia, osteoporosis, and gerd here for follow up of results.      Leg is better- left leg pain. That day was painful.   Now feels left lateral hip inflammation. If sits then getting up, hard to walk. But keeps walking and is fine.   Had fall, has step above that pulls down. Notes as she was coming down steps missed last steps. Left arm hit board, left lateral hip hit as well, but did not have it evaluated. Has been able to walk.     GI issues- had saeed's esophagus- Dr. Kauffman evaluated, then saw another GI doctor. Had lost weight. Has had her esophagus opened. Liver issues, need follow up. Some nausea and burping at times. Taking omeprazole daily.     Hx of concussion and diplopia. States he eyes got fixed.   Neurology sent to neuro-ophthalmology, glasses corrects double vision.  Has been referred to other provider for consultation.   Memory- slowly feeling a little better.   Anxiety -stable on zoloft.   Osteoporosis- on evista, 2021 dexa.       ROS:  No recent fevers or chills. No nausea or vomiting. No diarrhea. No chest pains or shortness of breath. No lower extremity edema.    Current Outpatient Medications on File Prior to Visit   Medication Sig Dispense Refill    clindamycin (CLEOCIN) 300 MG Cap TAKE 1 CAPSULE BY MOUTH 4 TIMES A DAY UNTIL FINISHED      raloxifene (EVISTA) 60 MG Tab Take 1 Tablet by mouth every day.      methylPREDNISolone (MEDROL DOSEPAK) 4 MG Tablet Therapy Pack       clindamycin (CLEOCIN) 300 MG Cap       triamcinolone acetonide (KENALOG) 0.1 % Cream Apply 1 Application topically 2 times a day.      sertraline (ZOLOFT) 50 MG Tab Take 1 Tablet by mouth every day.      raloxifene (EVISTA) 60 MG Tab Take 1 Tablet by mouth every day.  90 Tablet 3    polyethylene glycol/lytes (MIRALAX) 17 g Pack Take 17 g by mouth every day.      triamcinolone acetonide (KENALOG) 0.1 % Cream Apply 1 Application topically 2 times a day. 45 g 0    amLODIPine (NORVASC) 2.5 MG Tab Take 1 Tablet by mouth every day. 100 Tablet 2    sertraline (ZOLOFT) 50 MG Tab TAKE 1 TABLET BY MOUTH EVERY DAY 90 Tablet 3    lidocaine (LIDODERM) 5 % Patch Place 1 Patch on the skin every 24 hours as needed (pain). 30 Patch 2    hydrocortisone rectal (ANUSOL-HC) 2.5% Cream Insert 1 Application into the rectum 2 times a day. 28 g 0    cyclobenzaprine (FLEXERIL) 5 mg tablet Take 2 Tablets by mouth 3 times a day as needed for Muscle Spasms or Moderate Pain. 30 Tablet 0    TURMERIC PO Take 2 Tablets by mouth every morning.      Cholecalciferol (VITAMIN D-3) 125 MCG (5000 UT) Tab Take 5,000 Units by mouth every 7 days. On Monday      docusate sodium (COLACE) 100 MG Cap Take 100 mg by mouth 2 times a day.      atorvastatin (LIPITOR) 10 MG Tab Take 1 Tablet by mouth every evening. 100 Tablet 3     No current facility-administered medications on file prior to visit.       Allergies   Allergen Reactions    Sulfa Drugs Rash and Unspecified     7/15/19 welting urticaria/rash after taking Bactrim     Penicillins Itching and Unspecified     Blisters       Patient Active Problem List    Diagnosis Date Noted    Post concussive syndrome 12/01/2023    Mild cognitive impairment 12/01/2023    Ophthalmoplegia 08/14/2023    Age-related nuclear cataract of both eyes 08/14/2023    Glaucoma suspect 08/14/2023    Concussion with no loss of consciousness 06/01/2023    Diplopia 06/01/2023    Traumatic head injury less than 3 months ago 12/29/2022    Intractable neck and back pain due to muscle spasms 12/28/2022    Bilateral foot pain 05/17/2022    Scalp irritation 05/17/2022    Gastroesophageal reflux disease without esophagitis 09/10/2021    Generalized abdominal pain 12/21/2020    Urge incontinence of urine  "04/12/2019    Hair pulling 03/12/2019    Essential hypertension 03/12/2019    Age-related osteoporosis without current pathological fracture 03/12/2019    Dyslipidemia 03/12/2019       Past Medical History:   Diagnosis Date    ASTHMA     remote hx    Bowel habit changes     Chest discomfort 04/2019    Coronary CT scan with LAD 11.4.     COPD     mild, not on meds for it    Hyperlipidemia     Hypertension     OSTEOPOROSIS     Shortness of breath 05/2019    Echocardiogram with normal LV size, LVEF 65%.Trace MR. Mild TR. RVSP 30mmHg.         OBJECTIVE:   /70 (BP Location: Left arm, Patient Position: Sitting, BP Cuff Size: Adult)   Pulse 83   Temp 36.6 °C (97.8 °F) (Temporal)   Resp 16   Ht 1.575 m (5' 2\")   Wt 61.4 kg (135 lb 6.4 oz)   SpO2 98%   BMI 24.76 kg/m²   General: Well-developed well-nourished female, no acute distress  Neck: supple, no lymphadenopathy- cervical or supraclavicular, no thyromegaly  Cardiovascular: regular rate and rhythm, no murmurs, gallops, rubs  Lungs: clear to auscultation bilaterally, no wheezes, crackles, or rhonchi  Abdomen: +bowel sounds, soft, nontender, nondistended, no rebound, no guarding, no hepatosplenomegaly  Extremities: no cyanosis, clubbing, edema. Left lateral hip region of trochanteric bursa with slight TTP, adequate ROM and strength, no pain with weight bearing.   Skin: Warm and dry  Psych: appropriate mood and affect      Narrative & Impression     11/10/2023 2:45 PM     HISTORY/REASON FOR EXAM:  Pain in Limb with Pressure  Left lower extremity pain and pressure.     TECHNIQUE/EXAM DESCRIPTION:  Using both color and pulsed-wave Doppler imaging, multiple images were obtained of the left lower extremity from the common femoral vein origin distally through the popliteal trifurcation.  Graded compression was used to demonstrate a patent lumen.     COMPARISON:  None.     FINDINGS:     REAL-TIME GRAY-SCALE IMAGING:  Real-time gray-scale imaging reveals no evidence " of focal wall thickening.     COLOR AND DUPLEX DOPPLER IMAGING:  There is no evidence of luminal thrombus.  There is normal augmentation to flow and respiratory variation.     IMPRESSION:     No evidence of left lower extremity deep venous thrombosis.           Exam Ended: 11/10/23  3:02 PM Last Resulted: 11/10/23  3:22 PM         Narrative & Impression               Vascular Laboratory   Conclusions   No evidence of arterial insufficiency.      ASSESSMENT/PLAN:    68 y.o.female     1. Pain of left lower extremity - notes improvement. Otherwise as below. Monitor. Follow up as needed if no improvements or worsening of symptoms.        2. Left hip pain - stable.   Recommend routine conditioning exercise/stretching. Consider physical therapy as needed. OTC medication as needed. Monitor.        3. Trochanteric bursitis of left hip - as above.        4. GI problem - as below. Continue current therapy. Modify diet.  Follow up with GI.  Referral to Gastroenterology    CBC WITH DIFFERENTIAL    Comp Metabolic Panel      5. History of Sue's esophagus   Continue omeprazole daily.  Referral to Gastroenterology      6. Diplopia - notes improvements. Followed by neuro-ophthalmology.        7. Memory problem - improving, stable. Monitor.        8. Anxiety- stable. Continue zoloft therapy.  TSH WITH REFLEX TO FT4      9. Dyslipidemia -stable, continue atorvastatin 10 mg daily. Monitor labs.  Comp Metabolic Panel    Lipid Profile    TSH WITH REFLEX TO FT4      10. Age-related osteoporosis without current pathological fracture   Continue routine weight bearing exercise, adequate calcium and vitamin D.   Monitor labs and dexa in future. Evista therapy.  VITAMIN D,25 HYDROXY (DEFICIENCY)      11. Screening for diabetes mellitus  HEMOGLOBIN A1C        Return in about 1 month (around 1/8/2024).    This medical record contains text that has been entered with the assistance of computer voice recognition and dictation software.   Therefore, it may contain unintended errors in text, spelling, punctuation, or grammar.

## 2023-12-20 ENCOUNTER — TELEPHONE (OUTPATIENT)
Dept: MEDICAL GROUP | Facility: IMAGING CENTER | Age: 68
End: 2023-12-20
Payer: MEDICARE

## 2023-12-21 NOTE — TELEPHONE ENCOUNTER
Phone Number Called: [phone]     Call outcome: Spoke to patient regarding message below.    Message: I called the pharmacy and placed a verbal order for her atorvastatin.

## 2024-01-22 SDOH — ECONOMIC STABILITY: INCOME INSECURITY: HOW HARD IS IT FOR YOU TO PAY FOR THE VERY BASICS LIKE FOOD, HOUSING, MEDICAL CARE, AND HEATING?: NOT HARD AT ALL

## 2024-01-22 SDOH — HEALTH STABILITY: MENTAL HEALTH
STRESS IS WHEN SOMEONE FEELS TENSE, NERVOUS, ANXIOUS, OR CAN'T SLEEP AT NIGHT BECAUSE THEIR MIND IS TROUBLED. HOW STRESSED ARE YOU?: NOT AT ALL

## 2024-01-22 SDOH — HEALTH STABILITY: PHYSICAL HEALTH: ON AVERAGE, HOW MANY MINUTES DO YOU ENGAGE IN EXERCISE AT THIS LEVEL?: 30 MIN

## 2024-01-22 SDOH — ECONOMIC STABILITY: HOUSING INSECURITY: IN THE LAST 12 MONTHS, HOW MANY PLACES HAVE YOU LIVED?: 1

## 2024-01-22 SDOH — ECONOMIC STABILITY: FOOD INSECURITY: WITHIN THE PAST 12 MONTHS, YOU WORRIED THAT YOUR FOOD WOULD RUN OUT BEFORE YOU GOT MONEY TO BUY MORE.: NEVER TRUE

## 2024-01-22 SDOH — ECONOMIC STABILITY: TRANSPORTATION INSECURITY
IN THE PAST 12 MONTHS, HAS LACK OF RELIABLE TRANSPORTATION KEPT YOU FROM MEDICAL APPOINTMENTS, MEETINGS, WORK OR FROM GETTING THINGS NEEDED FOR DAILY LIVING?: NO

## 2024-01-22 SDOH — HEALTH STABILITY: PHYSICAL HEALTH: ON AVERAGE, HOW MANY DAYS PER WEEK DO YOU ENGAGE IN MODERATE TO STRENUOUS EXERCISE (LIKE A BRISK WALK)?: 3 DAYS

## 2024-01-22 SDOH — ECONOMIC STABILITY: TRANSPORTATION INSECURITY
IN THE PAST 12 MONTHS, HAS LACK OF TRANSPORTATION KEPT YOU FROM MEETINGS, WORK, OR FROM GETTING THINGS NEEDED FOR DAILY LIVING?: NO

## 2024-01-22 SDOH — ECONOMIC STABILITY: HOUSING INSECURITY
IN THE LAST 12 MONTHS, WAS THERE A TIME WHEN YOU DID NOT HAVE A STEADY PLACE TO SLEEP OR SLEPT IN A SHELTER (INCLUDING NOW)?: NO

## 2024-01-22 SDOH — ECONOMIC STABILITY: FOOD INSECURITY: WITHIN THE PAST 12 MONTHS, THE FOOD YOU BOUGHT JUST DIDN'T LAST AND YOU DIDN'T HAVE MONEY TO GET MORE.: NEVER TRUE

## 2024-01-22 SDOH — ECONOMIC STABILITY: TRANSPORTATION INSECURITY
IN THE PAST 12 MONTHS, HAS THE LACK OF TRANSPORTATION KEPT YOU FROM MEDICAL APPOINTMENTS OR FROM GETTING MEDICATIONS?: NO

## 2024-01-22 SDOH — ECONOMIC STABILITY: INCOME INSECURITY: IN THE LAST 12 MONTHS, WAS THERE A TIME WHEN YOU WERE NOT ABLE TO PAY THE MORTGAGE OR RENT ON TIME?: NO

## 2024-01-22 ASSESSMENT — LIFESTYLE VARIABLES
HOW OFTEN DO YOU HAVE SIX OR MORE DRINKS ON ONE OCCASION: NEVER
SKIP TO QUESTIONS 9-10: 1
HOW MANY STANDARD DRINKS CONTAINING ALCOHOL DO YOU HAVE ON A TYPICAL DAY: PATIENT DOES NOT DRINK
AUDIT-C TOTAL SCORE: 0
HOW OFTEN DO YOU HAVE A DRINK CONTAINING ALCOHOL: NEVER

## 2024-01-22 ASSESSMENT — SOCIAL DETERMINANTS OF HEALTH (SDOH)
WITHIN THE PAST 12 MONTHS, YOU WORRIED THAT YOUR FOOD WOULD RUN OUT BEFORE YOU GOT THE MONEY TO BUY MORE: NEVER TRUE
HOW OFTEN DO YOU GET TOGETHER WITH FRIENDS OR RELATIVES?: ONCE A WEEK
HOW OFTEN DO YOU HAVE A DRINK CONTAINING ALCOHOL: NEVER
HOW HARD IS IT FOR YOU TO PAY FOR THE VERY BASICS LIKE FOOD, HOUSING, MEDICAL CARE, AND HEATING?: NOT HARD AT ALL
HOW MANY DRINKS CONTAINING ALCOHOL DO YOU HAVE ON A TYPICAL DAY WHEN YOU ARE DRINKING: PATIENT DOES NOT DRINK
HOW OFTEN DO YOU GET TOGETHER WITH FRIENDS OR RELATIVES?: ONCE A WEEK
IN A TYPICAL WEEK, HOW MANY TIMES DO YOU TALK ON THE PHONE WITH FAMILY, FRIENDS, OR NEIGHBORS?: MORE THAN THREE TIMES A WEEK
HOW OFTEN DO YOU ATTEND CHURCH OR RELIGIOUS SERVICES?: NEVER
HOW OFTEN DO YOU ATTENT MEETINGS OF THE CLUB OR ORGANIZATION YOU BELONG TO?: NEVER
HOW OFTEN DO YOU HAVE SIX OR MORE DRINKS ON ONE OCCASION: NEVER
HOW OFTEN DO YOU ATTEND CHURCH OR RELIGIOUS SERVICES?: NEVER
DO YOU BELONG TO ANY CLUBS OR ORGANIZATIONS SUCH AS CHURCH GROUPS UNIONS, FRATERNAL OR ATHLETIC GROUPS, OR SCHOOL GROUPS?: NO
DO YOU BELONG TO ANY CLUBS OR ORGANIZATIONS SUCH AS CHURCH GROUPS UNIONS, FRATERNAL OR ATHLETIC GROUPS, OR SCHOOL GROUPS?: NO
IN A TYPICAL WEEK, HOW MANY TIMES DO YOU TALK ON THE PHONE WITH FAMILY, FRIENDS, OR NEIGHBORS?: MORE THAN THREE TIMES A WEEK
HOW OFTEN DO YOU ATTENT MEETINGS OF THE CLUB OR ORGANIZATION YOU BELONG TO?: NEVER

## 2024-01-25 ENCOUNTER — OFFICE VISIT (OUTPATIENT)
Dept: MEDICAL GROUP | Facility: IMAGING CENTER | Age: 69
End: 2024-01-25
Payer: MEDICARE

## 2024-01-25 VITALS
WEIGHT: 131.4 LBS | HEART RATE: 78 BPM | DIASTOLIC BLOOD PRESSURE: 80 MMHG | SYSTOLIC BLOOD PRESSURE: 128 MMHG | HEIGHT: 62 IN | BODY MASS INDEX: 24.18 KG/M2 | TEMPERATURE: 97.6 F | RESPIRATION RATE: 15 BRPM | OXYGEN SATURATION: 98 %

## 2024-01-25 DIAGNOSIS — M81.0 AGE-RELATED OSTEOPOROSIS WITHOUT CURRENT PATHOLOGICAL FRACTURE: ICD-10-CM

## 2024-01-25 DIAGNOSIS — M54.50 LUMBAR PAIN: ICD-10-CM

## 2024-01-25 DIAGNOSIS — K21.9 GASTROESOPHAGEAL REFLUX DISEASE WITHOUT ESOPHAGITIS: ICD-10-CM

## 2024-01-25 DIAGNOSIS — I10 ESSENTIAL HYPERTENSION: ICD-10-CM

## 2024-01-25 DIAGNOSIS — Z78.0 POSTMENOPAUSAL: ICD-10-CM

## 2024-01-25 DIAGNOSIS — G31.84 MILD COGNITIVE IMPAIRMENT: ICD-10-CM

## 2024-01-25 DIAGNOSIS — N39.41 URGE INCONTINENCE OF URINE: ICD-10-CM

## 2024-01-25 DIAGNOSIS — R19.8 GI PROBLEM: ICD-10-CM

## 2024-01-25 DIAGNOSIS — M47.816 SPONDYLOSIS OF LUMBAR REGION WITHOUT MYELOPATHY OR RADICULOPATHY: ICD-10-CM

## 2024-01-25 DIAGNOSIS — F41.9 ANXIETY: ICD-10-CM

## 2024-01-25 DIAGNOSIS — R73.01 ELEVATED FASTING GLUCOSE: ICD-10-CM

## 2024-01-25 DIAGNOSIS — Z87.19 HISTORY OF BARRETT'S ESOPHAGUS: ICD-10-CM

## 2024-01-25 DIAGNOSIS — D72.819 LEUKOPENIA, UNSPECIFIED TYPE: ICD-10-CM

## 2024-01-25 DIAGNOSIS — Z23 NEED FOR HEPATITIS B VACCINATION: ICD-10-CM

## 2024-01-25 DIAGNOSIS — E78.5 DYSLIPIDEMIA: ICD-10-CM

## 2024-01-25 DIAGNOSIS — Z00.00 MEDICARE ANNUAL WELLNESS VISIT, SUBSEQUENT: ICD-10-CM

## 2024-01-25 PROCEDURE — 3079F DIAST BP 80-89 MM HG: CPT | Performed by: FAMILY MEDICINE

## 2024-01-25 PROCEDURE — 1126F AMNT PAIN NOTED NONE PRSNT: CPT | Performed by: FAMILY MEDICINE

## 2024-01-25 PROCEDURE — 3074F SYST BP LT 130 MM HG: CPT | Performed by: FAMILY MEDICINE

## 2024-01-25 PROCEDURE — G0439 PPPS, SUBSEQ VISIT: HCPCS | Performed by: FAMILY MEDICINE

## 2024-01-25 RX ORDER — LIDOCAINE 50 MG/G
1 PATCH TOPICAL
Qty: 10 PATCH | Refills: 3 | Status: SHIPPED | OUTPATIENT
Start: 2024-01-25

## 2024-01-25 RX ORDER — CYCLOBENZAPRINE HCL 5 MG
10 TABLET ORAL 2 TIMES DAILY PRN
Qty: 30 TABLET | Refills: 0 | Status: SHIPPED | OUTPATIENT
Start: 2024-01-25

## 2024-01-25 RX ORDER — OMEPRAZOLE 20 MG/1
20 CAPSULE, DELAYED RELEASE ORAL DAILY
Qty: 1 CAPSULE | Refills: 0
Start: 2024-01-25

## 2024-01-25 ASSESSMENT — FIBROSIS 4 INDEX: FIB4 SCORE: 1.46

## 2024-01-25 ASSESSMENT — PATIENT HEALTH QUESTIONNAIRE - PHQ9: CLINICAL INTERPRETATION OF PHQ2 SCORE: 0

## 2024-01-25 ASSESSMENT — PAIN SCALES - GENERAL: PAINLEVEL: NO PAIN

## 2024-01-25 ASSESSMENT — ENCOUNTER SYMPTOMS: GENERAL WELL-BEING: FAIR

## 2024-01-25 ASSESSMENT — ACTIVITIES OF DAILY LIVING (ADL): BATHING_REQUIRES_ASSISTANCE: 0

## 2024-01-25 NOTE — PROGRESS NOTES
Chief Complaint   Patient presents with    Annual Exam       HPI:  Funmilayo Egan is a 68 y.o. here for Medicare Annual Wellness Visit   Hypertension- amlodipine 2.5 mg, stable at home.   Dyslipidemia- atorvastatin 10 mg, stable.   IFG- past labs.   Wbc low-past labs  GERD- not much symptoms, occasional. 2018- colonoscopy, and upper GI per patient. Omeprazole otc as needed.   Had GI symptoms, may have burping, thus now on strict diet. Generally sore abdomen throughout. Much better. Had soft stool, but possibly had cramp prior to it. Has seen GI for in past for GI issues.   Osteoporosis- evista therapy, due for dexa.   Urge incontinence- stopped her prior medication.  Stable. Pads.   Memory issues-Hx of concussion. Better in past 2 months. Saw neurology, follow up 6 months. Future testing planned.   Anxiety- zoloft therapy, stable.   Was cleaning yesterday, twisted her back some, then had pain. Had used her patches to help. Left side pain. Hx of MRI in past, DJD. No lower extremity numbness/tingling/weakness. No bowel/bladder incontinence that is new.     Patient Active Problem List    Diagnosis Date Noted    Post concussive syndrome 12/01/2023    Mild cognitive impairment 12/01/2023    Ophthalmoplegia 08/14/2023    Age-related nuclear cataract of both eyes 08/14/2023    Glaucoma suspect 08/14/2023    Concussion with no loss of consciousness 06/01/2023    Diplopia 06/01/2023    Traumatic head injury less than 3 months ago 12/29/2022    Intractable neck and back pain due to muscle spasms 12/28/2022    Bilateral foot pain 05/17/2022    Scalp irritation 05/17/2022    Gastroesophageal reflux disease without esophagitis 09/10/2021    Generalized abdominal pain 12/21/2020    Urge incontinence of urine 04/12/2019    Hair pulling 03/12/2019    Essential hypertension 03/12/2019    Age-related osteoporosis without current pathological fracture 03/12/2019    Dyslipidemia 03/12/2019       Current Outpatient Medications    Medication Sig Dispense Refill    VITAMIN D PO Take  by mouth.      VITAMIN E PO Take  by mouth.      omeprazole (PRILOSEC) 20 MG delayed-release capsule Take 1 Capsule by mouth every day. Gets OTC 1 Capsule 0    cyclobenzaprine (FLEXERIL) 5 mg tablet Take 2 Tablets by mouth 2 times a day as needed for Muscle Spasms or Moderate Pain. 30 Tablet 0    sertraline (ZOLOFT) 50 MG Tab TAKE 1 TABLET BY MOUTH EVERY DAY 90 Tablet 3    raloxifene (EVISTA) 60 MG Tab Take 1 Tablet by mouth every day. 90 Tablet 3    polyethylene glycol/lytes (MIRALAX) 17 g Pack Take 17 g by mouth every day.      triamcinolone acetonide (KENALOG) 0.1 % Cream Apply 1 Application topically 2 times a day. 45 g 0    amLODIPine (NORVASC) 2.5 MG Tab Take 1 Tablet by mouth every day. 100 Tablet 2    lidocaine (LIDODERM) 5 % Patch Place 1 Patch on the skin every 24 hours as needed (pain). 30 Patch 2    hydrocortisone rectal (ANUSOL-HC) 2.5% Cream Insert 1 Application into the rectum 2 times a day. 28 g 0    TURMERIC PO Take 2 Tablets by mouth every morning.      Cholecalciferol (VITAMIN D-3) 125 MCG (5000 UT) Tab Take 5,000 Units by mouth every 7 days. On Monday      docusate sodium (COLACE) 100 MG Cap Take 100 mg by mouth 2 times a day.      atorvastatin (LIPITOR) 10 MG Tab Take 1 Tablet by mouth every evening. 100 Tablet 3     No current facility-administered medications for this visit.          Current supplements as per medication list.     Allergies: Sulfa drugs and Penicillins    Current social contact/activities: Friends and family      She  reports that she has quit smoking. Her smoking use included cigarettes. She has never used smokeless tobacco. She reports that she does not currently use alcohol. She reports current drug use. Frequency: 5.00 times per week. Drugs: Marijuana and Oral.  Counseling given: Not Answered  Tobacco comments: 20 y.a      ROS:    Gait: Uses no assistive device  Ostomy: No  Other tubes: No  Amputations: No  Chronic  oxygen use: No  Last eye exam: June 2023   Wears hearing aids: No   : Denies any urinary leakage during the last 6 months      Depression Screening  Little interest or pleasure in doing things?  0 - not at all  Feeling down, depressed , or hopeless? 0 - not at all  Patient Health Questionnaire Score: 0     If depressive symptoms identified deferred to follow up visit unless specifically addressed in assessment and plan.    Interpretation of PHQ-9 Total Score   Score Severity   1-4 No Depression   5-9 Mild Depression   10-14 Moderate Depression   15-19 Moderately Severe Depression   20-27 Severe Depression    Screening for Cognitive Impairment  Do you or any of your friends or family members have any concern about your memory? Yes  Three Minute Recall (Banana, Sunrise, Chair) 1/3    Ricky clock face with all 12 numbers and set the hands to show 20 past 8.  Yes 5  Cognitive concerns identified deferred for follow up unless specifically addressed in assessment and plan.    Fall Risk Assessment  Has the patient had two or more falls in the last year or any fall with injury in the last year?  Yes    Safety Assessment  Do you always wear your seatbelt?  Yes  Any changes to home needed to function safely? No  Difficulty hearing.  No  Patient counseled about all safety risks that were identified.    Functional Assessment ADLs  Are there any barriers preventing you from cooking for yourself or meeting nutritional needs?  No.    Are there any barriers preventing you from driving safely or obtaining transportation?  No.    Are there any barriers preventing you from using a telephone or calling for help?  No    Are there any barriers preventing you from shopping?  No.    Are there any barriers preventing you from taking care of your own finances?  No    Are there any barriers preventing you from managing your medications?  No    Are there any barriers preventing you from showering, bathing or dressing yourself? No    Are there  any barriers preventing you from doing housework or laundry? No  Are there any barriers preventing you from using the toilet?No  Are you currently engaging in any exercise or physical activity?  Yes. At home exercising     Self-Assessment of Health  What is your perception of your health? Fair  Do you sleep more than six hours a night? Yes  In the past 7 days, how much did pain keep you from doing your normal work? Some  Do you spend quality time with family or friends (virtually or in person)? Yes  Do you usually eat a heart healthy diet that constists of a variety of fruits, vegetables, whole grains and fiber? Yes  Do you eat foods high in fat and/or Fast Food more than three times per week? Yes    Advance Care Planning  Do you have an Advance Directive, Living Will, Durable Power of , or POLST? No                 Health Maintenance Summary            Overdue - Hepatitis B Vaccine (Hep B) (2 of 3 - Hep B Twinrix 3-dose series) Overdue since 1/5/2007 12/08/2006  Imm Admin: Hep A/HEP B Combined Vaccine (TwinRix)              Overdue - Annual Wellness Visit (Yearly) Overdue since 12/16/2023 12/16/2022  Visit Dx: Medicare annual wellness visit, subsequent              Mammogram (Every 2 Years) Next due on 5/23/2024 05/23/2022  MA-SCREENING MAMMO BILAT W/TOMOSYNTHESIS W/CAD    04/01/2021  MA-SCREENING MAMMO BILAT W/TOMOSYNTHESIS W/CAD    04/15/2019  MA-SCREENING MAMMO BILAT W/TOMOSYNTHESIS W/CAD    10/17/2017  MA-SCREENING MAMMO BILAT W/CAD    12/04/2009  MA-SCREENING DIGITAL MAMMO    Only the first 5 history entries have been loaded, but more history exists.              Bone Density Scan (Every 5 Years) Next due on 4/1/2026 04/01/2021  DS-BONE DENSITY STUDY (DEXA)    12/04/2009  DS-BONE DENSITY STUDY (DEXA)    03/02/2007  DS-BONE DENSITY STUDY (DEXA)              IMM DTaP/Tdap/Td Vaccine (2 - Td or Tdap) Next due on 8/21/2026 08/21/2016  Imm Admin: Tdap Vaccine    12/08/2006  Imm  Admin: TD Vaccine              Colorectal Cancer Screening (Colonoscopy - Preferred) Next due on 6/26/2028 06/26/2018  REFERRAL TO GI FOR COLONOSCOPY              Hepatitis A Vaccine (Hep A) (Series Information) Aged Out      12/08/2006  Imm Admin: Hep A/HEP B Combined Vaccine (TwinRix)              Zoster (Shingles) Vaccines (Series Information) Completed      04/10/2019  Imm Admin: Zoster Vaccine Recombinant (RZV) (SHINGRIX)    07/23/2018  Imm Admin: Zoster Vaccine Recombinant (RZV) (SHINGRIX)    07/22/2017  Imm Admin: Zoster Vaccine Live (ZVL) (Zostavax) - HISTORICAL DATA              Hepatitis C Screening  Tentatively Complete      07/09/2020  Hepatitis C Antibody component of HEP C VIRUS ANTIBODY    05/10/2019  Hepatitis C Antibody component of HEP C VIRUS ANTIBODY              Pneumococcal Vaccine: 65+ Years (Series Information) Completed      12/16/2022  Imm Admin: Pneumococcal Conjugate Vaccine (PCV20)    07/23/2018  Imm Admin: Pneumococcal polysaccharide vaccine (PPSV-23)              Influenza Vaccine (Series Information) Completed      09/19/2023  Imm Admin: Influenza Vaccine, Quadrivalent, Adjuvanted (Pf)    11/03/2022  Imm Admin: Influenza, Unspecified - HISTORICAL DATA    10/15/2021  Imm Admin: Influenza, Unspecified - HISTORICAL DATA    09/15/2020  Imm Admin: Influenza Vaccine Adult HD    10/16/2019  Imm Admin: Influenza Vac Subunit Quad Inj (Pf)    Only the first 5 history entries have been loaded, but more history exists.              COVID-19 Vaccine (Series Information) Completed      09/19/2023  Imm Admin: Comirnaty (Covid-19 Vaccine, Mrna, [id number] Formula)    11/03/2022  Imm Admin: MODERNA BIVALENT BOOSTER SARS-COV-2 VACCINE (6+)    05/10/2022  Imm Admin: MODERNA SARS-COV-2 VACCINE (12+)    11/20/2021  Imm Admin: MODERNA SARS-COV-2 VACCINE (12+)    04/01/2021  Imm Admin: MODERNA SARS-COV-2 VACCINE (12+)    Only the first 5 history entries have been loaded, but more history exists.               HPV Vaccines (Series Information) Aged Out      No completion history exists for this topic.              Polio Vaccine (Inactivated Polio) (Series Information) Aged Out      No completion history exists for this topic.              Meningococcal Immunization (Series Information) Aged Out      No completion history exists for this topic.              Discontinued - Cervical Cancer Screening  Discontinued        Frequency changed to Never automatically (Topic No Longer Applies)    04/26/2019  THINPREP PAP WITH HPV    04/26/2019  Pathology Gynecology Specimen                    Patient Care Team:  Samina Mera M.D. as PCP - General (Family Medicine)  Nate Luther Ass't as [facility]         Social History     Tobacco Use    Smoking status: Former     Types: Cigarettes    Smokeless tobacco: Never    Tobacco comments:     20 y.a   Vaping Use    Vaping Use: Never used   Substance Use Topics    Alcohol use: Not Currently     Comment: stopped in 2020    Drug use: Yes     Frequency: 5.0 times per week     Types: Marijuana, Oral     Comment: In evening before bed to sleep     Family History   Problem Relation Age of Onset    Heart Disease Mother 65    Cancer Father     Heart Disease Brother     Dementia Sister     Stroke Sister     Heart Disease Brother     Hypertension Sister     Hypertension Sister     Hyperlipidemia Sister     Stroke Sister     Glaucoma Maternal Uncle      She  has a past medical history of Allergy, Arthritis, ASTHMA, Bowel habit changes, Chest discomfort (04/2019), COPD, GERD (gastroesophageal reflux disease), Hyperlipidemia, Hypertension, IBD (inflammatory bowel disease), OSTEOPOROSIS, and Shortness of breath (05/2019).   Past Surgical History:   Procedure Laterality Date    HEMORRHOIDECTOMY N/A 4/19/2021    Procedure: HEMORRHOIDECTOMY - INTERNAL AND EXTERNAL, COMPLEX OR EXTENSIVE.;  Surgeon: Estela Kenney M.D.;  Location: SURGERY [facility];  Service:  "General    OTHER  2020    Esophagus    ABDOMINAL HYSTERECTOMY TOTAL      APPENDECTOMY      HEMORRHOIDECTOMY         Exam:   /80 (BP Location: Left arm, Patient Position: Sitting, BP Cuff Size: Adult)   Pulse 78   Temp 36.4 °C (97.6 °F) (Temporal)   Resp 15   Ht 1.575 m (5' 2\")   Wt 59.6 kg (131 lb 6.4 oz)   SpO2 98%  Body mass index is 24.03 kg/m².    Hearing good.    Dentition good  Alert, oriented in no acute distress.  Eye contact is good, speech goal directed, affect calm  Constitutional: She appears well-developed and well-nourished. She appears not diaphoretic. No distress.   HENT: Right Ear: External ear normal. Left Ear: External ear normal. Tympanic membranes clear and intact.   Nose: Nose normal.   Mouth/Throat: Oropharynx is clear and moist. No oropharyngeal exudate.     Eyes: Conjunctivae and extraocular motions are normal. Pupils are equal, round, and reactive to light. No scleral icterus.   Neck: Normal range of motion. Neck supple. No thyromegaly present.   Cardiovascular: Normal rate, regular rhythm, normal heart sounds and intact distal pulses.  Exam reveals no gallop and no friction rub.  No murmur heard. No carotid bruits.   Pulmonary/Chest: Effort normal and breath sounds normal. No respiratory distress. She has no wheezes. She has no rales.   Abdominal: Soft. Bowel sounds are normal. She exhibits no distension and no mass. No tenderness. She has no rebound and no guarding.   Lymphadenopathy:  She has no cervical adenopathy.   Neurological: She is alert. She has normal reflexes. No cranial nerve deficit. She exhibits normal muscle tone.   Skin: Skin is warm and dry. No rash noted. She is not diaphoretic. No erythema.   Psychiatric: She has a normal mood and affect. Her behavior is normal.   Musculoskeletal: She exhibits no edema. Full strength throughout. 2+ DTR throughout. Back: no spinal TTP, left L4-5 region with paraspinal TTP.       Assessment and Plan. The following treatment " and monitoring plan is recommended:        67 yo female    1. Medicare annual wellness visit, subsequent   Reviewed healthcare maintenance and immunization recommendations.  Recommend healthy diet and routine exercise.       2. Essential hypertension -stable.  She will continue on amlodipine 2.5 mg daily       3. Dyslipidemia -stable.  She will continue on atorvastatin 10 mg daily.  Recommend low-cholesterol, high-fiber diet and routine exercise. Comp Metabolic Panel    Lipid Profile    TSH WITH REFLEX TO FT4    Subsequent Annual Wellness Visit - Includes PPPS ()      4. Elevated fasting glucose -stable.  Monitor labs. HEMOGLOBIN A1C    Subsequent Annual Wellness Visit - Includes PPPS ()              5. Leukopenia, unspecified type -stable.  Monitor labs. CBC with differential      6. Gastroesophageal reflux disease without esophagitis -has been stable.  Has had prior GI evaluation.  Uses omeprazole over-the-counter intermittently as needed.  Continue diet modifications.  Referral to GI for follow-up. CBC WITH DIFFERENTIAL    Comp Metabolic Panel    Referral to Gastroenterology    Subsequent Annual Wellness Visit - Includes PPPS ()      7.  History of Sue's esophagus   Referral to GI for follow-up. CBC WITH DIFFERENTIAL    Referral to Gastroenterology    Subsequent Annual Wellness Visit - Includes PPPS ()      8. GI problem -notes some chronic general abdominal soreness.  Notes prior burping has improved with dietary modifications.  Has had prior GI evaluation.  Patient to follow-up with GI. CBC WITH DIFFERENTIAL    Referral to Gastroenterology    Subsequent Annual Wellness Visit - Includes PPPS ()      9. Age-related osteoporosis without current pathological fracture   Continue Evista therapy.  Due for bone density scan.  Recommend routine weightbearing exercise, adequate calcium vitamin D intake. VITAMIN D,25 HYDROXY (DEFICIENCY)    DS-BONE DENSITY STUDY (DEXA)    Subsequent Annual  Wellness Visit - Includes PPPS ()      10. Postmenopausal  Subsequent Annual Wellness Visit - Includes PPPS ()      12. Urge incontinence of urine -stable.  Monitor.  Recommend urinary hygiene and routine Kegel exercises. URINALYSIS,CULTURE IF INDICATED    Subsequent Annual Wellness Visit - Includes PPPS ()      13. Mild cognitive impairment -has seen neurology.  Patient scheduled for follow-up in future and further testing. Subsequent Annual Wellness Visit - Includes PPPS ()      14. Anxiety -stable.  She will continue on sertraline 50 mg daily. Subsequent Annual Wellness Visit - Includes PPPS ()      15. Spondylosis of lumbar region without myelopathy or radiculopathy -as below. Referral to Physical Therapy    cyclobenzaprine (FLEXERIL) 5 mg tablet    lidocaine (LIDODERM) 5 % Patch      16. Lumbar pain -recent acute symptoms on left.  Reviewed potential side effects medication, use with caution.  Modify activities.  Referral to physical therapy. Referral to Physical Therapy    cyclobenzaprine (FLEXERIL) 5 mg tablet    lidocaine (LIDODERM) 5 % Patch    Subsequent Annual Wellness Visit - Includes PPPS ()      17. Need for hepatitis B vaccination  Hep B Adult 20+          Services suggested: No services needed at this time  Health Care Screening: Age-appropriate preventive services recommended by USPTF and ACIP covered by Medicare were discussed today. Services ordered if indicated and agreed upon by the patient.  Referrals offered: Community-based lifestyle interventions to reduce health risks and promote self-management and wellness, fall prevention, nutrition, physical activity, tobacco-use cessation, weight loss, and mental health services as per orders if indicated.    Discussion today about general wellness and lifestyle habits:    Prevent falls and reduce trip hazards; Cautioned about securing or removing rugs.  Have a working fire alarm and carbon monoxide detector;   Engage in  regular physical activity and social activities     Follow-up: Return in about 3 months (around 4/25/2024).  Recommend routine AWV.     This medical record contains text that has been entered with the assistance of computer voice recognition and dictation software.  Therefore, it may contain unintended errors in text, spelling, punctuation, or grammar.

## 2024-02-01 ENCOUNTER — TELEPHONE (OUTPATIENT)
Dept: MEDICAL GROUP | Facility: IMAGING CENTER | Age: 69
End: 2024-02-01
Payer: MEDICARE

## 2024-02-01 NOTE — TELEPHONE ENCOUNTER
Caller Name:Funmilayo Egan   Call Back Number: [phone] (home)      How would the patient prefer to be contacted with a response: Phone call do NOT leave a detailed message    Pt called stating lidocaine needed approval to the pharmacy. I called the pharmacy and they need a prior authorization.     Please start PA asap and notify pt.    Thanks

## 2024-03-06 ENCOUNTER — DOCUMENTATION (OUTPATIENT)
Dept: HEALTH INFORMATION MANAGEMENT | Facility: OTHER | Age: 69
End: 2024-03-06
Payer: MEDICARE

## 2024-03-12 ENCOUNTER — OFFICE VISIT (OUTPATIENT)
Dept: MEDICAL GROUP | Facility: IMAGING CENTER | Age: 69
End: 2024-03-12
Payer: MEDICARE

## 2024-03-12 VITALS
BODY MASS INDEX: 23.74 KG/M2 | OXYGEN SATURATION: 98 % | DIASTOLIC BLOOD PRESSURE: 78 MMHG | TEMPERATURE: 97.6 F | HEIGHT: 62 IN | SYSTOLIC BLOOD PRESSURE: 130 MMHG | RESPIRATION RATE: 15 BRPM | HEART RATE: 90 BPM | WEIGHT: 129 LBS

## 2024-03-12 DIAGNOSIS — M70.62 TROCHANTERIC BURSITIS, LEFT HIP: ICD-10-CM

## 2024-03-12 DIAGNOSIS — M25.552 HIP PAIN, LEFT: ICD-10-CM

## 2024-03-12 PROCEDURE — 1126F AMNT PAIN NOTED NONE PRSNT: CPT | Performed by: FAMILY MEDICINE

## 2024-03-12 PROCEDURE — 3075F SYST BP GE 130 - 139MM HG: CPT | Performed by: FAMILY MEDICINE

## 2024-03-12 PROCEDURE — 99214 OFFICE O/P EST MOD 30 MIN: CPT | Performed by: FAMILY MEDICINE

## 2024-03-12 PROCEDURE — 3078F DIAST BP <80 MM HG: CPT | Performed by: FAMILY MEDICINE

## 2024-03-12 ASSESSMENT — PAIN SCALES - GENERAL: PAINLEVEL: NO PAIN

## 2024-03-12 ASSESSMENT — FIBROSIS 4 INDEX: FIB4 SCORE: 1.46

## 2024-03-12 NOTE — PROGRESS NOTES
SUBJECTIVE:    Chief Complaint   Patient presents with    Hip Pain     Pt states she is having left sided hip pain that started on Sunday and she can barely walk, it makes it feel worse. She is taking the muscle relaxant to help her sleep because she can't lay on it. She previously had a fall on her left side back in September and is not sure if related to that.        HPI:     Funmilayo Egan is a 68 y.o. female here for left hip pain.   Pain of left lateral hip region, started about 2-3 days ago.   Notes last September she fell and hurt her left arm area.  Potentially affected her left hip area.  States she was using a topical therapy which improved.  Occasionally notes some pain when sleeping.  Woke up about 3 days ago and could not walk due to her left hip pain.  Use hot and cold therapy and could walk some yesterday.  No recent injuries.  No numbness, tingling or weakness.  No bowel incontinence.  History of urinary urge incontinence, no new symptoms.  Used a muscle relaxer which helped a little previously.  Has been using Lidoderm patch which has helped some.  She is currently in physical therapy for her right neck.    ROS:  No recent fevers or chills. No nausea or vomiting. No diarrhea. No chest pains or shortness of breath. No lower extremity edema.    Current Outpatient Medications on File Prior to Visit   Medication Sig Dispense Refill    VITAMIN D PO Take  by mouth.      VITAMIN E PO Take  by mouth.      omeprazole (PRILOSEC) 20 MG delayed-release capsule Take 1 Capsule by mouth every day. Gets OTC 1 Capsule 0    cyclobenzaprine (FLEXERIL) 5 mg tablet Take 2 Tablets by mouth 2 times a day as needed for Muscle Spasms or Moderate Pain. 30 Tablet 0    lidocaine (LIDODERM) 5 % Patch Place 1 Patch on the skin every 24 hours as needed (back pain). 10 Patch 3    sertraline (ZOLOFT) 50 MG Tab TAKE 1 TABLET BY MOUTH EVERY DAY 90 Tablet 3    raloxifene (EVISTA) 60 MG Tab Take 1 Tablet by mouth every day. 90 Tablet  3    polyethylene glycol/lytes (MIRALAX) 17 g Pack Take 17 g by mouth every day.      triamcinolone acetonide (KENALOG) 0.1 % Cream Apply 1 Application topically 2 times a day. 45 g 0    amLODIPine (NORVASC) 2.5 MG Tab Take 1 Tablet by mouth every day. 100 Tablet 2    hydrocortisone rectal (ANUSOL-HC) 2.5% Cream Insert 1 Application into the rectum 2 times a day. 28 g 0    TURMERIC PO Take 2 Tablets by mouth every morning.      Cholecalciferol (VITAMIN D-3) 125 MCG (5000 UT) Tab Take 5,000 Units by mouth every 7 days. On Monday      docusate sodium (COLACE) 100 MG Cap Take 100 mg by mouth 2 times a day.      atorvastatin (LIPITOR) 10 MG Tab Take 1 Tablet by mouth every evening. 100 Tablet 3     No current facility-administered medications on file prior to visit.       Allergies   Allergen Reactions    Sulfa Drugs Rash and Unspecified     7/15/19 welting urticaria/rash after taking Bactrim     Penicillins Itching and Unspecified     Blisters       Patient Active Problem List    Diagnosis Date Noted    Post concussive syndrome 12/01/2023    Mild cognitive impairment 12/01/2023    Ophthalmoplegia 08/14/2023    Age-related nuclear cataract of both eyes 08/14/2023    Glaucoma suspect 08/14/2023    Concussion with no loss of consciousness 06/01/2023    Diplopia 06/01/2023    Traumatic head injury less than 3 months ago 12/29/2022    Intractable neck and back pain due to muscle spasms 12/28/2022    Bilateral foot pain 05/17/2022    Scalp irritation 05/17/2022    Gastroesophageal reflux disease without esophagitis 09/10/2021    Generalized abdominal pain 12/21/2020    Urge incontinence of urine 04/12/2019    Hair pulling 03/12/2019    Essential hypertension 03/12/2019    Age-related osteoporosis without current pathological fracture 03/12/2019    Dyslipidemia 03/12/2019       Past Medical History:   Diagnosis Date    Allergy     Arthritis     ASTHMA     remote hx    Bowel habit changes     Chest discomfort 04/2019     "Coronary CT scan with LAD 11.4.     COPD     mild, not on meds for it    GERD (gastroesophageal reflux disease)     Hyperlipidemia     Hypertension     IBD (inflammatory bowel disease)     OSTEOPOROSIS     Shortness of breath 05/2019    Echocardiogram with normal LV size, LVEF 65%.Trace MR. Mild TR. RVSP 30mmHg.         OBJECTIVE:   /78 (BP Location: Left arm, Patient Position: Sitting, BP Cuff Size: Adult)   Pulse 90   Temp 36.4 °C (97.6 °F) (Temporal)   Resp 15   Ht 1.575 m (5' 2\")   Wt 58.5 kg (129 lb)   SpO2 98%   BMI 23.59 kg/m²   General: Well-developed well-nourished female, no acute distress  Extremities: no cyanosis, clubbing, edema.  Lower extremity with full strength and normal reflexes.  Left lateral hip region with tenderness to palpation along IT band.  Left hip with adequate range of motion, no pain with internal rotation, slight positive impingement.   Skin: Warm and dry  Psych: appropriate mood and affect        ASSESSMENT/PLAN:    68 y.o.female       1. Hip pain, left  DX-HIP-UNILATERAL-WITH PELVIS-1 VIEW LEFT    diclofenac sodium (VOLTAREN) 1 % Gel      2. Trochanteric bursitis, left hip  diclofenac sodium (VOLTAREN) 1 % Gel      Left lateral hip pain symptoms may be multifactorial.  Some component of trochanteric bursitis and IT band involvement noted.  Recommend modify activities.  Will assess hip x-ray, history of prior injury in the past.  Voltaren gel topical therapy as needed.  Patient will look into working with her current physical therapist for this issue and notify us if she needs a new referral.    Return in about 6 weeks (around 4/23/2024).    This medical record contains text that has been entered with the assistance of computer voice recognition and dictation software.  Therefore, it may contain unintended errors in text, spelling, punctuation, or grammar.                            "

## 2024-03-13 PROBLEM — I70.0 ATHEROSCLEROSIS OF AORTA (HCC): Status: ACTIVE | Noted: 2024-03-13

## 2024-03-13 PROBLEM — S09.90XA TRAUMATIC HEAD INJURY LESS THAN 3 MONTHS AGO: Status: RESOLVED | Noted: 2022-12-29 | Resolved: 2024-03-13

## 2024-03-14 ENCOUNTER — HOSPITAL ENCOUNTER (OUTPATIENT)
Dept: LAB | Facility: MEDICAL CENTER | Age: 69
End: 2024-03-14
Attending: FAMILY MEDICINE
Payer: MEDICARE

## 2024-03-14 DIAGNOSIS — E78.5 DYSLIPIDEMIA: ICD-10-CM

## 2024-03-14 DIAGNOSIS — D72.819 LEUKOPENIA, UNSPECIFIED TYPE: ICD-10-CM

## 2024-03-14 DIAGNOSIS — R19.8 GI PROBLEM: ICD-10-CM

## 2024-03-14 DIAGNOSIS — K21.9 GASTROESOPHAGEAL REFLUX DISEASE WITHOUT ESOPHAGITIS: ICD-10-CM

## 2024-03-14 DIAGNOSIS — R73.01 ELEVATED FASTING GLUCOSE: ICD-10-CM

## 2024-03-14 DIAGNOSIS — M81.0 AGE-RELATED OSTEOPOROSIS WITHOUT CURRENT PATHOLOGICAL FRACTURE: ICD-10-CM

## 2024-03-14 DIAGNOSIS — Z87.19 HISTORY OF BARRETT'S ESOPHAGUS: ICD-10-CM

## 2024-03-14 LAB
25(OH)D3 SERPL-MCNC: 37 NG/ML (ref 30–100)
ALBUMIN SERPL BCP-MCNC: 4.7 G/DL (ref 3.2–4.9)
ALBUMIN/GLOB SERPL: 1.9 G/DL
ALP SERPL-CCNC: 61 U/L (ref 30–99)
ALT SERPL-CCNC: 16 U/L (ref 2–50)
ANION GAP SERPL CALC-SCNC: 9 MMOL/L (ref 7–16)
AST SERPL-CCNC: 19 U/L (ref 12–45)
BASOPHILS # BLD AUTO: 0.2 % (ref 0–1.8)
BASOPHILS # BLD: 0.01 K/UL (ref 0–0.12)
BILIRUB SERPL-MCNC: 0.4 MG/DL (ref 0.1–1.5)
BUN SERPL-MCNC: 17 MG/DL (ref 8–22)
CALCIUM ALBUM COR SERPL-MCNC: 8.8 MG/DL (ref 8.5–10.5)
CALCIUM SERPL-MCNC: 9.4 MG/DL (ref 8.4–10.2)
CHLORIDE SERPL-SCNC: 105 MMOL/L (ref 96–112)
CHOLEST SERPL-MCNC: 167 MG/DL (ref 100–199)
CO2 SERPL-SCNC: 29 MMOL/L (ref 20–33)
CREAT SERPL-MCNC: 0.62 MG/DL (ref 0.5–1.4)
EOSINOPHIL # BLD AUTO: 0.4 K/UL (ref 0–0.51)
EOSINOPHIL NFR BLD: 7.7 % (ref 0–6.9)
ERYTHROCYTE [DISTWIDTH] IN BLOOD BY AUTOMATED COUNT: 44.9 FL (ref 35.9–50)
EST. AVERAGE GLUCOSE BLD GHB EST-MCNC: 100 MG/DL
FASTING STATUS PATIENT QL REPORTED: NORMAL
GFR SERPLBLD CREATININE-BSD FMLA CKD-EPI: 97 ML/MIN/1.73 M 2
GLOBULIN SER CALC-MCNC: 2.5 G/DL (ref 1.9–3.5)
GLUCOSE SERPL-MCNC: 100 MG/DL (ref 65–99)
HBA1C MFR BLD: 5.1 % (ref 4–5.6)
HCT VFR BLD AUTO: 44.7 % (ref 37–47)
HDLC SERPL-MCNC: 57 MG/DL
HGB BLD-MCNC: 15.1 G/DL (ref 12–16)
IMM GRANULOCYTES # BLD AUTO: 0.01 K/UL (ref 0–0.11)
IMM GRANULOCYTES NFR BLD AUTO: 0.2 % (ref 0–0.9)
LDLC SERPL CALC-MCNC: 94 MG/DL
LYMPHOCYTES # BLD AUTO: 1.38 K/UL (ref 1–4.8)
LYMPHOCYTES NFR BLD: 26.6 % (ref 22–41)
MCH RBC QN AUTO: 31.5 PG (ref 27–33)
MCHC RBC AUTO-ENTMCNC: 33.8 G/DL (ref 32.2–35.5)
MCV RBC AUTO: 93.1 FL (ref 81.4–97.8)
MONOCYTES # BLD AUTO: 0.39 K/UL (ref 0–0.85)
MONOCYTES NFR BLD AUTO: 7.5 % (ref 0–13.4)
NEUTROPHILS # BLD AUTO: 3 K/UL (ref 1.82–7.42)
NEUTROPHILS NFR BLD: 57.8 % (ref 44–72)
NRBC # BLD AUTO: 0 K/UL
NRBC BLD-RTO: 0 /100 WBC (ref 0–0.2)
PLATELET # BLD AUTO: 217 K/UL (ref 164–446)
PMV BLD AUTO: 10.3 FL (ref 9–12.9)
POTASSIUM SERPL-SCNC: 4.5 MMOL/L (ref 3.6–5.5)
PROT SERPL-MCNC: 7.2 G/DL (ref 6–8.2)
RBC # BLD AUTO: 4.8 M/UL (ref 4.2–5.4)
SODIUM SERPL-SCNC: 143 MMOL/L (ref 135–145)
TRIGL SERPL-MCNC: 81 MG/DL (ref 0–149)
TSH SERPL DL<=0.005 MIU/L-ACNC: 1.88 UIU/ML (ref 0.38–5.33)
WBC # BLD AUTO: 5.2 K/UL (ref 4.8–10.8)

## 2024-03-14 PROCEDURE — 82306 VITAMIN D 25 HYDROXY: CPT

## 2024-03-14 PROCEDURE — 36415 COLL VENOUS BLD VENIPUNCTURE: CPT

## 2024-03-14 PROCEDURE — 80053 COMPREHEN METABOLIC PANEL: CPT

## 2024-03-14 PROCEDURE — 84443 ASSAY THYROID STIM HORMONE: CPT

## 2024-03-14 PROCEDURE — 85025 COMPLETE CBC W/AUTO DIFF WBC: CPT

## 2024-03-14 PROCEDURE — 83036 HEMOGLOBIN GLYCOSYLATED A1C: CPT

## 2024-03-14 PROCEDURE — 80061 LIPID PANEL: CPT

## 2024-03-19 ENCOUNTER — APPOINTMENT (OUTPATIENT)
Dept: RADIOLOGY | Facility: MEDICAL CENTER | Age: 69
End: 2024-03-19
Attending: FAMILY MEDICINE
Payer: MEDICARE

## 2024-03-19 DIAGNOSIS — M25.552 HIP PAIN, LEFT: ICD-10-CM

## 2024-03-19 PROCEDURE — 73501 X-RAY EXAM HIP UNI 1 VIEW: CPT | Mod: LT

## 2024-04-11 ENCOUNTER — OFFICE VISIT (OUTPATIENT)
Dept: NEUROLOGY | Facility: MEDICAL CENTER | Age: 69
End: 2024-04-11
Attending: PSYCHIATRY & NEUROLOGY
Payer: MEDICARE

## 2024-04-11 VITALS
DIASTOLIC BLOOD PRESSURE: 72 MMHG | BODY MASS INDEX: 23.98 KG/M2 | SYSTOLIC BLOOD PRESSURE: 142 MMHG | HEART RATE: 82 BPM | TEMPERATURE: 97.2 F | OXYGEN SATURATION: 96 % | WEIGHT: 130.29 LBS | HEIGHT: 62 IN

## 2024-04-11 DIAGNOSIS — F07.81 POST CONCUSSIVE SYNDROME: ICD-10-CM

## 2024-04-11 DIAGNOSIS — N39.41 URGE INCONTINENCE OF URINE: ICD-10-CM

## 2024-04-11 DIAGNOSIS — I10 ESSENTIAL HYPERTENSION: ICD-10-CM

## 2024-04-11 DIAGNOSIS — G31.84 MILD COGNITIVE IMPAIRMENT: Primary | ICD-10-CM

## 2024-04-11 DIAGNOSIS — H53.2 DIPLOPIA: ICD-10-CM

## 2024-04-11 PROBLEM — D12.2 BENIGN NEOPLASM OF ASCENDING COLON: Status: ACTIVE | Noted: 2018-06-26

## 2024-04-11 PROBLEM — K22.70 BARRETT'S ESOPHAGUS WITHOUT DYSPLASIA: Status: ACTIVE | Noted: 2020-08-25

## 2024-04-11 PROBLEM — D12.0 BENIGN NEOPLASM OF CECUM: Status: ACTIVE | Noted: 2018-06-26

## 2024-04-11 PROBLEM — J44.9 CHRONIC OBSTRUCTIVE PULMONARY DISEASE, UNSPECIFIED (HCC): Status: ACTIVE | Noted: 2024-04-11

## 2024-04-11 PROBLEM — M81.0 OSTEOPOROSIS: Status: ACTIVE | Noted: 2024-04-11

## 2024-04-11 PROBLEM — K21.9 GASTRO-ESOPHAGEAL REFLUX DISEASE WITHOUT ESOPHAGITIS: Status: ACTIVE | Noted: 2024-04-11

## 2024-04-11 PROCEDURE — 3077F SYST BP >= 140 MM HG: CPT | Performed by: PSYCHIATRY & NEUROLOGY

## 2024-04-11 PROCEDURE — 3078F DIAST BP <80 MM HG: CPT | Performed by: PSYCHIATRY & NEUROLOGY

## 2024-04-11 PROCEDURE — 99214 OFFICE O/P EST MOD 30 MIN: CPT | Performed by: PSYCHIATRY & NEUROLOGY

## 2024-04-11 PROCEDURE — 99212 OFFICE O/P EST SF 10 MIN: CPT | Performed by: PSYCHIATRY & NEUROLOGY

## 2024-04-11 RX ORDER — AMLODIPINE BESYLATE 2.5 MG/1
2.5 TABLET ORAL DAILY
COMMUNITY

## 2024-04-11 RX ORDER — FAMOTIDINE 20 MG/1
20 TABLET, FILM COATED ORAL NIGHTLY
COMMUNITY
Start: 2024-04-10

## 2024-04-11 RX ORDER — ATORVASTATIN CALCIUM 10 MG/1
10 TABLET, FILM COATED ORAL DAILY
COMMUNITY

## 2024-04-11 RX ORDER — RALOXIFENE HYDROCHLORIDE 60 MG/1
60 TABLET, FILM COATED ORAL DAILY
COMMUNITY

## 2024-04-11 ASSESSMENT — FIBROSIS 4 INDEX: FIB4 SCORE: 1.49

## 2024-04-11 NOTE — PROGRESS NOTES
"Neuro follow up visit:  Post Traumatic Headache due to accidental mechanical fall in Dec 2022 at her home with post traumatic diplopia, mild cognitive impairment and prior diplopic related headaches (resolving very soon after having special lense used to reduce diplopic effect).     Saw me last in June 2023:     Funmilayo Egan 68 y.o. right handed woman who is from [place] but who has been in the [place] for over 40  years.  Lives with .  Previously worked in a [employer] x 17 years (including being a Dealer and ).  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     Funmilayo previously described 1 week before Christmas 2022 and went outside her house and slipped on black ice and was going to open car door. It was about 6 pm in the evening and by her own view she had both legs went out in front of her  (on the ice) and her head hit back of head (no clear LOC or prodrome).  to her to [facility])- 3 days and 4 months.      While she was in the hospital she was having mild diplopia and diffuse headaches (bilateral).  Headaches still bothersome about 3 days a week for hours out a time.   No ongoing nausea,emesis,photophobia,speech-language disturbances ongoing or evolving.     Of note she stopped her Toltterodim (for prior bladder issues) about 4-6 weeks ago and within 2 weeks of stopping this medication she is able to concentrate better and \"if I want to write stuff and do stuff the information comes easier).     Saw Dr. Mike Hugo ([facility]) 2 times after seeing me in June 2023 due to persisting   Double vision  since the head bonk as above without any clear decline. If she looks at me she recognizes there are 2 images (the \"fake one is to the right\" of the real image.She has double vision to multiple directions of gaze which is gone after she closes one eye.     Funmilayo has no evolving gait-balance difficulties in the last 3 months.    She has not had any " "dysarthria,dysphagia,vertiginous events, tinnitus in the recent months.     There has been  ongoing subjective weakness of the limbs or sensory disturbances of the limbs x 4 in the last 3 months or ongoing.     No prior history of concussion(s) or seizure type events (episodes) described to me today in review.     There are no  paranoia,delusions,visual or auditory hallucinations in the recent months.     Denies feeling or being anxious,depressed or suicidal in the last 3-6 months.     There have been no REM Behavioral symptoms and and averages 7-8 hours of sleep most nights in the recent 3-6 months or so.     Before the 12/2022 concussive event, she was being told by people at work \"I just told you a few minutes ago that\" but this issue was very mild.  has told her on multiple occasions that \"I just told that\" within 2 hours or so.      Infrequent smoking - quit in 1993.  No significant alcohol use in adult life.        Family Hx: Sister (dementia- onset in mid 60's and had sixto dementia issue by 60's)> now age 73.  Parents: no significant cognitive/memory difficulties.        Patient Active Problem List    Diagnosis Date Noted    Atherosclerosis of aorta (HCC) 03/13/2024    Post concussive syndrome 12/01/2023    Mild cognitive impairment 12/01/2023    Ophthalmoplegia 08/14/2023    Age-related nuclear cataract of both eyes 08/14/2023    Glaucoma suspect 08/14/2023    Concussion with no loss of consciousness 06/01/2023    Diplopia 06/01/2023    Intractable neck and back pain due to muscle spasms 12/28/2022    Bilateral foot pain 05/17/2022    Scalp irritation 05/17/2022    Gastroesophageal reflux disease without esophagitis 09/10/2021    Generalized abdominal pain 12/21/2020    Urge incontinence of urine 04/12/2019    Hair pulling 03/12/2019    Essential hypertension 03/12/2019    Age-related osteoporosis without current pathological fracture 03/12/2019    Dyslipidemia 03/12/2019       Past medical history: "   Past Medical History:   Diagnosis Date    Allergy     Arthritis     ASTHMA     remote hx    Bowel habit changes     Chest discomfort 04/2019    Coronary CT scan with LAD 11.4.     COPD     mild, not on meds for it    GERD (gastroesophageal reflux disease)     Hyperlipidemia     Hypertension     IBD (inflammatory bowel disease)     OSTEOPOROSIS     Shortness of breath 05/2019    Echocardiogram with normal LV size, LVEF 65%.Trace MR. Mild TR. RVSP 30mmHg.    Traumatic head injury less than 3 months ago        Past surgical history:   Past Surgical History:   Procedure Laterality Date    HEMORRHOIDECTOMY N/A 4/19/2021    Procedure: HEMORRHOIDECTOMY - INTERNAL AND EXTERNAL, COMPLEX OR EXTENSIVE.;  Surgeon: Estela Kenney M.D.;  Location: SURGERY [facility];  Service: General    OTHER  2020    Esophagus    ABDOMINAL HYSTERECTOMY TOTAL      APPENDECTOMY      HEMORRHOIDECTOMY           Social history:   Social History     Socioeconomic History    Marital status:      Spouse name: Not on file    Number of children: Not on file    Years of education: Not on file    Highest education level: Bachelor's degree (e.g., BA, AB, BS)   Occupational History    Not on file   Tobacco Use    Smoking status: Former     Types: Cigarettes    Smokeless tobacco: Never    Tobacco comments:     20 y.a   Vaping Use    Vaping Use: Never used   Substance and Sexual Activity    Alcohol use: Not Currently     Comment: stopped in 2020    Drug use: Yes     Frequency: 5.0 times per week     Types: Marijuana, Oral     Comment: In evening before bed to sleep    Sexual activity: Yes     Partners: Male   Other Topics Concern    Not on file   Social History Narrative    Retired     Social Determinants of Health     Financial Resource Strain: Low Risk  (1/22/2024)    Overall Financial Resource Strain (CARDIA)     Difficulty of Paying Living Expenses: Not hard at all   Food Insecurity: No Food Insecurity (1/22/2024)    Hunger Vital Sign      Worried About Running Out of Food in the Last Year: Never true     Ran Out of Food in the Last Year: Never true   Transportation Needs: No Transportation Needs (1/22/2024)    PRAPARE - Transportation     Lack of Transportation (Medical): No     Lack of Transportation (Non-Medical): No   Physical Activity: Insufficiently Active (1/22/2024)    Exercise Vital Sign     Days of Exercise per Week: 3 days     Minutes of Exercise per Session: 30 min   Stress: No Stress Concern Present (1/22/2024)    [employer] [place] [employer] - Occupational Stress Questionnaire     Feeling of Stress : Not at all   Social Connections: Moderately Isolated (1/22/2024)    Social Connection and Isolation Panel [NHANES]     Frequency of Communication with Friends and Family: More than three times a week     Frequency of Social Gatherings with Friends and Family: Once a week     Attends Voodoo Services: Never     Active Member of Clubs or Organizations: No     Attends Club or Organization Meetings: Never     Marital Status:    Intimate Partner Violence: Not on file   Housing Stability: Low Risk  (1/22/2024)    Housing Stability Vital Sign     Unable to Pay for Housing in the Last Year: No     Number of Places Lived in the Last Year: 1     Unstable Housing in the Last Year: No       Family history:   Family History   Problem Relation Age of Onset    Heart Disease Mother 65    Cancer Father     Heart Disease Brother     Dementia Sister     Stroke Sister     Heart Disease Brother     Hypertension Sister     Hypertension Sister     Hyperlipidemia Sister     Stroke Sister     Glaucoma Maternal Uncle          Current medications:   Current Outpatient Medications   Medication    famotidine (PEPCID) 20 MG Tab    amLODIPine (NORVASC) 2.5 MG Tab    diclofenac sodium (VOLTAREN) 1 % Gel    VITAMIN D PO    VITAMIN E PO    omeprazole (PRILOSEC) 20 MG delayed-release capsule    cyclobenzaprine (FLEXERIL) 5 mg tablet    lidocaine  "(LIDODERM) 5 % Patch    sertraline (ZOLOFT) 50 MG Tab    raloxifene (EVISTA) 60 MG Tab    polyethylene glycol/lytes (MIRALAX) 17 g Pack    hydrocortisone rectal (ANUSOL-HC) 2.5% Cream    TURMERIC PO    Cholecalciferol (VITAMIN D-3) 125 MCG (5000 UT) Tab    docusate sodium (COLACE) 100 MG Cap    atorvastatin (LIPITOR) 10 MG Tab    triamcinolone acetonide (KENALOG) 0.1 % Cream     No current facility-administered medications for this visit.       Medication Allergy:  Allergies   Allergen Reactions    Sulfa Drugs Rash and Unspecified     7/15/19 welting urticaria/rash after taking Bactrim     Penicillins Itching and Unspecified     Blisters           Physical examination:   Vitals:    04/11/24 1509   BP: (!) 142/72   BP Location: Right arm   Patient Position: Sitting   BP Cuff Size: Adult   Pulse: 82   Temp: 36.2 °C (97.2 °F)   TempSrc: Temporal   SpO2: 96%   Weight: 59.1 kg (130 lb 4.7 oz)   Height: 1.575 m (5' 2.01\")       Normal cephalic atraumatic.  There is full range of movement around the neck in all directions without restrictions or discrete pain evoked triggers.  No lower extremity edema.      Neurological  Exam:      Reji Cognitive Assessment (MOCA) Version 7.1    Years of Education: High School Education    TOTAL SCORE: 26/30  (to be scanned into the MEDIA section in the E.M.R.)          Mental status: Awake, alert and fully oriented to person, place, time, and situation. Normal attention and concentration.  Did not appear/act combative,irritable,anxious,paranoid/delusional or aggressive to or with me.    Speech and language: Speech is fluent without errors, clear, intact to repetition, and intact to naming.     Follows 3 step motor commands in sequence without significant delay and correctly.    Cranial nerve exam:  II: Pupils are equally round and reactive to light. Visual fields are intact by confrontation.  III, IV, VI: EOMI, no diplopia, no ptosis.  V: Sensation to light touch is normal over V1-3 " distributions bilaterally.  .  VII: Facial movements are symmetrical. There is no facial droop. .  VIII: Hearing intact to soft speech and finger rub bilaterally  IX: Palate elevates symmetrically, uvula is midline. Dysarthria is not present.  XI: Shoulder shrug are symmetrical and strong.   XII: Tongue protrudes midline.      Motor exam:  Muscle tone is normal in all 4 limbs. and No abnormal movements appreciated.    Muscle strength:    Neck Flexors/Extensors: 5/5       Right  Left  Deltoid   5/5  5/5      Biceps   5/5  5/5  Triceps              5/5  5/5   Wrist extensors 5/5  5/5  Wrist flexors  5/5  5/5     5/5  5/5  Interossei  5/5  5/5  Thenar (APB)  5/5  5/5   Hip flexors  5/5  5/5  Quadriceps  5/5  5/5    Hamstrings  5/5  5/5  Dorsiflexors  5/5  5/5  Plantarflexors  5/5  5/5  Toe extension  5/5  5/5    Reflexes:       Right  Left  Biceps   2/4  2/4  Triceps              2/4  2/4  Brachioradialis 2/4  2/4  Knee jerk  2/4  2/4  Ankle jerk  2/4  2/4     Frontal release signs are absent    bilaterally toes are downgoing to plantar stimulation..    Coordination (finger-to-nose, heel/knee/shin, rapid alternating movements) was normal.     There was no ataxia, no tremors, and no dysmetria.     Station and gait were normal. Easily stands up from exam chair without retropulsion,veering,leaning,swaying (to either side).       Labs and Tests:     NEUROIMAGING:         Impression/Plans/Recommendations:    Mild Cognitive Impairment- chronic and primarily onset of cognitive symptoms after accidental slip and fall on black ice in December 2022 just outside her home.    She has difficulties maintaining concentration and multi task.     - Post Concussive Syndrome- still quite mild with persistent but chronic diplopia,period frontal headaches (not daily) and memory disturbances (note memory disturbances present for last 1-2 years).     Compensated but not back to normal at this point.     ADL's seem preserved in review  "today.     Brain MRI reviewed from 12/2022.     MOCA score of 27/30 today which is overall encouraging.     She still some difficulties when going to her garage or certain locations to get something and she has \"try hard\" to remember where she is going or why she is going there, she typically can remember these locations.     She still has noticed when writing something \"like a card\" , the words she wants to say does not come to her mind easily.     2.  Diplopia- since head trauma (12/2022)- much improved after placing special lense (left eye).    No other cranial neuropathies identified on exam such as involving the 5th,7th,8th cranial nerves.        Funmilayo never  had diplopia before the 12/2022 event.  She has no  ptosis.     She saw Dr. Mike Hugo (Neuro Eye MD) for evaluation of post traumatic diplopia and she has had special left lense which has resolved or compensated her double vision.       Her double vision is better without glasses on in that the images are closer together.      3. Post Traumatic Headaches- clearly improved and resolved after treatment for diplopia for with special lense placed.      4. HTN- mildly elevated today          Goals going forwards include:     A. Paying attention to one's risk factors and reducing their prevalence or potential impact on one's changing memory/thinking> an excellent example would be to stop smoking, reduce or eliminate alcohol use (depending on the amount and frequency of usage), maintain good blood pressure control by buying a digital arm blood pressure cuff such as an OMRON Series 3 or 5 and checking one's blood pressure atleast 3 days per week (in the am and early afternoon) that the numbers are under 140/90 and working as needed with the primary care doctor  to optimize blood pressure control).        B. Encouraging proper sleep hygiene which for most adults is 7 to 8 hours of uninterrupted sleep per night.       C. Encouraging some form of exercise " preferable aerobic forms to be done (4 to 5 days per week- 30 minutes minimum per day)> 150 minutes per week as a goal. Example activities could include fast walking (up a slight incline), jogging, cycling (road or stationary), swimming,tennis. Essentially, even basic walking on a flat surface or a treadmill would be better than doing nothing.     D. Maintaining or forming new social contacts with family and friends  and a positive attitude despite the concerns and/or ongoing issues with thinking and/or memory.     E. Eating well which means a diet low in salt  (under 2 grams per day), sugar and saturated fat.     F. Maintaining one's BMI (Body Mass Index) under 30.     G. Brain Health issues reviewed today.     H.  Referral to our PhD Neuropsychologist for a more formal evaluation of cognitive ability has already placed this referral on  her behalf.     I. At this time I do not feel another Brain imaging test is needed.    J. Referral to Urologist for urge incontinence which has been occurring for over 6 months or so. (Never had an evaluation for this issue). On average awakens 3 times per night due to urge/urgency to urinate.    K. Goal BP long term under 130/80 reviewed.    I have performed  a history and physical exam and a directed /focused  ROS today.    Total time spent today or this patient's care was 30  minutes  and included reviewing  the diagnostic workup to date (such as labs and imaging as well as interpreting such tests relevant to this patient's neurological condition),  reviewing/obtaining separately obtained history from patient  for today's neurological problem(s) ,counseling and educating the patient and family member on issues related to cognition/memory and cognitive health factors and documenting  the clinical information in the EMR.    Note: I had her prior office visit note opened for much of this visit so the time calculation is not correct for the total time spent during this present visit  (ie, it was 30 minutes).    Follow up in 6 months or so.        Olman Ross MD  [place] [facility].   [id number]

## 2024-04-17 ENCOUNTER — OFFICE VISIT (OUTPATIENT)
Dept: OPHTHALMOLOGY | Facility: MEDICAL CENTER | Age: 69
End: 2024-04-17
Payer: MEDICARE

## 2024-04-17 DIAGNOSIS — H49.9 OPHTHALMOPLEGIA: ICD-10-CM

## 2024-04-17 DIAGNOSIS — H25.13 AGE-RELATED NUCLEAR CATARACT OF BOTH EYES: ICD-10-CM

## 2024-04-17 PROCEDURE — 92060 SENSORIMOTOR EXAMINATION: CPT | Performed by: OPHTHALMOLOGY

## 2024-04-17 PROCEDURE — 99213 OFFICE O/P EST LOW 20 MIN: CPT | Performed by: OPHTHALMOLOGY

## 2024-04-17 ASSESSMENT — SLIT LAMP EXAM - LIDS
COMMENTS: NORMAL
COMMENTS: NORMAL

## 2024-04-17 ASSESSMENT — REFRACTION_MANIFEST
OD_CYLINDER: +0.50
OS_CYLINDER: +0.25
OD_SPHERE: +1.25
OS_SPHERE: +1.75
OS_AXIS: 020
METHOD_AUTOREFRACTION: 1
OD_AXIS: 119

## 2024-04-17 ASSESSMENT — REFRACTION_WEARINGRX
OS_ADD: +2.75
OD_HBASE: OUT
OS_HPRISM: 4.0
OD_ADD: +2.75
OD_CYLINDER: +0.50
OD_HPRISM: 4.0
OS_CYLINDER: +0.50
OS_HBASE: OUT
OS_SPHERE: +1.50
OD_AXIS: 139
OD_SPHERE: +1.25
SPECS_TYPE: TRIFOCAL
OS_AXIS: 035

## 2024-04-17 ASSESSMENT — CONF VISUAL FIELD
OD_INFERIOR_TEMPORAL_RESTRICTION: 0
OS_SUPERIOR_TEMPORAL_RESTRICTION: 0
OS_NORMAL: 1
OD_SUPERIOR_TEMPORAL_RESTRICTION: 0
OS_INFERIOR_TEMPORAL_RESTRICTION: 0
OD_SUPERIOR_NASAL_RESTRICTION: 0
OS_INFERIOR_NASAL_RESTRICTION: 0
OS_SUPERIOR_NASAL_RESTRICTION: 0
OD_NORMAL: 1
OD_INFERIOR_NASAL_RESTRICTION: 0

## 2024-04-17 ASSESSMENT — TONOMETRY
OD_IOP_MMHG: 16
OS_IOP_MMHG: 15

## 2024-04-17 ASSESSMENT — ENCOUNTER SYMPTOMS: DOUBLE VISION: 1

## 2024-04-17 ASSESSMENT — VISUAL ACUITY
OD_CC: 20/20-2
OS_CC: 20/20
METHOD: SNELLEN - LINEAR
OS_CC: J1+
CORRECTION_TYPE: GLASSES
OD_CC: J1+

## 2024-04-17 ASSESSMENT — CUP TO DISC RATIO
OD_RATIO: 0.0
OS_RATIO: 0.0

## 2024-04-17 ASSESSMENT — EXTERNAL EXAM - RIGHT EYE: OD_EXAM: NORMAL

## 2024-04-17 ASSESSMENT — EXTERNAL EXAM - LEFT EYE: OS_EXAM: NORMAL

## 2024-04-17 NOTE — PROGRESS NOTES
Peds/Neuro Ophthalmology:   Michael Hugo M.D.    Date & Time note created:    4/17/2024   11:34 AM     Referring MD / APRN:  Samina Mera M.D., No att. providers found    Patient ID:  Name:             Funmilayo Egan   YOB: 1955  Age:                 68 y.o.  female   MRN:               [id number]    Chief Complaint/Reason for Visit:     Other (ophthalmoplegia)      History of Present Illness:    Funmilayo Egan is a 68 y.o. female   Follow up ophthalmoplegiua with prism use in glasses.No double vision with glasses.Some eye dryness noted.        Review of Systems:  Review of Systems   Eyes:  Positive for double vision.   All other systems reviewed and are negative.      Past Medical History:   Past Medical History:   Diagnosis Date    Allergy     Arthritis     ASTHMA     remote hx    Bowel habit changes     Chest discomfort 04/2019    Coronary CT scan with LAD 11.4.     COPD     mild, not on meds for it    GERD (gastroesophageal reflux disease)     Hyperlipidemia     Hypertension     IBD (inflammatory bowel disease)     OSTEOPOROSIS     Shortness of breath 05/2019    Echocardiogram with normal LV size, LVEF 65%.Trace MR. Mild TR. RVSP 30mmHg.    Traumatic head injury less than 3 months ago        Past Surgical History:  Past Surgical History:   Procedure Laterality Date    HEMORRHOIDECTOMY N/A 4/19/2021    Procedure: HEMORRHOIDECTOMY - INTERNAL AND EXTERNAL, COMPLEX OR EXTENSIVE.;  Surgeon: Estela Kenney M.D.;  Location: SURGERY [facility];  Service: General    OTHER  2020    Esophagus    ABDOMINAL HYSTERECTOMY TOTAL      APPENDECTOMY      HEMORRHOIDECTOMY         Current Outpatient Medications:  Current Outpatient Medications   Medication Sig Dispense Refill    famotidine (PEPCID) 20 MG Tab Take 20 mg by mouth every evening.      amLODIPine (NORVASC) 2.5 MG Tab Take 2.5 mg by mouth every day.      atorvastatin (LIPITOR) 10 MG Tab Take 10 mg by mouth every day.      raloxifene (EVISTA)  60 MG Tab Take 60 mg by mouth every day.      sertraline (ZOLOFT) 50 MG Tab Take 50 mg by mouth every day.      amLODIPine (NORVASC) 2.5 MG Tab TAKE 1 TABLET BY MOUTH EVERY  Tablet 2    diclofenac sodium (VOLTAREN) 1 % Gel Apply 4 g topically 3 times a day as needed (hip pain). 150 g 2    VITAMIN D PO Take  by mouth.      VITAMIN E PO Take  by mouth.      omeprazole (PRILOSEC) 20 MG delayed-release capsule Take 1 Capsule by mouth every day. Gets OTC 1 Capsule 0    cyclobenzaprine (FLEXERIL) 5 mg tablet Take 2 Tablets by mouth 2 times a day as needed for Muscle Spasms or Moderate Pain. 30 Tablet 0    lidocaine (LIDODERM) 5 % Patch Place 1 Patch on the skin every 24 hours as needed (back pain). 10 Patch 3    sertraline (ZOLOFT) 50 MG Tab TAKE 1 TABLET BY MOUTH EVERY DAY 90 Tablet 3    raloxifene (EVISTA) 60 MG Tab Take 1 Tablet by mouth every day. 90 Tablet 3    polyethylene glycol/lytes (MIRALAX) 17 g Pack Take 17 g by mouth every day.      triamcinolone acetonide (KENALOG) 0.1 % Cream Apply 1 Application topically 2 times a day. 45 g 0    hydrocortisone rectal (ANUSOL-HC) 2.5% Cream Insert 1 Application into the rectum 2 times a day. 28 g 0    TURMERIC PO Take 2 Tablets by mouth every morning.      Cholecalciferol (VITAMIN D-3) 125 MCG (5000 UT) Tab Take 5,000 Units by mouth every 7 days. On Monday      docusate sodium (COLACE) 100 MG Cap Take 100 mg by mouth 2 times a day.      atorvastatin (LIPITOR) 10 MG Tab Take 1 Tablet by mouth every evening. 100 Tablet 3     No current facility-administered medications for this visit.       Allergies:  Allergies   Allergen Reactions    Sulfa Drugs Rash and Unspecified     7/15/19 welting urticaria/rash after taking Bactrim     Penicillins Itching and Unspecified     Blisters       Family History:  Family History   Problem Relation Age of Onset    Heart Disease Mother 65    Cancer Father     Heart Disease Brother     Dementia Sister     Stroke Sister     Heart Disease  Brother     Hypertension Sister     Hypertension Sister     Hyperlipidemia Sister     Stroke Sister     Glaucoma Maternal Uncle        Social History:  Social History     Socioeconomic History    Marital status:      Spouse name: Not on file    Number of children: Not on file    Years of education: Not on file    Highest education level: Bachelor's degree (e.g., BA, AB, BS)   Occupational History    Not on file   Tobacco Use    Smoking status: Former     Types: Cigarettes    Smokeless tobacco: Never    Tobacco comments:     20 y.a   Vaping Use    Vaping Use: Never used   Substance and Sexual Activity    Alcohol use: Not Currently     Comment: stopped in 2020    Drug use: Yes     Frequency: 5.0 times per week     Types: Marijuana, Oral     Comment: In evening before bed to sleep    Sexual activity: Yes     Partners: Male   Other Topics Concern    Not on file   Social History Narrative    Retired     Social Determinants of Health     Financial Resource Strain: Low Risk  (1/22/2024)    Overall Financial Resource Strain (CARDIA)     Difficulty of Paying Living Expenses: Not hard at all   Food Insecurity: No Food Insecurity (1/22/2024)    Hunger Vital Sign     Worried About Running Out of Food in the Last Year: Never true     Ran Out of Food in the Last Year: Never true   Transportation Needs: No Transportation Needs (1/22/2024)    PRAPARE - Transportation     Lack of Transportation (Medical): No     Lack of Transportation (Non-Medical): No   Physical Activity: Insufficiently Active (1/22/2024)    Exercise Vital Sign     Days of Exercise per Week: 3 days     Minutes of Exercise per Session: 30 min   Stress: No Stress Concern Present (1/22/2024)    [place] [employer] - Occupational Stress Questionnaire     Feeling of Stress : Not at all   Social Connections: Moderately Isolated (1/22/2024)    Social Connection and Isolation Panel [NHANES]     Frequency of Communication with Friends and Family:  More than three times a week     Frequency of Social Gatherings with Friends and Family: Once a week     Attends Bahai Services: Never     Active Member of Clubs or Organizations: No     Attends Club or Organization Meetings: Never     Marital Status:    Intimate Partner Violence: Not on file   Housing Stability: Low Risk  (1/22/2024)    Housing Stability Vital Sign     Unable to Pay for Housing in the Last Year: No     Number of Places Lived in the Last Year: 1     Unstable Housing in the Last Year: No          Physical Exam:  Physical Exam    Oriented x 3  Weight/BMI: There is no height or weight on file to calculate BMI.  There were no vitals taken for this visit.    Base Eye Exam       Visual Acuity (Snellen - Linear)         Right Left    Dist cc 20/20-2 20/20    Near cc J1+ J1+      Correction: Glasses              Tonometry (i care, 9:56 AM)         Right Left    Pressure 16 15              Pupils         Pupils    Right PERRL    Left PERRL              Visual Fields         Right Left     Full Full                  Additional Tests       Color         Right Left    Ishihara 9/9 9/9              Stereo       Fly: +    Animals: 3/3    Circles: 4/9                  Strabismus Exam         0 0 0   0 0 0                       0  0  E(T) 8 0  0                       0 0 0   0 0 0                       Slit Lamp and Fundus Exam       External Exam         Right Left    External Normal Normal              Slit Lamp Exam         Right Left    Lids/Lashes Normal Normal    Conjunctiva/Sclera White and quiet White and quiet    Cornea Clear Clear    Anterior Chamber Deep and quiet Deep and quiet    Iris Round and reactive Round and reactive    Lens Nuclear sclerosis Nuclear sclerosis    Vitreous Normal Normal              Fundus Exam         Right Left    Disc Normal Normal    C/D Ratio 0.0 0.0    Macula Normal Normal    Vessels Normal Normal    Periphery Normal Normal                  Refraction       Wearing  Rx         Sphere Cylinder Axis Add Horz Prism    Right +1.25 +0.50 139 +2.75 4.0 out    Left +1.50 +0.50 035 +2.75 4.0 out      Age: 1yr    Type: Trifocal              Manifest Refraction (Auto)         Sphere Cylinder Axis    Right +1.25 +0.50 119    Left +1.75 +0.25 020                    Pertinent Lab/Test/Imaging Review:      Assessment and Plan:     Ophthalmoplegia  8/14/2023-complaint of the relatively cute onset of double vision especially for faraway after a concussion in December 2022 versus she slipped on some ice fell back and hit her head.  On examination her extraocular motility is full although she does have a 8 diopter intermittent esotropia that can build.  I did review the MRI scan done at [facility] dated 12/20/2022 which showed possible asymmetric thickening of the right medial rectus muscle.  Therefore this is the breakdown of an underlying strabismus esotropia, versus difficulty compensating for a possible early thyroid orbitopathy, versus myasthenia.  There was some variability in her measurement and she seemed to tolerate a 8 diopter base out prism which I placed over the left lens.  In addition I suggested she obtain thyroid antibodies as well as TSH receptor antibody and a myasthenia gravis panel.  8/21/2023 - Ach receptor antibodies negative, TSH receptor and thyroglobulin ab negative  10/12/2020-did well with 8 diopter base out press on prism over the left eye.  We will give Rx ground in 4 base out OU  4/17/2024 -overall doing excellent with Rx for base out ground in prism OU.  No progression    Age-related nuclear cataract of both eyes  8/14/2023-early nuclear sclerotic cataracts.  Not visually significant at this time  10/12/2023-stable  4/17/2024-overall stable no progression      Michael Hugo M.D.

## 2024-04-17 NOTE — ASSESSMENT & PLAN NOTE
8/14/2023-early nuclear sclerotic cataracts.  Not visually significant at this time  10/12/2023-stable  4/17/2024-overall stable no progression

## 2024-04-17 NOTE — ASSESSMENT & PLAN NOTE
8/14/2023-complaint of the relatively cute onset of double vision especially for faraway after a concussion in December 2022 versus she slipped on some ice fell back and hit her head.  On examination her extraocular motility is full although she does have a 8 diopter intermittent esotropia that can build.  I did review the MRI scan done at [facility] dated 12/20/2022 which showed possible asymmetric thickening of the right medial rectus muscle.  Therefore this is the breakdown of an underlying strabismus esotropia, versus difficulty compensating for a possible early thyroid orbitopathy, versus myasthenia.  There was some variability in her measurement and she seemed to tolerate a 8 diopter base out prism which I placed over the left lens.  In addition I suggested she obtain thyroid antibodies as well as TSH receptor antibody and a myasthenia gravis panel.  8/21/2023 - Ach receptor antibodies negative, TSH receptor and thyroglobulin ab negative  10/12/2020-did well with 8 diopter base out press on prism over the left eye.  We will give Rx ground in 4 base out OU  4/17/2024 -overall doing excellent with Rx for base out ground in prism OU.  No progression

## 2024-04-25 ENCOUNTER — APPOINTMENT (OUTPATIENT)
Dept: MEDICAL GROUP | Facility: IMAGING CENTER | Age: 69
End: 2024-04-25
Payer: MEDICARE

## 2024-05-14 ENCOUNTER — HOSPITAL ENCOUNTER (OUTPATIENT)
Dept: RADIOLOGY | Facility: MEDICAL CENTER | Age: 69
End: 2024-05-14
Attending: FAMILY MEDICINE
Payer: MEDICARE

## 2024-05-14 DIAGNOSIS — M81.0 AGE-RELATED OSTEOPOROSIS WITHOUT CURRENT PATHOLOGICAL FRACTURE: ICD-10-CM

## 2024-05-16 ENCOUNTER — APPOINTMENT (OUTPATIENT)
Dept: MEDICAL GROUP | Facility: IMAGING CENTER | Age: 69
End: 2024-05-16
Payer: MEDICARE

## 2024-05-16 ENCOUNTER — OFFICE VISIT (OUTPATIENT)
Dept: MEDICAL GROUP | Facility: IMAGING CENTER | Age: 69
End: 2024-05-16
Payer: MEDICARE

## 2024-05-16 VITALS
OXYGEN SATURATION: 98 % | TEMPERATURE: 97.7 F | WEIGHT: 130.2 LBS | DIASTOLIC BLOOD PRESSURE: 80 MMHG | BODY MASS INDEX: 23.96 KG/M2 | HEART RATE: 82 BPM | HEIGHT: 62 IN | RESPIRATION RATE: 14 BRPM | SYSTOLIC BLOOD PRESSURE: 130 MMHG

## 2024-05-16 DIAGNOSIS — K22.70 BARRETT'S ESOPHAGUS WITHOUT DYSPLASIA: ICD-10-CM

## 2024-05-16 DIAGNOSIS — H53.2 DIPLOPIA: ICD-10-CM

## 2024-05-16 DIAGNOSIS — G31.84 MILD COGNITIVE IMPAIRMENT: ICD-10-CM

## 2024-05-16 DIAGNOSIS — I70.0 ATHEROSCLEROSIS OF AORTA (HCC): ICD-10-CM

## 2024-05-16 DIAGNOSIS — M25.9 SHOULDER PROBLEM: ICD-10-CM

## 2024-05-16 DIAGNOSIS — Z86.010 HISTORY OF COLON POLYPS: ICD-10-CM

## 2024-05-16 DIAGNOSIS — M85.88 OSTEOPENIA OF LUMBAR SPINE: ICD-10-CM

## 2024-05-16 DIAGNOSIS — J44.9 CHRONIC OBSTRUCTIVE PULMONARY DISEASE, UNSPECIFIED COPD TYPE (HCC): ICD-10-CM

## 2024-05-16 DIAGNOSIS — K44.9 HIATAL HERNIA: ICD-10-CM

## 2024-05-16 DIAGNOSIS — Z12.31 ENCOUNTER FOR SCREENING MAMMOGRAM FOR MALIGNANT NEOPLASM OF BREAST: ICD-10-CM

## 2024-05-16 PROBLEM — Z86.0100 HISTORY OF COLON POLYPS: Status: ACTIVE | Noted: 2024-05-16

## 2024-05-16 PROCEDURE — 99214 OFFICE O/P EST MOD 30 MIN: CPT | Performed by: FAMILY MEDICINE

## 2024-05-16 PROCEDURE — 3075F SYST BP GE 130 - 139MM HG: CPT | Performed by: FAMILY MEDICINE

## 2024-05-16 PROCEDURE — 3079F DIAST BP 80-89 MM HG: CPT | Performed by: FAMILY MEDICINE

## 2024-05-16 ASSESSMENT — PAIN SCALES - GENERAL: PAINLEVEL: NO PAIN

## 2024-05-16 ASSESSMENT — FIBROSIS 4 INDEX: FIB4 SCORE: 1.49

## 2024-05-16 NOTE — PROGRESS NOTES
SUBJECTIVE:    Chief Complaint   Patient presents with    Results     DEXA results   Endoscopy results        HPI:     Funmilayo Egan is a 68 y.o. female for review of test for scan results.  Also had endoscopy.  Upper GI completed 4/2024  Erosive gastropathy, salmon-colored mucosa concerning for Sue's, and small hiatal hernia.  Colonoscopy 4/2024  On omeprazole daily and pepcid in evening.     Neurology following for [facility].  Recommended book-mind diet.  With glasses is fine, without it, sees double vision.  She has been seeing the neuro-ophthalmologist.    Osteopenia on DEXA.  States she has been on raloxifene for about 10 years and was on something else in the past.    Lumbar T-score -1.7 prior, now -1. 6  Femur T-score -0.5 prior, now -0.4     Physical therapy is helping her shoulder.  Will plan for more physical therapy.  PT helping shoulder, more in future.     COPD-has been stable.  No medication, hx of of medication in past. Worked in [employer]. No sob. Some nasal congestion. Snores.     Atherosclerosis-stable.    Mammogram-will be due.  Notes past history of hepatitis B vaccination.  History of colon polyps.  Health Maintenance Due   Topic Date Due    Annual Pulmonary Function Test / Spirometry  Never done    Hepatitis B Vaccine (Hep B) (2 of 3 - Hep B Twinrix 3-dose series) 01/05/2007    Mammogram  05/23/2024         ROS:  No recent fevers or chills. No nausea or vomiting. No diarrhea. No chest pains or shortness of breath. No lower extremity edema.    Current Outpatient Medications on File Prior to Visit   Medication Sig Dispense Refill    famotidine (PEPCID) 20 MG Tab Take 20 mg by mouth every evening.      amLODIPine (NORVASC) 2.5 MG Tab Take 2.5 mg by mouth every day.      sertraline (ZOLOFT) 50 MG Tab Take 50 mg by mouth every day.      diclofenac sodium (VOLTAREN) 1 % Gel Apply 4 g topically 3 times a day as needed (hip pain). 150 g 2    VITAMIN D PO Take  by mouth.      VITAMIN E PO Take  by mouth.       omeprazole (PRILOSEC) 20 MG delayed-release capsule Take 1 Capsule by mouth every day. Gets OTC 1 Capsule 0    cyclobenzaprine (FLEXERIL) 5 mg tablet Take 2 Tablets by mouth 2 times a day as needed for Muscle Spasms or Moderate Pain. 30 Tablet 0    lidocaine (LIDODERM) 5 % Patch Place 1 Patch on the skin every 24 hours as needed (back pain). 10 Patch 3    raloxifene (EVISTA) 60 MG Tab Take 1 Tablet by mouth every day. 90 Tablet 3    polyethylene glycol/lytes (MIRALAX) 17 g Pack Take 17 g by mouth every day.      triamcinolone acetonide (KENALOG) 0.1 % Cream Apply 1 Application topically 2 times a day. 45 g 0    hydrocortisone rectal (ANUSOL-HC) 2.5% Cream Insert 1 Application into the rectum 2 times a day. 28 g 0    TURMERIC PO Take 2 Tablets by mouth every morning.      Cholecalciferol (VITAMIN D-3) 125 MCG (5000 UT) Tab Take 5,000 Units by mouth every 7 days. On Monday      docusate sodium (COLACE) 100 MG Cap Take 100 mg by mouth 2 times a day.      atorvastatin (LIPITOR) 10 MG Tab Take 1 Tablet by mouth every evening. 100 Tablet 3    atorvastatin (LIPITOR) 10 MG Tab Take 10 mg by mouth every day.      raloxifene (EVISTA) 60 MG Tab Take 60 mg by mouth every day.      amLODIPine (NORVASC) 2.5 MG Tab TAKE 1 TABLET BY MOUTH EVERY  Tablet 2    sertraline (ZOLOFT) 50 MG Tab TAKE 1 TABLET BY MOUTH EVERY DAY 90 Tablet 3     No current facility-administered medications on file prior to visit.       Allergies   Allergen Reactions    Sulfa Drugs Rash and Unspecified     7/15/19 welting urticaria/rash after taking Bactrim     Penicillins Itching and Unspecified     Blisters       Patient Active Problem List    Diagnosis Date Noted    Chronic obstructive pulmonary disease, unspecified (HCC) 04/11/2024    Hypertension 04/11/2024    Gastro-esophageal reflux disease without esophagitis 04/11/2024    Osteoporosis 04/11/2024    Atherosclerosis of aorta (HCC) 03/13/2024    Post concussive syndrome 12/01/2023    Mild  "cognitive impairment 12/01/2023    Ophthalmoplegia 08/14/2023    Age-related nuclear cataract of both eyes 08/14/2023    Glaucoma suspect 08/14/2023    Concussion with no loss of consciousness 06/01/2023    Diplopia 06/01/2023    Intractable neck and back pain due to muscle spasms 12/28/2022    Bilateral foot pain 05/17/2022    Scalp irritation 05/17/2022    Gastroesophageal reflux disease without esophagitis 09/10/2021    Generalized abdominal pain 12/21/2020    Sue's esophagus without dysplasia 08/25/2020    Urge incontinence of urine 04/12/2019    Hair pulling 03/12/2019    Essential hypertension 03/12/2019    Age-related osteoporosis without current pathological fracture 03/12/2019    Dyslipidemia 03/12/2019    Benign neoplasm of ascending colon 06/26/2018    Benign neoplasm of cecum 06/26/2018       Past Medical History:   Diagnosis Date    Allergy     Arthritis     ASTHMA     remote hx    Bowel habit changes     Chest discomfort 04/2019    Coronary CT scan with LAD 11.4.     COPD     mild, not on meds for it    GERD (gastroesophageal reflux disease)     Hyperlipidemia     Hypertension     IBD (inflammatory bowel disease)     OSTEOPOROSIS     Shortness of breath 05/2019    Echocardiogram with normal LV size, LVEF 65%.Trace MR. Mild TR. RVSP 30mmHg.    Traumatic head injury less than 3 months ago          OBJECTIVE:   /80 (BP Location: Left arm, Patient Position: Sitting, BP Cuff Size: Adult)   Pulse 82   Temp 36.5 °C (97.7 °F) (Temporal)   Resp 14   Ht 1.575 m (5' 2\")   Wt 59.1 kg (130 lb 3.2 oz)   SpO2 98%   BMI 23.81 kg/m²   General: Well-developed well-nourished female, no acute distress  Neck: supple, no lymphadenopathy- cervical or supraclavicular, no thyromegaly  Cardiovascular: regular rate and rhythm, no murmurs, gallops, rubs  Lungs: clear to auscultation bilaterally, no wheezes, crackles, or rhonchi  Abdomen: +bowel sounds, soft, nontender, nondistended, no rebound, no guarding, no " hepatosplenomegaly  Extremities: no cyanosis, clubbing, edema  Skin: Warm and dry  Psych: appropriate mood and affect      Narrative & Impression     5/14/2024 1:21 PM     HISTORY/REASON FOR EXAM:  Postmenopausal, hysterectomy, adult bone fracture, family history of osteoporosis, scoliosis, osteopenia L1, L2 and L4 levels.     TECHNIQUE/EXAM DESCRIPTION AND NUMBER OF VIEWS:  Dual x-ray bone densitometry was performed from the L1, L3 and L4 levels and from the proximal left femur utilizing the GE Prodigy unit.     COMPARISON: Bone densitometry scan 4/1/2021.     FINDINGS:  The lumbar spine has a mean bone mineral density of 0.984 g/cm2, with a T score of -1.6 and a Z score of 0.3.     The proximal left femur has a mean bone mineral density of 0.961 g/cm2, with a T score of -0.4 and a Z score of 1.2.     When compared with the most recent study dated 4/1/2021, there has been a 1.1% decrease in the bone mineral density of the lumbar spine and a 1.5% increase in the bone mineral density of the proximal left femur.     IMPRESSION:     According to the World Health Organization classification, bone mineral density of this patient is osteopenic in the lumbar spine and normal in the proximal left femur.     10-year Probability of Fracture:  Major Osteoporotic     13.0%  Hip     1.0%  Population      [place] ()     Based on left femur neck BMD     INTERPRETING LOCATION: [address] [place] [address], [id number]           Exam Ended: 05/14/24  1:37 PM Last Resulted: 05/14/24  1:55 PM        ASSESSMENT/PLAN:    68 y.o.female     1. Sue's esophagus without dysplasia-stable.  Continue Pepcid therapy.  Modify diet.  Follow-up routinely with GI.       2. Mild cognitive impairment-stable.  Follow-up with neurology routinely.       3. Hiatal hernia -stable.  Modify diet.  Continue current medication.  Monitor.       4. Diplopia -stable with her glasses.  Continue routine follow-up with neuro-ophthalmology.       5.  Osteopenia of lumbar spine -patient has long-term therapy with raloxifene.  DEXA appear stable.  Will refer to endocrinology for further recommendations due to patient's long-term history of medication.  Continue routine weightbearing exercise, adequate calcium vitamin D. Referral to Endocrinology      6. Shoulder problem -improving with physical therapy.  Continue physical therapy.  Monitor.       7. Atherosclerosis of aorta (HCC) -stable.  Maintain adequate lipid and blood pressure control.  Monitor.       8. Chronic obstructive pulmonary disease, unspecified COPD type (HCC) -not on medication therapy.  Stable.  Monitor. PULMONARY FUNCTION TESTS -Test requested: Complete Pulmonary Function Test      9. Encounter for screening mammogram for malignant neoplasm of breast  MA-SCREENING MAMMO BILAT W/TOMOSYNTHESIS W/CAD      10. History of colon polyps   Continue routine follow-up with GI.           Return in about 3 months (around 8/16/2024).    This medical record contains text that has been entered with the assistance of computer voice recognition and dictation software.  Therefore, it may contain unintended errors in text, spelling, punctuation, or grammar.

## 2024-05-17 PROBLEM — K44.9 HIATAL HERNIA: Status: ACTIVE | Noted: 2024-05-17

## 2024-05-22 ENCOUNTER — APPOINTMENT (OUTPATIENT)
Dept: UROLOGY | Facility: MEDICAL CENTER | Age: 69
End: 2024-05-22
Payer: MEDICARE

## 2024-07-30 ENCOUNTER — OFFICE VISIT (OUTPATIENT)
Dept: MEDICAL GROUP | Facility: IMAGING CENTER | Age: 69
End: 2024-07-30
Payer: MEDICARE

## 2024-07-30 VITALS
TEMPERATURE: 97.1 F | WEIGHT: 128 LBS | RESPIRATION RATE: 16 BRPM | DIASTOLIC BLOOD PRESSURE: 76 MMHG | OXYGEN SATURATION: 98 % | HEIGHT: 62 IN | HEART RATE: 86 BPM | BODY MASS INDEX: 23.55 KG/M2 | SYSTOLIC BLOOD PRESSURE: 136 MMHG

## 2024-07-30 DIAGNOSIS — Z23 ENCOUNTER FOR ADMINISTRATION OF VACCINE: ICD-10-CM

## 2024-07-30 DIAGNOSIS — A60.00 GENITAL HERPES SIMPLEX, UNSPECIFIED SITE: ICD-10-CM

## 2024-07-30 DIAGNOSIS — B00.1 HERPES LABIALIS: ICD-10-CM

## 2024-07-30 RX ORDER — VALACYCLOVIR HYDROCHLORIDE 500 MG/1
500 TABLET, FILM COATED ORAL 2 TIMES DAILY
Qty: 6 TABLET | Refills: 3 | Status: SHIPPED | OUTPATIENT
Start: 2024-07-30

## 2024-07-30 ASSESSMENT — FIBROSIS 4 INDEX: FIB4 SCORE: 1.49

## 2024-08-26 ENCOUNTER — PATIENT MESSAGE (OUTPATIENT)
Dept: HEALTH INFORMATION MANAGEMENT | Facility: OTHER | Age: 69
End: 2024-08-26

## 2024-10-03 ENCOUNTER — PATIENT MESSAGE (OUTPATIENT)
Dept: HEALTH INFORMATION MANAGEMENT | Facility: OTHER | Age: 69
End: 2024-10-03

## 2024-10-09 ENCOUNTER — APPOINTMENT (OUTPATIENT)
Dept: BEHAVIORAL HEALTH | Facility: CLINIC | Age: 69
End: 2024-10-09
Attending: CLINICAL NEUROPSYCHOLOGIST
Payer: MEDICARE

## 2024-10-10 ENCOUNTER — PATIENT MESSAGE (OUTPATIENT)
Dept: HEALTH INFORMATION MANAGEMENT | Facility: OTHER | Age: 69
End: 2024-10-10

## 2024-10-15 ENCOUNTER — APPOINTMENT (OUTPATIENT)
Dept: NEUROLOGY | Facility: MEDICAL CENTER | Age: 69
End: 2024-10-15
Attending: PSYCHIATRY & NEUROLOGY
Payer: MEDICARE

## 2024-12-23 ENCOUNTER — TELEPHONE (OUTPATIENT)
Dept: MEDICAL GROUP | Facility: IMAGING CENTER | Age: 69
End: 2024-12-23
Payer: MEDICARE

## 2024-12-23 RX ORDER — AMLODIPINE BESYLATE 2.5 MG/1
2.5 TABLET ORAL DAILY
Qty: 30 TABLET | Refills: 0 | Status: SHIPPED | OUTPATIENT
Start: 2024-12-23

## 2024-12-23 NOTE — TELEPHONE ENCOUNTER
Patient is requesting a refill for Amlodipine Besylate 2.5 mg tab and a refill for Sertraline HCL 50mg Tablet

## 2024-12-23 NOTE — TELEPHONE ENCOUNTER
Received request via: Patient    Was the patient seen in the last year in this department? Yes    Does the patient have an active prescription (recently filled or refills available) for medication(s) requested? No    Pharmacy Name: [id number]    Does the patient have correction Plus and need 100-day supply? (This applies to ALL medications) Yes, quantity updated to 100 days

## 2025-01-03 ENCOUNTER — APPOINTMENT (OUTPATIENT)
Dept: MEDICAL GROUP | Facility: IMAGING CENTER | Age: 70
End: 2025-01-03
Payer: MEDICARE

## 2025-01-03 VITALS
OXYGEN SATURATION: 97 % | TEMPERATURE: 97.4 F | BODY MASS INDEX: 24.37 KG/M2 | DIASTOLIC BLOOD PRESSURE: 82 MMHG | SYSTOLIC BLOOD PRESSURE: 120 MMHG | HEART RATE: 74 BPM | WEIGHT: 132.4 LBS | RESPIRATION RATE: 20 BRPM | HEIGHT: 62 IN

## 2025-01-03 DIAGNOSIS — E78.5 DYSLIPIDEMIA: ICD-10-CM

## 2025-01-03 DIAGNOSIS — F41.9 ANXIETY: ICD-10-CM

## 2025-01-03 DIAGNOSIS — M81.0 AGE-RELATED OSTEOPOROSIS WITHOUT CURRENT PATHOLOGICAL FRACTURE: ICD-10-CM

## 2025-01-03 DIAGNOSIS — R19.8 GI PROBLEM: ICD-10-CM

## 2025-01-03 DIAGNOSIS — N39.41 URGE INCONTINENCE OF URINE: ICD-10-CM

## 2025-01-03 DIAGNOSIS — K21.9 GASTROESOPHAGEAL REFLUX DISEASE WITHOUT ESOPHAGITIS: ICD-10-CM

## 2025-01-03 DIAGNOSIS — D72.819 LEUKOPENIA, UNSPECIFIED TYPE: ICD-10-CM

## 2025-01-03 DIAGNOSIS — B00.1 HERPES LABIALIS: ICD-10-CM

## 2025-01-03 DIAGNOSIS — R73.01 ELEVATED FASTING GLUCOSE: ICD-10-CM

## 2025-01-03 DIAGNOSIS — J34.89 RHINORRHEA: ICD-10-CM

## 2025-01-03 DIAGNOSIS — J44.9 CHRONIC OBSTRUCTIVE PULMONARY DISEASE, UNSPECIFIED COPD TYPE (HCC): ICD-10-CM

## 2025-01-03 DIAGNOSIS — Z87.19 HISTORY OF BARRETT'S ESOPHAGUS: ICD-10-CM

## 2025-01-03 DIAGNOSIS — G31.84 MILD COGNITIVE IMPAIRMENT: ICD-10-CM

## 2025-01-03 DIAGNOSIS — I10 ESSENTIAL HYPERTENSION: ICD-10-CM

## 2025-01-03 DIAGNOSIS — A60.00 GENITAL HERPES SIMPLEX, UNSPECIFIED SITE: ICD-10-CM

## 2025-01-03 PROCEDURE — 3074F SYST BP LT 130 MM HG: CPT | Performed by: FAMILY MEDICINE

## 2025-01-03 PROCEDURE — 3079F DIAST BP 80-89 MM HG: CPT | Performed by: FAMILY MEDICINE

## 2025-01-03 PROCEDURE — 99214 OFFICE O/P EST MOD 30 MIN: CPT | Performed by: FAMILY MEDICINE

## 2025-01-03 RX ORDER — AMLODIPINE BESYLATE 2.5 MG/1
2.5 TABLET ORAL DAILY
Qty: 90 TABLET | Refills: 3 | Status: SHIPPED | OUTPATIENT
Start: 2025-01-03

## 2025-01-03 ASSESSMENT — FIBROSIS 4 INDEX: FIB4 SCORE: 1.51

## 2025-01-03 NOTE — PROGRESS NOTES
SUBJECTIVE:    Chief Complaint   Patient presents with    Other     Refills for medication   Pt report taking Micpure for bladder - prescribe in [place] x 2 1/2 months   Pt report taking Domperidone - half hour before meals -pt report for her intestinal - prescribe in [facility] doctor         HPI:     Funmilayo Egan is a 69 y.o. female with hypertension, dyslipidemia, GERD, history of Sue's, history of colon polyps, osteoporosis, and urge incontinence here for routine follow-up and medication refill for sertraline.    States she had a full workup and evaluation in [place].  States she had been smoking MJ. Started about 4 years ago to relax as she was told to stop alcohol intake due to GI issues. Told to stop MJ in [place].   Mind was more clear.   Bladder issues- had test for evaluation, normal. Off oxybutinin. Started on myrbetriq 50 mg, working better for her.     GI issues past several years. Had evaluation in [place].   In past had GI testing done- breath test.   Rifaximin and probiotics for SIBO.   No pain of area now.  If has carbs, bread may have discomfort.   Advised to take mineral oil, MiraLAX and domperidone for bowel movements.    Anxiety-mood has otherwise been stable on sertraline 50 mg daily.    Notes that she also had a facelift and since then has had some rhinorrhea since she has returned here.  Has been using a nasal rinse regularly.    COPD-no active issues.  Used to work in [employer].  Does not use any inhalers.  No chest pains or shortness of breath.      Osteoporosis-continues on Evista.    HSV-valacyclovir as needed.  Notes previously took muscle relaxer as needed.    ROS:  No recent fevers or chills. No nausea or vomiting. No diarrhea. No chest pains or shortness of breath. No lower extremity edema.    Current Outpatient Medications on File Prior to Visit   Medication Sig Dispense Refill    valACYclovir (VALTREX) 500 MG Tab Take 1 Tablet by mouth 2 times a day. 6 Tablet 3    raloxifene (EVISTA) 60  MG Tab TAKE 1 TABLET BY MOUTH EVERY DAY 90 Tablet 3    VITAMIN E PO Take  by mouth.      lidocaine (LIDODERM) 5 % Patch Place 1 Patch on the skin every 24 hours as needed (back pain). 10 Patch 3    polyethylene glycol/lytes (MIRALAX) 17 g Pack Take 17 g by mouth every day.      triamcinolone acetonide (KENALOG) 0.1 % Cream Apply 1 Application topically 2 times a day. 45 g 0    hydrocortisone rectal (ANUSOL-HC) 2.5% Cream Insert 1 Application into the rectum 2 times a day. 28 g 0    TURMERIC PO Take 2 Tablets by mouth every morning.      Cholecalciferol (VITAMIN D-3) 125 MCG (5000 UT) Tab Take 5,000 Units by mouth every 7 days. On Monday      atorvastatin (LIPITOR) 10 MG Tab Take 1 Tablet by mouth every evening. 100 Tablet 3    VITAMIN D PO Take  by mouth. (Patient not taking: Reported on 1/3/2025)       No current facility-administered medications on file prior to visit.       Allergies   Allergen Reactions    Sulfa Drugs Rash and Unspecified     7/15/19 welting urticaria/rash after taking Bactrim     Penicillins Itching and Unspecified     Blisters       Patient Active Problem List    Diagnosis Date Noted    Hiatal hernia 05/17/2024    History of colon polyps 05/16/2024    Chronic obstructive pulmonary disease, unspecified (HCC) 04/11/2024    Hypertension 04/11/2024    Gastro-esophageal reflux disease without esophagitis 04/11/2024    Osteoporosis 04/11/2024    Atherosclerosis of aorta (HCC) 03/13/2024    Post concussive syndrome 12/01/2023    Mild cognitive impairment 12/01/2023    Ophthalmoplegia 08/14/2023    Age-related nuclear cataract of both eyes 08/14/2023    Glaucoma suspect 08/14/2023    Concussion with no loss of consciousness 06/01/2023    Diplopia 06/01/2023    Intractable neck and back pain due to muscle spasms 12/28/2022    Bilateral foot pain 05/17/2022    Scalp irritation 05/17/2022    Gastroesophageal reflux disease without esophagitis 09/10/2021    Generalized abdominal pain 12/21/2020     "Sue's esophagus without dysplasia 08/25/2020    Urge incontinence of urine 04/12/2019    Hair pulling 03/12/2019    Essential hypertension 03/12/2019    Age-related osteoporosis without current pathological fracture 03/12/2019    Dyslipidemia 03/12/2019    Benign neoplasm of ascending colon 06/26/2018    Benign neoplasm of cecum 06/26/2018       Past Medical History:   Diagnosis Date    Allergy     Arthritis     ASTHMA     remote hx    Bowel habit changes     Chest discomfort 04/2019    Coronary CT scan with LAD 11.4.     COPD     mild, not on meds for it    GERD (gastroesophageal reflux disease)     Hyperlipidemia     Hypertension     IBD (inflammatory bowel disease)     OSTEOPOROSIS     Shortness of breath 05/2019    Echocardiogram with normal LV size, LVEF 65%.Trace MR. Mild TR. RVSP 30mmHg.    Traumatic head injury less than 3 months ago          OBJECTIVE:   /82 (BP Location: Left arm, Patient Position: Sitting, BP Cuff Size: Adult)   Pulse 74   Temp 36.3 °C (97.4 °F) (Temporal)   Resp 20   Ht 1.575 m (5' 2.01\")   Wt 60.1 kg (132 lb 6.4 oz)   SpO2 97%   BMI 24.21 kg/m²   General: Well-developed well-nourished female, no acute distress  HEENT: oropharynx clear, eyes clear, nose clear  Neck: supple, no lymphadenopathy- cervical or supraclavicular, no thyromegaly  Cardiovascular: regular rate and rhythm, no murmurs, gallops, rubs  Lungs: clear to auscultation bilaterally, no wheezes, crackles, or rhonchi  Abdomen: +bowel sounds, soft, nontender, nondistended, no rebound, no guarding, no hepatosplenomegaly  Extremities: no cyanosis, clubbing, edema  Skin: Warm and dry  Psych: appropriate mood and affect        ASSESSMENT/PLAN:    69 y.o.female       1. GI problem-Notes improvement after treatment for SIBO.  Recommend modified diet.  Consider prime tea.  Recommend adequate probiotic and prebiotic and diet.  Monitor. VITAMIN D,25 HYDROXY (DEFICIENCY)      2. Gastroesophageal reflux disease without " esophagitis-had evaluation and resolved.  She is currently off PPI therapy. CBC WITH DIFFERENTIAL    Comp Metabolic Panel    VITAMIN D,25 HYDROXY (DEFICIENCY)      3. History of Sue's esophagus  VITAMIN D,25 HYDROXY (DEFICIENCY)      4. Mild cognitive impairment-Notes improvement since discontinuing MJ.  Monitor.       5. Urge incontinence of urine-stable.  Currently on Myrbetriq 50 mg therapy from [place].       6. Anxiety-stable.  Continue sertraline 50 mg daily. Sertraline 50 mg      7. Essential hypertension-stable.  Continue amlodipine 2.5 mg daily. Amlodipine 2.5 mg      8. Dyslipidemia-stable.  Continue atorvastatin 10 mg daily.  Continue working on low-cholesterol, high-fiber diet and routine exercise. Lipid Profile    TSH WITH REFLEX TO FT4      9. Chronic obstructive pulmonary disease, unspecified COPD type (HCC)-stable.  Not on medication therapy.  History of working in [employer].  Monitor.       10. Age-related osteoporosis without current pathological fracture-stable.  She will continue on raloxifene therapy at this time.  Monitor. VITAMIN D,25 HYDROXY (DEFICIENCY)      11. Genital herpes simplex, unspecified site-stable.  Valacyclovir as needed.       12. Herpes labialis-stable.  Valacyclovir as needed.       13. Elevated fasting glucose   Monitor. Comp Metabolic Panel    HEMOGLOBIN A1C      14. Leukopenia, unspecified type   Monitor. CBC WITH DIFFERENTIAL      15. Rhinorrhea -component may be associate with allergies.  She may continue with nasal sinus rinse.  Consider OTC medications as needed.  Continue to monitor.           Chi will plan to follow-up in 1 month as scheduled.    This medical record contains text that has been entered with the assistance of computer voice recognition and dictation software.  Therefore, it may contain unintended errors in text, spelling, punctuation, or grammar.

## 2025-01-30 ENCOUNTER — OFFICE VISIT (OUTPATIENT)
Dept: MEDICAL GROUP | Facility: IMAGING CENTER | Age: 70
End: 2025-01-30
Payer: MEDICARE

## 2025-01-30 VITALS
TEMPERATURE: 98.2 F | HEIGHT: 62 IN | OXYGEN SATURATION: 96 % | BODY MASS INDEX: 24.03 KG/M2 | RESPIRATION RATE: 16 BRPM | HEART RATE: 89 BPM | DIASTOLIC BLOOD PRESSURE: 62 MMHG | WEIGHT: 130.6 LBS | SYSTOLIC BLOOD PRESSURE: 120 MMHG

## 2025-01-30 DIAGNOSIS — M85.88 OSTEOPENIA OF LUMBAR SPINE: ICD-10-CM

## 2025-01-30 DIAGNOSIS — A60.00 GENITAL HERPES SIMPLEX, UNSPECIFIED SITE: ICD-10-CM

## 2025-01-30 DIAGNOSIS — Z87.19 HISTORY OF BARRETT'S ESOPHAGUS: ICD-10-CM

## 2025-01-30 DIAGNOSIS — H53.2 DIPLOPIA: ICD-10-CM

## 2025-01-30 DIAGNOSIS — F41.9 ANXIETY: ICD-10-CM

## 2025-01-30 DIAGNOSIS — Z23 NEED FOR HEPATITIS B VACCINATION: ICD-10-CM

## 2025-01-30 DIAGNOSIS — N39.41 URGE INCONTINENCE OF URINE: ICD-10-CM

## 2025-01-30 DIAGNOSIS — R19.8 GI PROBLEM: ICD-10-CM

## 2025-01-30 DIAGNOSIS — Z86.0100 HISTORY OF COLON POLYPS: ICD-10-CM

## 2025-01-30 DIAGNOSIS — B00.1 HERPES LABIALIS: ICD-10-CM

## 2025-01-30 DIAGNOSIS — H40.003 GLAUCOMA SUSPECT OF BOTH EYES: ICD-10-CM

## 2025-01-30 DIAGNOSIS — K44.9 HIATAL HERNIA: ICD-10-CM

## 2025-01-30 DIAGNOSIS — I10 ESSENTIAL HYPERTENSION: ICD-10-CM

## 2025-01-30 DIAGNOSIS — Z00.00 MEDICARE ANNUAL WELLNESS VISIT, SUBSEQUENT: ICD-10-CM

## 2025-01-30 DIAGNOSIS — M81.0 OSTEOPOROSIS WITHOUT CURRENT PATHOLOGICAL FRACTURE, UNSPECIFIED OSTEOPOROSIS TYPE: ICD-10-CM

## 2025-01-30 DIAGNOSIS — E78.5 DYSLIPIDEMIA: ICD-10-CM

## 2025-01-30 DIAGNOSIS — H49.9 OPHTHALMOPLEGIA: ICD-10-CM

## 2025-01-30 DIAGNOSIS — H25.13 AGE-RELATED NUCLEAR CATARACT OF BOTH EYES: ICD-10-CM

## 2025-01-30 DIAGNOSIS — Z87.39 HISTORY OF OSTEOPOROSIS: ICD-10-CM

## 2025-01-30 DIAGNOSIS — R29.91 MUSCULOSKELETAL SYMPTOM: ICD-10-CM

## 2025-01-30 DIAGNOSIS — I70.0 ATHEROSCLEROSIS OF AORTA (HCC): ICD-10-CM

## 2025-01-30 PROCEDURE — 3074F SYST BP LT 130 MM HG: CPT | Performed by: FAMILY MEDICINE

## 2025-01-30 PROCEDURE — 90746 HEPB VACCINE 3 DOSE ADULT IM: CPT | Performed by: FAMILY MEDICINE

## 2025-01-30 PROCEDURE — G0010 ADMIN HEPATITIS B VACCINE: HCPCS | Performed by: FAMILY MEDICINE

## 2025-01-30 PROCEDURE — G0439 PPPS, SUBSEQ VISIT: HCPCS | Mod: 25 | Performed by: FAMILY MEDICINE

## 2025-01-30 PROCEDURE — 3078F DIAST BP <80 MM HG: CPT | Performed by: FAMILY MEDICINE

## 2025-01-30 RX ORDER — VALACYCLOVIR HYDROCHLORIDE 500 MG/1
500 TABLET, FILM COATED ORAL 2 TIMES DAILY
Qty: 6 TABLET | Refills: 3 | Status: SHIPPED | OUTPATIENT
Start: 2025-01-30

## 2025-01-30 RX ORDER — ATORVASTATIN CALCIUM 10 MG/1
10 TABLET, FILM COATED ORAL NIGHTLY
Qty: 100 TABLET | Refills: 3 | Status: SHIPPED | OUTPATIENT
Start: 2025-01-30

## 2025-01-30 ASSESSMENT — ACTIVITIES OF DAILY LIVING (ADL): BATHING_REQUIRES_ASSISTANCE: 0

## 2025-01-30 ASSESSMENT — PATIENT HEALTH QUESTIONNAIRE - PHQ9: CLINICAL INTERPRETATION OF PHQ2 SCORE: 0

## 2025-01-30 ASSESSMENT — FIBROSIS 4 INDEX: FIB4 SCORE: 1.51

## 2025-01-30 ASSESSMENT — ENCOUNTER SYMPTOMS: GENERAL WELL-BEING: EXCELLENT

## 2025-01-30 NOTE — PROGRESS NOTES
Chief Complaint   Patient presents with    Medicare Annual Wellness     Medicare annual    Immunizations     Hep B       HPI:  Funmilayo Egan is a 69 y.o. here for Medicare Annual Wellness Visit   Vaccine in past, helps hsv  Hep B vaccine- to start.   Labs in system.   Taking probiotics. After almonds, next day had cramps. Took apple cider vinegar and improved.   Hiatal hernia- no current issues. Hx of gerd. Has seen GI in [place].   April egd/colonoscopy.     Copd- no issues, hx of [employer] work.   atherosclerosis  Atorvastatin 10 mg.   Hypertension- amlodipine 2.5 mg daily  Anxiety- zolfot 50 mg daily. Helps not to pull her hair.   Valtrex as needed.   Evista- stable.   Glaucoma- ophthalmolgy. Vision better with prior double vision.   No dermatology.     Patient Active Problem List    Diagnosis Date Noted    Hiatal hernia 05/17/2024    History of colon polyps 05/16/2024    Chronic obstructive pulmonary disease, unspecified (HCC) 04/11/2024    Hypertension 04/11/2024    Gastro-esophageal reflux disease without esophagitis 04/11/2024    Osteoporosis 04/11/2024    Atherosclerosis of aorta (HCC) 03/13/2024    Post concussive syndrome 12/01/2023    Mild cognitive impairment 12/01/2023    Ophthalmoplegia 08/14/2023    Age-related nuclear cataract of both eyes 08/14/2023    Glaucoma suspect 08/14/2023    Concussion with no loss of consciousness 06/01/2023    Diplopia 06/01/2023    Intractable neck and back pain due to muscle spasms 12/28/2022    Bilateral foot pain 05/17/2022    Scalp irritation 05/17/2022    Gastroesophageal reflux disease without esophagitis 09/10/2021    Generalized abdominal pain 12/21/2020    Sue's esophagus without dysplasia 08/25/2020    Urge incontinence of urine 04/12/2019    Hair pulling 03/12/2019    Essential hypertension 03/12/2019    Age-related osteoporosis without current pathological fracture 03/12/2019    Dyslipidemia 03/12/2019    Benign neoplasm of ascending colon 06/26/2018    Benign  neoplasm of cecum 06/26/2018       Current Outpatient Medications   Medication Sig Dispense Refill    amLODIPine (NORVASC) 2.5 MG Tab Take 1 Tablet by mouth every day. 90 Tablet 3    sertraline (ZOLOFT) 50 MG Tab Take 1 Tablet by mouth every day. 90 Tablet 3    valACYclovir (VALTREX) 500 MG Tab Take 1 Tablet by mouth 2 times a day. 6 Tablet 3    raloxifene (EVISTA) 60 MG Tab TAKE 1 TABLET BY MOUTH EVERY DAY 90 Tablet 3    VITAMIN D PO Take  by mouth.      VITAMIN E PO Take  by mouth.      lidocaine (LIDODERM) 5 % Patch Place 1 Patch on the skin every 24 hours as needed (back pain). 10 Patch 3    polyethylene glycol/lytes (MIRALAX) 17 g Pack Take 17 g by mouth every day.      triamcinolone acetonide (KENALOG) 0.1 % Cream Apply 1 Application topically 2 times a day. 45 g 0    hydrocortisone rectal (ANUSOL-HC) 2.5% Cream Insert 1 Application into the rectum 2 times a day. 28 g 0    TURMERIC PO Take 2 Tablets by mouth every morning.      Cholecalciferol (VITAMIN D-3) 125 MCG (5000 UT) Tab Take 5,000 Units by mouth every 7 days. On Monday      atorvastatin (LIPITOR) 10 MG Tab Take 1 Tablet by mouth every evening. 100 Tablet 3     No current facility-administered medications for this visit.          Current supplements as per medication list.     Allergies: Sulfa drugs and Penicillins    Current social contact/activities: friends, family     She  reports that she has quit smoking. Her smoking use included cigarettes. She has never used smokeless tobacco. She reports that she does not currently use alcohol. She reports that she does not currently use drugs after having used the following drugs: Marijuana and Oral. Frequency: 5.00 times per week.  Counseling given: Not Answered  Tobacco comments: 20 y.a      ROS:    Gait: Uses no assistive device  Ostomy: No  Other tubes: No  Amputations: No  Chronic oxygen use: No  Last eye exam: July 2024 or so, neuro- ophthalmology  Wears hearing aids: No   : Denies any urinary leakage  during the last 6 months    Screening:  Reviewed      Depression Screening  Little interest or pleasure in doing things?  0 - not at all  Feeling down, depressed , or hopeless? 0 - not at all  Patient Health Questionnaire Score: 0     If depressive symptoms identified deferred to follow up visit unless specifically addressed in assessment and plan.    Interpretation of PHQ-9 Total Score   Score Severity   1-4 No Depression   5-9 Mild Depression   10-14 Moderate Depression   15-19 Moderately Severe Depression   20-27 Severe Depression    Screening for Cognitive Impairment  Do you or any of your friends or family members have any concern about your memory? No  Three Minute Recall (Leader, Season, Table) 3/3    Ricky clock face with all 12 numbers and set the hands to show 10 minutes after 11.  Yes    Cognitive concerns identified deferred for follow up unless specifically addressed in assessment and plan.    Fall Risk Assessment  Has the patient had two or more falls in the last year or any fall with injury in the last year?  Yes    Safety Assessment  Do you always wear your seatbelt?  Yes  Any changes to home needed to function safely? No  Difficulty hearing.  No  Patient counseled about all safety risks that were identified.    Functional Assessment ADLs  Are there any barriers preventing you from cooking for yourself or meeting nutritional needs?  No.    Are there any barriers preventing you from driving safely or obtaining transportation?  No.    Are there any barriers preventing you from using a telephone or calling for help?  No    Are there any barriers preventing you from shopping?  No.    Are there any barriers preventing you from taking care of your own finances?  No    Are there any barriers preventing you from managing your medications?  No    Are there any barriers preventing you from showering, bathing or dressing yourself? No    Are there any barriers preventing you from doing housework or laundry?  No  Are there any barriers preventing you from using the toilet?No  Are you currently engaging in any exercise or physical activity?  Yes[place]  3 - 4 times week    Self-Assessment of Health  What is your perception of your health? Excellent    Do you sleep more than six hours a night? Yes    In the past 7 days, how much did pain keep you from doing your normal work? None    Do you spend quality time with family or friends (virtually or in person)? Yes both  Do you usually eat a heart healthy diet that constists of a variety of fruits, vegetables, whole grains and fiber? No No carbo   Whole grains and fiber   By recommendation by GI doc   Do you eat foods high in fat and/or Fast Food more than three times per week? No    How concerned are you that your medical conditions are not being well managed? Not at all    Are you worried that in the next 2 months, you may not have stable housing that you own, rent, or stay in as part of a household? No      Advance Care Planning  Do you have an Advance Directive, Living Will, Durable Power of , or POLST? No                 Health Maintenance Summary            Ordered - Annual Pulmonary Function Test / Spirometry (Yearly) Ordered on 5/16/2024      No completion history exists for this topic.              Ordered - Mammogram (Yearly) Ordered on 5/16/2024 05/23/2022  MA-SCREENING MAMMO BILAT W/TOMOSYNTHESIS W/CAD    04/01/2021  MA-SCREENING MAMMO BILAT W/TOMOSYNTHESIS W/CAD    04/15/2019  MA-SCREENING MAMMO BILAT W/TOMOSYNTHESIS W/CAD    10/17/2017  MA-SCREENING MAMMO BILAT W/CAD    12/04/2009  MA-SCREENING DIGITAL MAMMO    Only the first 5 history entries have been loaded, but more history exists.              Overdue - Hepatitis B Vaccine (Hep B) (3 of 3 - Hep B Twinrix 3-dose series) Overdue since 12/30/2024 07/30/2024  Imm Admin: Hep A/HEP B Combined Vaccine (TwinRix)    12/08/2006  Imm Admin: Hep A/HEP B Combined Vaccine  (TwinRix)              Overdue - Annual Wellness Visit (Yearly) Order placed this encounter      01/25/2024  Subsequent Annual Wellness Visit - Includes PPPS ()    01/25/2024  Visit Dx: Medicare annual wellness visit, subsequent    12/16/2022  Visit Dx: Medicare annual wellness visit, subsequent              IMM DTaP/Tdap/Td Vaccine (2 - Td or Tdap) Next due on 8/21/2026 08/21/2016  Imm Admin: Tdap Vaccine    12/08/2006  Imm Admin: TD Vaccine              Bone Density Scan (Every 5 Years) Next due on 5/14/2029 05/14/2024  DS-BONE DENSITY STUDY (DEXA)    04/01/2021  DS-BONE DENSITY STUDY (DEXA)    12/04/2009  DS-BONE DENSITY STUDY (DEXA)    03/02/2007  DS-BONE DENSITY STUDY (DEXA)              Colorectal Cancer Screening (Colonoscopy - Preferred) Next due on 4/30/2034 04/30/2024  COLONOSCOPY RESULTS    06/26/2018  REFERRAL TO GI FOR COLONOSCOPY              Zoster (Shingles) Vaccines (Series Information) Completed      04/10/2019  Imm Admin: Zoster Vaccine Recombinant (RZV) (SHINGRIX)    07/23/2018  Imm Admin: Zoster Vaccine Recombinant (RZV) (SHINGRIX)    07/22/2017  Imm Admin: Zoster Vaccine Live (ZVL) (Zostavax) - HISTORICAL DATA              Hepatitis C Screening  Tentatively Complete      07/09/2020  Hepatitis C Antibody component of HEP C VIRUS ANTIBODY    05/10/2019  Hepatitis C Antibody component of HEP C VIRUS ANTIBODY              Pneumococcal Vaccine: 65+ Years (Series Information) Completed      12/16/2022  Imm Admin: Pneumococcal Conjugate Vaccine (PCV20)    07/23/2018  Imm Admin: Pneumococcal polysaccharide vaccine (PPSV-23)              Hepatitis A Vaccine (Hep A) (Series Information) Aged Out      07/30/2024  Imm Admin: Hep A/HEP B Combined Vaccine (TwinRix)    12/08/2006  Imm Admin: Hep A/HEP B Combined Vaccine (TwinRix)              Influenza Vaccine (Series Information) Completed      11/16/2024  Imm Admin: Influenza high-dose trivalent (PF)    09/19/2023  Imm Admin: Influenza  Vaccine, Quadrivalent, Adjuvanted (Pf)    11/03/2022  Imm Admin: Influenza, Unspecified - HISTORICAL DATA    10/15/2021  Imm Admin: Influenza, Unspecified - HISTORICAL DATA    09/15/2020  Imm Admin: Influenza Vaccine Adult HD    Only the first 5 history entries have been loaded, but more history exists.              COVID-19 Vaccine (Series Information) Completed      11/16/2024  Imm Admin: COVID-19, mRNA, LNP-S, PF, jeremy-sucrose, 30 mcg/0.3 mL    09/19/2023  Imm Admin: Comirnaty (Covid-19 Vaccine, Mrna, [id number] Formula)    11/03/2022  Imm Admin: MODERNA BIVALENT BOOSTER SARS-COV-2 VACCINE (6+)    05/10/2022  Imm Admin: MODERNA SARS-COV-2 VACCINE (12+)    11/20/2021  Imm Admin: MODERNA SARS-COV-2 VACCINE (12+)    Only the first 5 history entries have been loaded, but more history exists.              HPV Vaccines (Series Information) Aged Out      No completion history exists for this topic.              Polio Vaccine (Inactivated Polio) (Series Information) Aged Out      No completion history exists for this topic.              Meningococcal Immunization (Series Information) Aged Out      No completion history exists for this topic.              Discontinued - Cervical Cancer Screening  Discontinued        Frequency changed to Never automatically (Topic No Longer Applies)    04/26/2019  THINPREP PAP WITH HPV    04/26/2019  Pathology Gynecology Specimen                    Patient Care Team:  Samina Mera M.D. as PCP - General (Family Medicine)  Nate Luther Ass't as [facility]         Social History     Tobacco Use    Smoking status: Former     Types: Cigarettes    Smokeless tobacco: Never    Tobacco comments:     20 y.a   Vaping Use    Vaping status: Never Used   Substance Use Topics    Alcohol use: Not Currently     Comment: stopped in 2020    Drug use: Not Currently     Frequency: 5.0 times per week     Types: Marijuana, Oral     Comment: In evening before bed to sleep  "    Family History   Problem Relation Age of Onset    Heart Disease Mother 65    Cancer Father     Heart Disease Brother     Dementia Sister     Stroke Sister     Heart Disease Brother     Hypertension Sister     Hypertension Sister     Hyperlipidemia Sister     Stroke Sister     Glaucoma Maternal Uncle      She  has a past medical history of Allergy, Arthritis, ASTHMA, Bowel habit changes, Chest discomfort (04/2019), COPD, GERD (gastroesophageal reflux disease), Hyperlipidemia, Hypertension, IBD (inflammatory bowel disease), OSTEOPOROSIS, Shortness of breath (05/2019), and Traumatic head injury less than 3 months ago.   Past Surgical History:   Procedure Laterality Date    HEMORRHOIDECTOMY N/A 4/19/2021    Procedure: HEMORRHOIDECTOMY - INTERNAL AND EXTERNAL, COMPLEX OR EXTENSIVE.;  Surgeon: Estela Kenney M.D.;  Location: SURGERY [facility];  Service: General    OTHER  2020    Esophagus    ABDOMINAL HYSTERECTOMY TOTAL      APPENDECTOMY      HEMORRHOIDECTOMY         Exam:   /62 (BP Location: Left arm, Patient Position: Sitting, BP Cuff Size: Adult)   Pulse 89   Temp 36.8 °C (98.2 °F) (Temporal)   Resp 16   Ht 1.575 m (5' 2\")   Wt 59.2 kg (130 lb 9.6 oz)   SpO2 96%  Body mass index is 23.89 kg/m².    Hearing good.    Dentition good  Alert, oriented in no acute distress.  Constitutional: She appears well-developed and well-nourished. She appears not diaphoretic. No distress.   HENT: Right Ear: External ear normal. Left Ear: External ear normal. Tympanic membranes clear and intact.   Nose: Nose normal.   Mouth/Throat: Oropharynx is clear and moist. No oropharyngeal exudate.     Eyes: Conjunctivae and extraocular motions are normal. Pupils are equal, round, and reactive to light. No scleral icterus.   Neck: Normal range of motion. Neck supple. No thyromegaly present.   Cardiovascular: Normal rate, regular rhythm, normal heart sounds and intact distal pulses.  Exam reveals no gallop and no friction rub.  " No murmur heard. No carotid bruits.   Pulmonary/Chest: Effort normal and breath sounds normal. No respiratory distress. She has no wheezes. She has no rales.   Abdominal: Soft. Bowel sounds are normal. She exhibits no distension and no mass. No tenderness. She has no rebound and no guarding.   Lymphadenopathy:  She has no cervical adenopathy.   Neurological: She is alert. She has normal reflexes. No cranial nerve deficit. She exhibits normal muscle tone.   Skin: Skin is warm and dry. No rash noted. She is not diaphoretic. No erythema.   Psychiatric: She has a normal mood and affect. Her behavior is normal.   Musculoskeletal: She exhibits no edema. Full strength throughout. 2+ DTR throughout.         Assessment and Plan. The following treatment and monitoring plan is recommended:      70 yo female    1. Herpes labialis  valACYclovir (VALTREX) 500 MG Tab      2. Genital herpes simplex, unspecified site  valACYclovir (VALTREX) 500 MG Tab      3. Dyslipidemia  atorvastatin (LIPITOR) 10 MG Tab      4. Anxiety        5. GI problem        6. Hiatal hernia        7. History of Sue's esophagus        8. History of colon polyps        9. Glaucoma suspect of both eyes        10. Age-related nuclear cataract of both eyes        11. Essential hypertension        12. Ophthalmoplegia        13. Diplopia        14. Osteoporosis without current pathological fracture, unspecified osteoporosis type        Lab orders in placed.   Follow up with GI.   Drinking prime tea.    Refill 50 mg.     Sauerkraut.     Services suggested: No services needed at this time  Health Care Screening: Age-appropriate preventive services recommended by USPTF and ACIP covered by Medicare were discussed today. Services ordered if indicated and agreed upon by the patient.  Referrals offered: Community-based lifestyle interventions to reduce health risks and promote self-management and wellness, fall prevention, nutrition, physical activity, tobacco-use  cessation, weight loss, and mental health services as per orders if indicated.    Discussion today about general wellness and lifestyle habits:    Prevent falls and reduce trip hazards; Cautioned about securing or removing rugs.  Have a working fire alarm and carbon monoxide detector;   Engage in regular physical activity and social activities     Follow-up: No follow-ups on file.         Follow-up: Follow up in 3 months.   Recommend routine AWV.     This medical record contains text that has been entered with the assistance of computer voice recognition and dictation software.  Therefore, it may contain unintended errors in text, spelling, punctuation, or grammar.

## 2025-02-03 PROBLEM — Z87.39 HISTORY OF OSTEOPOROSIS: Status: ACTIVE | Noted: 2025-02-03

## 2025-02-03 PROBLEM — M85.88 OSTEOPENIA OF LUMBAR SPINE: Status: ACTIVE | Noted: 2025-02-03

## 2025-02-03 PROBLEM — M81.0 OSTEOPOROSIS: Status: RESOLVED | Noted: 2024-04-11 | Resolved: 2025-02-03

## 2025-02-28 ENCOUNTER — HOSPITAL ENCOUNTER (OUTPATIENT)
Facility: MEDICAL CENTER | Age: 70
End: 2025-02-28
Attending: FAMILY MEDICINE
Payer: MEDICARE

## 2025-02-28 DIAGNOSIS — D72.819 LEUKOPENIA, UNSPECIFIED TYPE: ICD-10-CM

## 2025-02-28 DIAGNOSIS — M81.0 AGE-RELATED OSTEOPOROSIS WITHOUT CURRENT PATHOLOGICAL FRACTURE: ICD-10-CM

## 2025-02-28 DIAGNOSIS — R73.01 ELEVATED FASTING GLUCOSE: ICD-10-CM

## 2025-02-28 DIAGNOSIS — E78.5 DYSLIPIDEMIA: ICD-10-CM

## 2025-02-28 DIAGNOSIS — K21.9 GASTROESOPHAGEAL REFLUX DISEASE WITHOUT ESOPHAGITIS: ICD-10-CM

## 2025-02-28 DIAGNOSIS — Z87.19 HISTORY OF BARRETT'S ESOPHAGUS: ICD-10-CM

## 2025-02-28 DIAGNOSIS — R19.8 GI PROBLEM: ICD-10-CM

## 2025-02-28 LAB
25(OH)D3 SERPL-MCNC: 38 NG/ML (ref 30–100)
ALBUMIN SERPL BCP-MCNC: 4.5 G/DL (ref 3.2–4.9)
ALBUMIN/GLOB SERPL: 2.1 G/DL
ALP SERPL-CCNC: 47 U/L (ref 30–99)
ALT SERPL-CCNC: 26 U/L (ref 2–50)
ANION GAP SERPL CALC-SCNC: 10 MMOL/L (ref 7–16)
AST SERPL-CCNC: 26 U/L (ref 12–45)
BASOPHILS # BLD AUTO: 0.2 % (ref 0–1.8)
BASOPHILS # BLD: 0.01 K/UL (ref 0–0.12)
BILIRUB SERPL-MCNC: 0.4 MG/DL (ref 0.1–1.5)
BUN SERPL-MCNC: 19 MG/DL (ref 8–22)
CALCIUM ALBUM COR SERPL-MCNC: 8.7 MG/DL (ref 8.5–10.5)
CALCIUM SERPL-MCNC: 9.1 MG/DL (ref 8.4–10.2)
CHLORIDE SERPL-SCNC: 104 MMOL/L (ref 96–112)
CHOLEST SERPL-MCNC: 173 MG/DL (ref 100–199)
CO2 SERPL-SCNC: 26 MMOL/L (ref 20–33)
CREAT SERPL-MCNC: 0.73 MG/DL (ref 0.5–1.4)
EOSINOPHIL # BLD AUTO: 0.19 K/UL (ref 0–0.51)
EOSINOPHIL NFR BLD: 4.5 % (ref 0–6.9)
ERYTHROCYTE [DISTWIDTH] IN BLOOD BY AUTOMATED COUNT: 45.2 FL (ref 35.9–50)
FASTING STATUS PATIENT QL REPORTED: NORMAL
GFR SERPLBLD CREATININE-BSD FMLA CKD-EPI: 89 ML/MIN/1.73 M 2
GLOBULIN SER CALC-MCNC: 2.1 G/DL (ref 1.9–3.5)
GLUCOSE SERPL-MCNC: 84 MG/DL (ref 65–99)
HCT VFR BLD AUTO: 44.5 % (ref 37–47)
HDLC SERPL-MCNC: 73 MG/DL
HGB BLD-MCNC: 15 G/DL (ref 12–16)
IMM GRANULOCYTES # BLD AUTO: 0.01 K/UL (ref 0–0.11)
IMM GRANULOCYTES NFR BLD AUTO: 0.2 % (ref 0–0.9)
LDLC SERPL CALC-MCNC: 85 MG/DL
LYMPHOCYTES # BLD AUTO: 1.04 K/UL (ref 1–4.8)
LYMPHOCYTES NFR BLD: 24.8 % (ref 22–41)
MCH RBC QN AUTO: 31 PG (ref 27–33)
MCHC RBC AUTO-ENTMCNC: 33.7 G/DL (ref 32.2–35.5)
MCV RBC AUTO: 91.9 FL (ref 81.4–97.8)
MONOCYTES # BLD AUTO: 0.44 K/UL (ref 0–0.85)
MONOCYTES NFR BLD AUTO: 10.5 % (ref 0–13.4)
NEUTROPHILS # BLD AUTO: 2.51 K/UL (ref 1.82–7.42)
NEUTROPHILS NFR BLD: 59.8 % (ref 44–72)
NRBC # BLD AUTO: 0 K/UL
NRBC BLD-RTO: 0 /100 WBC (ref 0–0.2)
PLATELET # BLD AUTO: 192 K/UL (ref 164–446)
PMV BLD AUTO: 10.5 FL (ref 9–12.9)
POTASSIUM SERPL-SCNC: 4.7 MMOL/L (ref 3.6–5.5)
PROT SERPL-MCNC: 6.6 G/DL (ref 6–8.2)
RBC # BLD AUTO: 4.84 M/UL (ref 4.2–5.4)
SODIUM SERPL-SCNC: 140 MMOL/L (ref 135–145)
TRIGL SERPL-MCNC: 75 MG/DL (ref 0–149)
TSH SERPL DL<=0.005 MIU/L-ACNC: 0.69 UIU/ML (ref 0.38–5.33)
WBC # BLD AUTO: 4.2 K/UL (ref 4.8–10.8)

## 2025-02-28 PROCEDURE — 82306 VITAMIN D 25 HYDROXY: CPT

## 2025-02-28 PROCEDURE — 80053 COMPREHEN METABOLIC PANEL: CPT

## 2025-02-28 PROCEDURE — 84443 ASSAY THYROID STIM HORMONE: CPT

## 2025-02-28 PROCEDURE — 85025 COMPLETE CBC W/AUTO DIFF WBC: CPT

## 2025-02-28 PROCEDURE — 80061 LIPID PANEL: CPT

## 2025-02-28 PROCEDURE — 36415 COLL VENOUS BLD VENIPUNCTURE: CPT

## 2025-02-28 PROCEDURE — 83036 HEMOGLOBIN GLYCOSYLATED A1C: CPT

## 2025-03-01 LAB
EST. AVERAGE GLUCOSE BLD GHB EST-MCNC: 120 MG/DL
HBA1C MFR BLD: 5.8 % (ref 4–5.6)

## 2025-03-03 ENCOUNTER — APPOINTMENT (OUTPATIENT)
Dept: MEDICAL GROUP | Facility: IMAGING CENTER | Age: 70
End: 2025-03-03
Payer: MEDICARE

## 2025-03-05 ENCOUNTER — RESULTS FOLLOW-UP (OUTPATIENT)
Dept: MEDICAL GROUP | Facility: IMAGING CENTER | Age: 70
End: 2025-03-05

## 2025-03-12 ENCOUNTER — OFFICE VISIT (OUTPATIENT)
Dept: MEDICAL GROUP | Facility: IMAGING CENTER | Age: 70
End: 2025-03-12
Payer: MEDICARE

## 2025-03-12 VITALS
HEART RATE: 102 BPM | DIASTOLIC BLOOD PRESSURE: 72 MMHG | SYSTOLIC BLOOD PRESSURE: 120 MMHG | OXYGEN SATURATION: 96 % | HEIGHT: 62 IN | BODY MASS INDEX: 24.07 KG/M2 | RESPIRATION RATE: 15 BRPM | WEIGHT: 130.8 LBS | TEMPERATURE: 97.8 F

## 2025-03-12 DIAGNOSIS — R73.09 ELEVATED HEMOGLOBIN A1C: ICD-10-CM

## 2025-03-12 DIAGNOSIS — E78.5 DYSLIPIDEMIA: ICD-10-CM

## 2025-03-12 DIAGNOSIS — I10 ESSENTIAL HYPERTENSION: ICD-10-CM

## 2025-03-12 DIAGNOSIS — D72.819 LEUKOPENIA, UNSPECIFIED TYPE: ICD-10-CM

## 2025-03-12 DIAGNOSIS — K12.1 ORAL INFLAMMATION: ICD-10-CM

## 2025-03-12 DIAGNOSIS — L65.9 HAIR LOSS: ICD-10-CM

## 2025-03-12 DIAGNOSIS — R22.1 NECK NODULE: ICD-10-CM

## 2025-03-12 PROCEDURE — 3074F SYST BP LT 130 MM HG: CPT | Performed by: FAMILY MEDICINE

## 2025-03-12 PROCEDURE — 3078F DIAST BP <80 MM HG: CPT | Performed by: FAMILY MEDICINE

## 2025-03-12 PROCEDURE — 99214 OFFICE O/P EST MOD 30 MIN: CPT | Performed by: FAMILY MEDICINE

## 2025-03-12 ASSESSMENT — FIBROSIS 4 INDEX: FIB4 SCORE: 1.83

## 2025-03-12 NOTE — PROGRESS NOTES
SUBJECTIVE:    Chief Complaint   Patient presents with    Follow-Up     Lab results 2/28/25    Mouth Lesions     Pt report bump front of mouth lower gum inside the mouth   X 1 mo    Other     Pt report knot on the side of the neck near the gland, on the left side x 2 weeks        HPI:     Funmilayo Egan is a 69 y.o. female here for lab review.     A1c 5.8%- eating well, but having some margaritas, etc.     Wbc low - mild.     Dentist notes inflammation under her teeth, inside lower jaw area.   Started anti- inflammatory medication yesterday.   Slight bump on left neck area noted past week. No sore throat.   No issues swallowing. Prior face lift, went under chin area.   Family had throat cancer.     Last few months, hair loss. No new medications.   Thyroid lab stable. Has held off on dying her hair.   Has tried some rogaine.     ROS:  No recent fevers or chills. No nausea or vomiting. No diarrhea. No chest pains or shortness of breath. No lower extremity edema.    Current Outpatient Medications on File Prior to Visit   Medication Sig Dispense Refill    sertraline (ZOLOFT) 50 MG Tab Take 1 Tablet by mouth every day. 90 Tablet 3    valACYclovir (VALTREX) 500 MG Tab Take 1 Tablet by mouth 2 times a day. 6 Tablet 3    atorvastatin (LIPITOR) 10 MG Tab Take 1 Tablet by mouth every evening. 100 Tablet 3    amLODIPine (NORVASC) 2.5 MG Tab Take 1 Tablet by mouth every day. 90 Tablet 3    raloxifene (EVISTA) 60 MG Tab TAKE 1 TABLET BY MOUTH EVERY DAY 90 Tablet 3    VITAMIN D PO Take  by mouth.      VITAMIN E PO Take  by mouth.      lidocaine (LIDODERM) 5 % Patch Place 1 Patch on the skin every 24 hours as needed (back pain). 10 Patch 3    polyethylene glycol/lytes (MIRALAX) 17 g Pack Take 17 g by mouth every day.      triamcinolone acetonide (KENALOG) 0.1 % Cream Apply 1 Application topically 2 times a day. 45 g 0    hydrocortisone rectal (ANUSOL-HC) 2.5% Cream Insert 1 Application into the rectum 2 times a day. 28 g 0     TURMERIC PO Take 2 Tablets by mouth every morning.      Cholecalciferol (VITAMIN D-3) 125 MCG (5000 UT) Tab Take 5,000 Units by mouth every 7 days. On Monday       No current facility-administered medications on file prior to visit.       Allergies   Allergen Reactions    Sulfa Drugs Rash and Unspecified     7/15/19 welting urticaria/rash after taking Bactrim     Penicillins Itching and Unspecified     Blisters       Patient Active Problem List    Diagnosis Date Noted    History of osteoporosis 02/03/2025    Osteopenia of lumbar spine 02/03/2025    Hiatal hernia 05/17/2024    History of colon polyps 05/16/2024    Chronic obstructive pulmonary disease, unspecified (HCC) 04/11/2024    Hypertension 04/11/2024    Gastro-esophageal reflux disease without esophagitis 04/11/2024    Atherosclerosis of aorta ([facility]) 03/13/2024    Post concussive syndrome 12/01/2023    Mild cognitive impairment 12/01/2023    Ophthalmoplegia 08/14/2023    Age-related nuclear cataract of both eyes 08/14/2023    Glaucoma suspect 08/14/2023    Concussion with no loss of consciousness 06/01/2023    Diplopia 06/01/2023    Intractable neck and back pain due to muscle spasms 12/28/2022    Bilateral foot pain 05/17/2022    Scalp irritation 05/17/2022    Gastroesophageal reflux disease without esophagitis 09/10/2021    Generalized abdominal pain 12/21/2020    Sue's esophagus without dysplasia 08/25/2020    Urge incontinence of urine 04/12/2019    Hair pulling 03/12/2019    Essential hypertension 03/12/2019    Age-related osteoporosis without current pathological fracture 03/12/2019    Dyslipidemia 03/12/2019    Benign neoplasm of ascending colon 06/26/2018    Benign neoplasm of cecum 06/26/2018       Past Medical History:   Diagnosis Date    Allergy     Arthritis     ASTHMA     remote hx    Bowel habit changes     Chest discomfort 04/2019    Coronary CT scan with LAD 11.4.     COPD     mild, not on meds for it    GERD (gastroesophageal reflux  "disease)     Hyperlipidemia     Hypertension     IBD (inflammatory bowel disease)     OSTEOPOROSIS     Osteoporosis 04/11/2024    Shortness of breath 05/2019    Echocardiogram with normal LV size, LVEF 65%.Trace MR. Mild TR. RVSP 30mmHg.    Traumatic head injury less than 3 months ago 12/29/2022         OBJECTIVE:   /72 (BP Location: Left arm, Patient Position: Sitting, BP Cuff Size: Adult)   Pulse (!) 102   Temp 36.6 °C (97.8 °F) (Temporal)   Resp 15   Ht 1.575 m (5' 2\")   Wt 59.3 kg (130 lb 12.8 oz)   SpO2 96%   BMI 23.92 kg/m²   General: Well-developed well-nourished female, no acute distress  Oropharynx: no enlarged tonsils, lower inner jaw region with slight inflammation  Neck: supple, left anterior neck small nodule palpable, no supraclavicular GOLD, no thyromegaly  Cardiovascular: regular rate and rhythm, no murmurs, gallops, rubs  Lungs: clear to auscultation bilaterally, no wheezes, crackles, or rhonchi  Abdomen: +bowel sounds, soft, nontender, nondistended, no rebound, no guarding, no hepatosplenomegaly  Extremities: no cyanosis, clubbing, edema  Skin: Warm and dry  Psych: appropriate mood and affect        ASSESSMENT/PLAN:    69 y.o.female       1. Elevated hemoglobin A1c -mild.   Modify diet, routine exercise recommended. Recommend healthy diet.   Repeat labs in 6 months. Monitor.  Comp Metabolic Panel    HEMOGLOBIN A1C      2. Leukopenia, unspecified type - mild, monitor in future.  CBC WITH DIFFERENTIAL      3. Dyslipidemia   Continue atorvastatin 10 mg daily.   Monitor labs.  Lipid Profile      4. Essential hypertension -stable, continue amlodipine 2.5 mg daily.        5. Neck nodule - left anterior cervical region.   Monitor. Follow up in 4-6 weeks. Monitor.        6. Hair loss   Reviewed consideration of topical therapy otc and length of trial needed. Consider biotin supplement. Monitor.          7. Oral inflammation - followed by dentist. Notes taking anti- inflammatory. Monitor.      "       Return in about 6 weeks (around 4/23/2025).    This medical record contains text that has been entered with the assistance of computer voice recognition and dictation software.  Therefore, it may contain unintended errors in text, spelling, punctuation, or grammar.

## 2025-04-21 ENCOUNTER — OFFICE VISIT (OUTPATIENT)
Dept: OPHTHALMOLOGY | Facility: MEDICAL CENTER | Age: 70
End: 2025-04-21
Payer: MEDICARE

## 2025-04-21 DIAGNOSIS — H35.363 RETINAL DRUSEN OF BOTH EYES: ICD-10-CM

## 2025-04-21 DIAGNOSIS — H52.213 IRREGULAR ASTIGMATISM OF BOTH EYES: ICD-10-CM

## 2025-04-21 DIAGNOSIS — H43.812 POSTERIOR VITREOUS DETACHMENT OF LEFT EYE: ICD-10-CM

## 2025-04-21 DIAGNOSIS — H25.13 AGE-RELATED NUCLEAR CATARACT OF BOTH EYES: ICD-10-CM

## 2025-04-21 DIAGNOSIS — H40.003 GLAUCOMA SUSPECT OF BOTH EYES: ICD-10-CM

## 2025-04-21 DIAGNOSIS — H49.9 OPHTHALMOPLEGIA: ICD-10-CM

## 2025-04-21 ASSESSMENT — REFRACTION_WEARINGRX
OS_SPHERE: +1.50
SPECS_TYPE: TRIFOCAL
OD_CYLINDER: +1.00
OS_SPHERE: +1.25
OS_ADD: +2.75
OS_CYLINDER: +0.50
OS_ADD: +3.00
OD_SPHERE: +1.25
OS_CYLINDER: SPHERE
OD_HBASE: OUT
OS_HBASE: OUT
OD_SPHERE: +1.25
OD_CYLINDER: SPHERE
SPECS_TYPE: TRIFOCAL
OD_ADD: +2.25
OD_ADD: +3.00
OS_AXIS: 035
OS_HPRISM: 4.0
OD_HPRISM: 4.0
OD_HBASE: OUT
OS_HBASE: OUT
OS_HPRISM: 4.0
OD_AXIS: 143
OD_HPRISM: 4.0

## 2025-04-21 ASSESSMENT — VISUAL ACUITY
METHOD: SNELLEN - LINEAR
OS_CC: J1
CORRECTION_TYPE: GLASSES
OD_CC: J1
OS_CC: 20/25
OD_CC: 20/30-2

## 2025-04-21 ASSESSMENT — EXTERNAL EXAM - LEFT EYE: OS_EXAM: NORMAL

## 2025-04-21 ASSESSMENT — REFRACTION_MANIFEST
OD_AXIS: 097
METHOD_AUTOREFRACTION: 1
OS_SPHERE: +0.50
OD_CYLINDER: +1.75
OS_CYLINDER: +1.25
OD_SPHERE: -0.25
OS_AXIS: 050

## 2025-04-21 ASSESSMENT — EXTERNAL EXAM - RIGHT EYE: OD_EXAM: NORMAL

## 2025-04-21 ASSESSMENT — REFRACTION_FINALRX
OD_HBASE: OUT
OS_HPRISM: 4.0
OS_HBASE: OUT
OD_HPRISM: 4.0

## 2025-04-21 ASSESSMENT — ENCOUNTER SYMPTOMS: DOUBLE VISION: 1

## 2025-04-21 ASSESSMENT — TONOMETRY
OD_IOP_MMHG: 13
OS_IOP_MMHG: 13

## 2025-04-21 ASSESSMENT — CUP TO DISC RATIO
OD_RATIO: 0.0
OS_RATIO: 0.0

## 2025-04-21 ASSESSMENT — SLIT LAMP EXAM - LIDS
COMMENTS: 1+ PTOSIS
COMMENTS: PTOSIS

## 2025-04-21 NOTE — ASSESSMENT & PLAN NOTE
8/14/2023-complaint of the relatively cute onset of double vision especially for faraway after a concussion in December 2022 versus she slipped on some ice fell back and hit her head.  On examination her extraocular motility is full although she does have a 8 diopter intermittent esotropia that can build.  I did review the MRI scan done at [facility] dated 12/20/2022 which showed possible asymmetric thickening of the right medial rectus muscle.  Therefore this is the breakdown of an underlying strabismus esotropia, versus difficulty compensating for a possible early thyroid orbitopathy, versus myasthenia.  There was some variability in her measurement and she seemed to tolerate a 8 diopter base out prism which I placed over the left lens.  In addition I suggested she obtain thyroid antibodies as well as TSH receptor antibody and a myasthenia gravis panel.  8/21/2023 - Ach receptor antibodies negative, TSH receptor and thyroglobulin ab negative  10/12/2020-did well with 8 diopter base out press on prism over the left eye.  We will give Rx ground in 4 base out OU  4/17/2024 -overall doing excellent with Rx for base out ground in prism OU.  No progression  4/21/2025-overall stable.  No progressive.  Doing well with 4 base out ground in prism OU

## 2025-04-21 NOTE — ASSESSMENT & PLAN NOTE
4/21/2025-some progressive oblique astigmatism.  Most likely secondary to early cataracts.  Was able to improve acuity by adjusting glasses Rx.  New glasses Rx given

## 2025-04-21 NOTE — PROGRESS NOTES
Peds/Neuro Ophthalmology:   Michael Hugo M.D.    Date & Time note created:    4/21/2025   11:27 AM     Referring MD / APRN:  Samina Mera M.D., No att. providers found    Patient ID:  Name:             Funmilayo Egan   YOB: 1955  Age:                 69 y.o.  female   MRN:               [id number]    Chief Complaint/Reason for Visit:     Other (ophthalmoplegia)      History of Present Illness:    Funmilayo Egan is a 69 y.o. female   Follow up Ophthalmoplegia.Some slight double vision with prism glasses but much better than the past.    Other        Review of Systems:  Review of Systems   Eyes:  Positive for double vision.   All other systems reviewed and are negative.      Past Medical History:   Past Medical History:   Diagnosis Date    Allergy     Arthritis     ASTHMA     remote hx    Bowel habit changes     Chest discomfort 04/2019    Coronary CT scan with LAD 11.4.     COPD     mild, not on meds for it    GERD (gastroesophageal reflux disease)     Hyperlipidemia     Hypertension     IBD (inflammatory bowel disease)     OSTEOPOROSIS     Osteoporosis 04/11/2024    Shortness of breath 05/2019    Echocardiogram with normal LV size, LVEF 65%.Trace MR. Mild TR. RVSP 30mmHg.    Traumatic head injury less than 3 months ago 12/29/2022       Past Surgical History:  Past Surgical History:   Procedure Laterality Date    HEMORRHOIDECTOMY N/A 4/19/2021    Procedure: HEMORRHOIDECTOMY - INTERNAL AND EXTERNAL, COMPLEX OR EXTENSIVE.;  Surgeon: Estela Kenney M.D.;  Location: SURGERY [facility];  Service: General    OTHER  2020    Esophagus    ABDOMINAL HYSTERECTOMY TOTAL      APPENDECTOMY      HEMORRHOIDECTOMY         Current Outpatient Medications:  Current Outpatient Medications   Medication Sig Dispense Refill    sertraline (ZOLOFT) 50 MG Tab Take 1 Tablet by mouth every day. 90 Tablet 3    valACYclovir (VALTREX) 500 MG Tab Take 1 Tablet by mouth 2 times a day. 6 Tablet 3    atorvastatin  (LIPITOR) 10 MG Tab Take 1 Tablet by mouth every evening. 100 Tablet 3    amLODIPine (NORVASC) 2.5 MG Tab Take 1 Tablet by mouth every day. 90 Tablet 3    raloxifene (EVISTA) 60 MG Tab TAKE 1 TABLET BY MOUTH EVERY DAY 90 Tablet 3    VITAMIN D PO Take  by mouth.      VITAMIN E PO Take  by mouth.      lidocaine (LIDODERM) 5 % Patch Place 1 Patch on the skin every 24 hours as needed (back pain). 10 Patch 3    polyethylene glycol/lytes (MIRALAX) 17 g Pack Take 17 g by mouth every day.      triamcinolone acetonide (KENALOG) 0.1 % Cream Apply 1 Application topically 2 times a day. 45 g 0    hydrocortisone rectal (ANUSOL-HC) 2.5% Cream Insert 1 Application into the rectum 2 times a day. 28 g 0    TURMERIC PO Take 2 Tablets by mouth every morning.      Cholecalciferol (VITAMIN D-3) 125 MCG (5000 UT) Tab Take 5,000 Units by mouth every 7 days. On Monday       No current facility-administered medications for this visit.       Allergies:  Allergies   Allergen Reactions    Sulfa Drugs Rash and Unspecified     7/15/19 welting urticaria/rash after taking Bactrim     Penicillins Itching and Unspecified     Blisters       Family History:  Family History   Problem Relation Age of Onset    Heart Disease Mother 65    Cancer Father     Heart Disease Brother     Dementia Sister     Stroke Sister     Heart Disease Brother     Hypertension Sister     Hypertension Sister     Hyperlipidemia Sister     Stroke Sister     Glaucoma Maternal Uncle        Social History:  Social History     Socioeconomic History    Marital status:      Spouse name: Not on file    Number of children: Not on file    Years of education: Not on file    Highest education level: Bachelor's degree (e.g., BA, AB, BS)   Occupational History    Not on file   Tobacco Use    Smoking status: Former     Types: Cigarettes    Smokeless tobacco: Never    Tobacco comments:     20 y.a   Vaping Use    Vaping status: Never Used   Substance and Sexual Activity    Alcohol use:  Not Currently     Comment: stopped in 2020    Drug use: Not Currently     Frequency: 5.0 times per week     Types: Marijuana, Oral     Comment: In evening before bed to sleep    Sexual activity: Yes     Partners: Male   Other Topics Concern    Not on file   Social History Narrative    Retired     Social Drivers of Health     Financial Resource Strain: Low Risk  (1/22/2024)    Overall Financial Resource Strain (CARDIA)     Difficulty of Paying Living Expenses: Not hard at all   Food Insecurity: No Food Insecurity (1/22/2024)    Hunger Vital Sign     Worried About Running Out of Food in the Last Year: Never true     Ran Out of Food in the Last Year: Never true   Transportation Needs: No Transportation Needs (1/22/2024)    PRAPARE - Transportation     Lack of Transportation (Medical): No     Lack of Transportation (Non-Medical): No   Physical Activity: Insufficiently Active (1/22/2024)    Exercise Vital Sign     Days of Exercise per Week: 3 days     Minutes of Exercise per Session: 30 min   Stress: No Stress Concern Present (1/22/2024)    [employer] [place] [employer] - Occupational Stress Questionnaire     Feeling of Stress : Not at all   Social Connections: Moderately Isolated (1/22/2024)    Social Connection and Isolation Panel [NHANES]     Frequency of Communication with Friends and Family: More than three times a week     Frequency of Social Gatherings with Friends and Family: Once a week     Attends Shinto Services: Never     Active Member of Clubs or Organizations: No     Attends Club or Organization Meetings: Never     Marital Status:    Intimate Partner Violence: Not on file   Housing Stability: Low Risk  (1/22/2024)    Housing Stability Vital Sign     Unable to Pay for Housing in the Last Year: No     Number of Places Lived in the Last Year: 1     Unstable Housing in the Last Year: No          Physical Exam:  Physical Exam    Oriented x 3  Weight/BMI: There is no height or weight on file  to calculate BMI.  There were no vitals taken for this visit.    Base Eye Exam       Visual Acuity (Snellen - Linear)         Right Left    Dist cc 20/30-2 20/25    Near cc J1 J1      Correction: Glasses              Tonometry (i care, 10:55 AM)         Right Left    Pressure 13 13              Pupils         Pupils    Right PERRL    Left PERRL              Extraocular Movement         Right Left     Full Full              Neuro/Psych       Oriented x3: Yes    Mood/Affect: Normal                  Additional Tests       Stereo       Fly: +    Animals: 3/3    Circles: 5/9                  Strabismus Exam         0 0 0   0 0 0                       0  0  E(T) 8 0  0                       0 0 0   0 0 0                       Slit Lamp and Fundus Exam       External Exam         Right Left    External Normal Normal              Slit Lamp Exam         Right Left    Lids/Lashes 1+ Ptosis Ptosis    Conjunctiva/Sclera White and quiet White and quiet    Cornea Clear Clear    Anterior Chamber Deep and quiet Deep and quiet    Iris Round and reactive Round and reactive    Lens Nuclear sclerosis Nuclear sclerosis    Vitreous Normal Normal              Fundus Exam         Right Left    Disc Normal Normal    C/D Ratio 0.0 0.0    Macula Normal Normal    Vessels Normal Normal    Periphery Normal Normal                  Refraction       Wearing Rx         Sphere Cylinder Axis Add Horz Prism    Right +1.25 +1.00 143 +2.25 4.0 out    Left +1.50 +0.50 035 +2.75 4.0 out      Type: Trifocal              Wearing Rx #2         Sphere Cylinder Axis Add Horz Prism    Right +1.25 Sphere  +3.00 4.0 out    Left +1.25 Sphere  +3.00 4.0 out      Type: Trifocal              Manifest Refraction (Auto)         Sphere Cylinder Axis    Right -0.25 +1.75 097    Left +0.50 +1.25 050              Final Rx         Sphere Cylinder Axis Add Horz Prism    Right [place] +1.50 090 +3.00 4.0 out    Left +0.75 +0.50 050 +3.00 4.0 out      Type: Trifocal                     Pertinent Lab/Test/Imaging Review:      Assessment and Plan:     Age-related nuclear cataract of both eyes  8/14/2023-early nuclear sclerotic cataracts.  Not visually significant at this time  10/12/2023-stable  4/17/2024-overall stable no progression  4/21/2025-slight progression, however able to manifest improvement    Glaucoma suspect  8/14/2023-crowded optic nerve heads, no significant cupping, no evidence of pallor from the concussion  10/12/2023 -IOP stable  4/21/2025-IOP stable.  No progressive cupping.  Average NFL thickness 88 OD 86 OS    Ophthalmoplegia  8/14/2023-complaint of the relatively cute onset of double vision especially for faraway after a concussion in December 2022 versus she slipped on some ice fell back and hit her head.  On examination her extraocular motility is full although she does have a 8 diopter intermittent esotropia that can build.  I did review the MRI scan done at [facility] dated 12/20/2022 which showed possible asymmetric thickening of the right medial rectus muscle.  Therefore this is the breakdown of an underlying strabismus esotropia, versus difficulty compensating for a possible early thyroid orbitopathy, versus myasthenia.  There was some variability in her measurement and she seemed to tolerate a 8 diopter base out prism which I placed over the left lens.  In addition I suggested she obtain thyroid antibodies as well as TSH receptor antibody and a myasthenia gravis panel.  8/21/2023 - Ach receptor antibodies negative, TSH receptor and thyroglobulin ab negative  10/12/2020-did well with 8 diopter base out press on prism over the left eye.  We will give Rx ground in 4 base out OU  4/17/2024 -overall doing excellent with Rx for base out ground in prism OU.  No progression  4/21/2025-overall stable.  No progressive.  Doing well with 4 base out ground in prism OU    Posterior vitreous detachment of left eye  4/21/2025-stable PVD OS    Retinal drusen of both  eyes  4/21/2025-stable peripheral drusen    Irregular astigmatism of both eyes  4/21/2025-some progressive oblique astigmatism.  Most likely secondary to early cataracts.  Was able to improve acuity by adjusting glasses Rx.  New glasses Rx given        Michael Hugo M.D.

## 2025-04-21 NOTE — ASSESSMENT & PLAN NOTE
8/14/2023-early nuclear sclerotic cataracts.  Not visually significant at this time  10/12/2023-stable  4/17/2024-overall stable no progression  4/21/2025-slight progression, however able to manifest improvement

## 2025-04-21 NOTE — ASSESSMENT & PLAN NOTE
8/14/2023-crowded optic nerve heads, no significant cupping, no evidence of pallor from the concussion  10/12/2023 -IOP stable  4/21/2025-IOP stable.  No progressive cupping.  Average NFL thickness 88 OD 86 OS

## 2025-04-26 ENCOUNTER — HOSPITAL ENCOUNTER (OUTPATIENT)
Facility: MEDICAL CENTER | Age: 70
End: 2025-04-26
Attending: FAMILY MEDICINE
Payer: MEDICARE

## 2025-04-26 DIAGNOSIS — E78.5 DYSLIPIDEMIA: ICD-10-CM

## 2025-04-26 DIAGNOSIS — D72.819 LEUKOPENIA, UNSPECIFIED TYPE: ICD-10-CM

## 2025-04-26 DIAGNOSIS — R73.09 ELEVATED HEMOGLOBIN A1C: ICD-10-CM

## 2025-04-26 LAB
ALBUMIN SERPL BCP-MCNC: 4.2 G/DL (ref 3.2–4.9)
ALBUMIN/GLOB SERPL: 2 G/DL
ALP SERPL-CCNC: 46 U/L (ref 30–99)
ALT SERPL-CCNC: 35 U/L (ref 2–50)
ANION GAP SERPL CALC-SCNC: 10 MMOL/L (ref 7–16)
AST SERPL-CCNC: 24 U/L (ref 12–45)
BASOPHILS # BLD AUTO: 0.2 % (ref 0–1.8)
BASOPHILS # BLD: 0.01 K/UL (ref 0–0.12)
BILIRUB SERPL-MCNC: 0.4 MG/DL (ref 0.1–1.5)
BUN SERPL-MCNC: 16 MG/DL (ref 8–22)
CALCIUM ALBUM COR SERPL-MCNC: 9.2 MG/DL (ref 8.5–10.5)
CALCIUM SERPL-MCNC: 9.4 MG/DL (ref 8.5–10.5)
CHLORIDE SERPL-SCNC: 104 MMOL/L (ref 96–112)
CHOLEST SERPL-MCNC: 163 MG/DL (ref 100–199)
CO2 SERPL-SCNC: 27 MMOL/L (ref 20–33)
CREAT SERPL-MCNC: 0.8 MG/DL (ref 0.5–1.4)
EOSINOPHIL # BLD AUTO: 0.19 K/UL (ref 0–0.51)
EOSINOPHIL NFR BLD: 4.6 % (ref 0–6.9)
ERYTHROCYTE [DISTWIDTH] IN BLOOD BY AUTOMATED COUNT: 46.4 FL (ref 35.9–50)
EST. AVERAGE GLUCOSE BLD GHB EST-MCNC: 117 MG/DL
FASTING STATUS PATIENT QL REPORTED: NORMAL
GFR SERPLBLD CREATININE-BSD FMLA CKD-EPI: 79 ML/MIN/1.73 M 2
GLOBULIN SER CALC-MCNC: 2.1 G/DL (ref 1.9–3.5)
GLUCOSE SERPL-MCNC: 96 MG/DL (ref 65–99)
HBA1C MFR BLD: 5.7 % (ref 4–5.6)
HCT VFR BLD AUTO: 43.7 % (ref 37–47)
HDLC SERPL-MCNC: 65 MG/DL
HGB BLD-MCNC: 14.6 G/DL (ref 12–16)
IMM GRANULOCYTES # BLD AUTO: 0.01 K/UL (ref 0–0.11)
IMM GRANULOCYTES NFR BLD AUTO: 0.2 % (ref 0–0.9)
LDLC SERPL CALC-MCNC: 84 MG/DL
LYMPHOCYTES # BLD AUTO: 1.16 K/UL (ref 1–4.8)
LYMPHOCYTES NFR BLD: 28.1 % (ref 22–41)
MCH RBC QN AUTO: 31.3 PG (ref 27–33)
MCHC RBC AUTO-ENTMCNC: 33.4 G/DL (ref 32.2–35.5)
MCV RBC AUTO: 93.6 FL (ref 81.4–97.8)
MONOCYTES # BLD AUTO: 0.4 K/UL (ref 0–0.85)
MONOCYTES NFR BLD AUTO: 9.7 % (ref 0–13.4)
NEUTROPHILS # BLD AUTO: 2.36 K/UL (ref 1.82–7.42)
NEUTROPHILS NFR BLD: 57.2 % (ref 44–72)
NRBC # BLD AUTO: 0 K/UL
NRBC BLD-RTO: 0 /100 WBC (ref 0–0.2)
PLATELET # BLD AUTO: 186 K/UL (ref 164–446)
PMV BLD AUTO: 10.3 FL (ref 9–12.9)
POTASSIUM SERPL-SCNC: 4.6 MMOL/L (ref 3.6–5.5)
PROT SERPL-MCNC: 6.3 G/DL (ref 6–8.2)
RBC # BLD AUTO: 4.67 M/UL (ref 4.2–5.4)
SODIUM SERPL-SCNC: 141 MMOL/L (ref 135–145)
TRIGL SERPL-MCNC: 71 MG/DL (ref 0–149)
WBC # BLD AUTO: 4.1 K/UL (ref 4.8–10.8)

## 2025-04-26 PROCEDURE — 85025 COMPLETE CBC W/AUTO DIFF WBC: CPT

## 2025-04-26 PROCEDURE — 80061 LIPID PANEL: CPT

## 2025-04-26 PROCEDURE — 83036 HEMOGLOBIN GLYCOSYLATED A1C: CPT

## 2025-04-26 PROCEDURE — 36415 COLL VENOUS BLD VENIPUNCTURE: CPT

## 2025-04-26 PROCEDURE — 80053 COMPREHEN METABOLIC PANEL: CPT

## 2025-04-29 ENCOUNTER — RESULTS FOLLOW-UP (OUTPATIENT)
Dept: MEDICAL GROUP | Facility: IMAGING CENTER | Age: 70
End: 2025-04-29

## 2025-04-30 ENCOUNTER — OFFICE VISIT (OUTPATIENT)
Dept: MEDICAL GROUP | Facility: IMAGING CENTER | Age: 70
End: 2025-04-30
Payer: MEDICARE

## 2025-04-30 VITALS
WEIGHT: 132.6 LBS | HEIGHT: 62 IN | HEART RATE: 82 BPM | TEMPERATURE: 98.1 F | RESPIRATION RATE: 16 BRPM | DIASTOLIC BLOOD PRESSURE: 80 MMHG | BODY MASS INDEX: 24.4 KG/M2 | OXYGEN SATURATION: 93 % | SYSTOLIC BLOOD PRESSURE: 132 MMHG

## 2025-04-30 DIAGNOSIS — D72.819 LEUKOPENIA, UNSPECIFIED TYPE: ICD-10-CM

## 2025-04-30 DIAGNOSIS — E78.5 DYSLIPIDEMIA: ICD-10-CM

## 2025-04-30 DIAGNOSIS — R42 DIZZINESS: ICD-10-CM

## 2025-04-30 DIAGNOSIS — R73.09 ELEVATED HEMOGLOBIN A1C: ICD-10-CM

## 2025-04-30 DIAGNOSIS — F41.9 ANXIETY: ICD-10-CM

## 2025-04-30 DIAGNOSIS — R22.1 NECK NODULE: ICD-10-CM

## 2025-04-30 DIAGNOSIS — K05.10 GUM INFLAMMATION: ICD-10-CM

## 2025-04-30 DIAGNOSIS — I10 ESSENTIAL HYPERTENSION: ICD-10-CM

## 2025-04-30 DIAGNOSIS — R53.83 DECREASED ENERGY: ICD-10-CM

## 2025-04-30 PROCEDURE — 93000 ELECTROCARDIOGRAM COMPLETE: CPT | Performed by: FAMILY MEDICINE

## 2025-04-30 PROCEDURE — 99214 OFFICE O/P EST MOD 30 MIN: CPT | Mod: 25 | Performed by: FAMILY MEDICINE

## 2025-04-30 PROCEDURE — 3078F DIAST BP <80 MM HG: CPT | Performed by: FAMILY MEDICINE

## 2025-04-30 PROCEDURE — 3074F SYST BP LT 130 MM HG: CPT | Performed by: FAMILY MEDICINE

## 2025-04-30 RX ORDER — FLUTICASONE PROPIONATE 50 MCG
2 SPRAY, SUSPENSION (ML) NASAL DAILY
Qty: 16 G | Refills: 1 | Status: SHIPPED | OUTPATIENT
Start: 2025-04-30 | End: 2025-05-27

## 2025-04-30 RX ORDER — RALOXIFENE HYDROCHLORIDE 60 MG/1
1 TABLET, FILM COATED ORAL
COMMUNITY

## 2025-04-30 ASSESSMENT — ANXIETY QUESTIONNAIRES
7. FEELING AFRAID AS IF SOMETHING AWFUL MIGHT HAPPEN: NEARLY EVERY DAY
1. FEELING NERVOUS, ANXIOUS, OR ON EDGE: NEARLY EVERY DAY
4. TROUBLE RELAXING: MORE THAN HALF THE DAYS
5. BEING SO RESTLESS THAT IT IS HARD TO SIT STILL: NOT AT ALL
2. NOT BEING ABLE TO STOP OR CONTROL WORRYING: NEARLY EVERY DAY
6. BECOMING EASILY ANNOYED OR IRRITABLE: NEARLY EVERY DAY
3. WORRYING TOO MUCH ABOUT DIFFERENT THINGS: NEARLY EVERY DAY
GAD7 TOTAL SCORE: 17

## 2025-04-30 ASSESSMENT — FIBROSIS 4 INDEX: FIB4 SCORE: 1.5

## 2025-04-30 NOTE — PROGRESS NOTES
SUBJECTIVE:    Chief Complaint   Patient presents with    Follow-Up     Lab results 4/26/25     Other     X 2 weeks low energy, pt report weak and fainting, dizziness   Monday she check her BP and it was ok        HPI:     Funmilayo Egan is a 69 y.o. female here for review of lab work.  Notes the last 2 weeks she has had no energy.  Had noted some left lower extremity soreness and pain.  Feels she has dizziness the past week or so.  Before that felt more weak.  Wondering if her diet is contributing as she is limiting certain foods due to her GI issues.  Wondering if feeling weak due to diet. No carbs. Cauliflower/broccoli.   Milk cheese. Chicken/fish- 2 times a week. A little better with diet changes.   When walk or standing notes dizziness.   May feel light headed. Feels weak in morning.  Hx of some allergies, nose runs.  Not on her medication.  Feels the room moving some, feels might spin.   Might have some nausea.    Lower gum inflammation- saw dentist.  Had symptoms about a month ago.  Saw specialist who does root canals.  Given antibiotics, took intermittently.   Got better somewhat, still feels area if she touches it.     Has had more anxiety lately as she is getting ready to move.  Possibly may move at the end of June.  However the process of moving has made her more anxious.  She is currently on Zoloft 50 mg daily.    ROS:  No recent fevers or chills. No vomiting. No diarrhea. No chest pains or shortness of breath. No lower extremity edema. No urinary symptoms.  No other palpitations.    Current Outpatient Medications on File Prior to Visit   Medication Sig Dispense Refill    raloxifene (EVISTA) 60 MG Tab Take 1 Tablet by mouth every day.      sertraline (ZOLOFT) 50 MG Tab Take 1 Tablet by mouth every day. 90 Tablet 3    valACYclovir (VALTREX) 500 MG Tab Take 1 Tablet by mouth 2 times a day. 6 Tablet 3    atorvastatin (LIPITOR) 10 MG Tab Take 1 Tablet by mouth every evening. 100 Tablet 3    amLODIPine  (NORVASC) 2.5 MG Tab Take 1 Tablet by mouth every day. 90 Tablet 3    raloxifene (EVISTA) 60 MG Tab TAKE 1 TABLET BY MOUTH EVERY DAY 90 Tablet 3    VITAMIN D PO Take  by mouth.      VITAMIN E PO Take  by mouth.      lidocaine (LIDODERM) 5 % Patch Place 1 Patch on the skin every 24 hours as needed (back pain). 10 Patch 3    polyethylene glycol/lytes (MIRALAX) 17 g Pack Take 17 g by mouth every day.      triamcinolone acetonide (KENALOG) 0.1 % Cream Apply 1 Application topically 2 times a day. 45 g 0    hydrocortisone rectal (ANUSOL-HC) 2.5% Cream Insert 1 Application into the rectum 2 times a day. 28 g 0    TURMERIC PO Take 2 Tablets by mouth every morning.      Cholecalciferol (VITAMIN D-3) 125 MCG (5000 UT) Tab Take 5,000 Units by mouth every 7 days. On Monday       No current facility-administered medications on file prior to visit.       Allergies   Allergen Reactions    Sulfa Drugs Rash and Unspecified     7/15/19 welting urticaria/rash after taking Bactrim     Penicillins Itching and Unspecified     Blisters       Patient Active Problem List    Diagnosis Date Noted    Posterior vitreous detachment of left eye 04/21/2025    Retinal drusen of both eyes 04/21/2025    Irregular astigmatism of both eyes 04/21/2025    History of osteoporosis 02/03/2025    Osteopenia of lumbar spine 02/03/2025    Hiatal hernia 05/17/2024    History of colon polyps 05/16/2024    Chronic obstructive pulmonary disease, unspecified (HCC) 04/11/2024    Hypertension 04/11/2024    Gastro-esophageal reflux disease without esophagitis 04/11/2024    Atherosclerosis of aorta (HCC) 03/13/2024    Post concussive syndrome 12/01/2023    Mild cognitive impairment 12/01/2023    Ophthalmoplegia 08/14/2023    Age-related nuclear cataract of both eyes 08/14/2023    Glaucoma suspect 08/14/2023    Concussion with no loss of consciousness 06/01/2023    Diplopia 06/01/2023    Intractable neck and back pain due to muscle spasms 12/28/2022    Bilateral foot  "pain 05/17/2022    Scalp irritation 05/17/2022    Gastroesophageal reflux disease without esophagitis 09/10/2021    Generalized abdominal pain 12/21/2020    Sue's esophagus without dysplasia 08/25/2020    Urge incontinence of urine 04/12/2019    Hair pulling 03/12/2019    Essential hypertension 03/12/2019    Age-related osteoporosis without current pathological fracture 03/12/2019    Dyslipidemia 03/12/2019    Benign neoplasm of ascending colon 06/26/2018    Benign neoplasm of cecum 06/26/2018       Past Medical History:   Diagnosis Date    Allergy     Arthritis     ASTHMA     remote hx    Bowel habit changes     Chest discomfort 04/2019    Coronary CT scan with LAD 11.4.     COPD     mild, not on meds for it    GERD (gastroesophageal reflux disease)     Hyperlipidemia     Hypertension     IBD (inflammatory bowel disease)     OSTEOPOROSIS     Osteoporosis 04/11/2024    Shortness of breath 05/2019    Echocardiogram with normal LV size, LVEF 65%.Trace MR. Mild TR. RVSP 30mmHg.    Traumatic head injury less than 3 months ago 12/29/2022         OBJECTIVE:   /78 (BP Location: Left arm, Patient Position: Sitting, BP Cuff Size: Adult)   Pulse 82   Temp 36.7 °C (98.1 °F) (Temporal)   Resp 16   Ht 1.575 m (5' 2\")   Wt 60.1 kg (132 lb 9.6 oz)   SpO2 97%   BMI 24.25 kg/m²   General: Well-developed well-nourished female, no acute distress  HEENT: oropharynx clear- lower gum line appears normal, eyes clear, TMs clear and intact, perrl, eomi  Neck: supple, no lymphadenopathy- cervical or supraclavicular, no thyromegaly. Left superior anterior neck region with small nodule. ***  Cardiovascular: regular rate and rhythm, no murmurs, gallops, rubs  Lungs: clear to auscultation bilaterally, no wheezes, crackles, or rhonchi  Abdomen: +bowel sounds, soft, nontender, nondistended, no rebound, no guarding, no hepatosplenomegaly  Extremities: no cyanosis, clubbing, edema  Skin: Warm and dry  Psych: appropriate mood and " affect  Neuro: 2+ DTR throughout, 5/5 strength throughout.  Positive Romberg.     Negative obed hallpike maneuver.        Latest Reference Range & Units 04/26/25 11:50   WBC 4.8 - 10.8 K/uL 4.1 (L)   RBC 4.20 - 5.40 M/uL 4.67   Hemoglobin 12.0 - 16.0 g/dL 14.6   Hematocrit 37.0 - 47.0 % 43.7   MCV 81.4 - 97.8 fL 93.6   MCH 27.0 - 33.0 pg 31.3   MCHC 32.2 - 35.5 g/dL 33.4   RDW 35.9 - 50.0 fL 46.4   Platelet Count 164 - 446 K/uL 186   MPV 9.0 - 12.9 fL 10.3   Neutrophils-Polys 44.00 - 72.00 % 57.20   Neutrophils (Absolute) 1.82 - 7.42 K/uL 2.36   Lymphocytes 22.00 - 41.00 % 28.10   Lymphs (Absolute) 1.00 - 4.80 K/uL 1.16   Monocytes 0.00 - 13.40 % 9.70   Monos (Absolute) 0.00 - 0.85 K/uL 0.40   Eosinophils 0.00 - 6.90 % 4.60   Eos (Absolute) 0.00 - 0.51 K/uL 0.19   Basophils 0.00 - 1.80 % 0.20   Baso (Absolute) 0.00 - 0.12 K/uL 0.01   Immature Granulocytes 0.00 - 0.90 % 0.20   Immature Granulocytes (abs) 0.00 - 0.11 K/uL 0.01   Nucleated RBC 0.00 - 0.20 /100 WBC 0.00   NRBC (Absolute) K/uL 0.00   Sodium 135 - 145 mmol/L 141   Potassium 3.6 - 5.5 mmol/L 4.6   Chloride 96 - 112 mmol/L 104   Co2 20 - 33 mmol/L 27   Anion Gap 7.0 - 16.0  10.0   Glucose 65 - 99 mg/dL 96   Bun 8 - 22 mg/dL 16   Creatinine 0.50 - 1.40 mg/dL 0.80   GFR (CKD-EPI) >60 mL/min/1.73 m 2 79   Calcium 8.5 - 10.5 mg/dL 9.4   Correct Calcium 8.5 - 10.5 mg/dL 9.2   AST(SGOT) 12 - 45 U/L 24   ALT(SGPT) 2 - 50 U/L 35   Alkaline Phosphatase 30 - 99 U/L 46   Total Bilirubin 0.1 - 1.5 mg/dL 0.4   Albumin 3.2 - 4.9 g/dL 4.2   Total Protein 6.0 - 8.2 g/dL 6.3   Globulin 1.9 - 3.5 g/dL 2.1   A-G Ratio g/dL 2.0   Glycohemoglobin 4.0 - 5.6 % 5.7 (H)   Estim. Avg Glu mg/dL 117   Fasting Status  Fasting   Cholesterol,Tot 100 - 199 mg/dL 163   Triglycerides 0 - 149 mg/dL 71   HDL >=40 mg/dL 65   LDL <100 mg/dL 84   (L): Data is abnormally low  (H): Data is abnormally high      EKG: HR 75, borderline prolonged     Orthostatic BP HR.   Lying 122/70, HR  81  Siting 132/78, HR 81  Standing 132/80, R 82      ASSESSMENT/PLAN:    69 y.o.female     1. Dizziness - EKG, orthostatic BP and HR overall stable. Labs stable. Consider viral etiology-vestibular neuritis.   Start flonase therapy.   Monitor. Follow up sooner as needed.  EKG    fluticasone (FLONASE) 50 MCG/ACT nasal spray      2. Decreased energy        3. Elevated hemoglobin A1c        4. Leukopenia, unspecified type        5. Dyslipidemia        6. Essential hypertension        7. Gum inflammation - improvement noted.   Recommend if continue continued or recurrent symptoms, follow up with oral specialist.        8. Anxiety -increased to due stressors.   Recommend increase zoloft 50 mg to 1.5 tabs daily.              Return in about 1 month (around 5/30/2025).    This medical record contains text that has been entered with the assistance of computer voice recognition and dictation software.  Therefore, it may contain unintended errors in text, spelling, punctuation, or grammar.

## 2025-05-28 ENCOUNTER — APPOINTMENT (OUTPATIENT)
Dept: MEDICAL GROUP | Facility: IMAGING CENTER | Age: 70
End: 2025-05-28
Payer: MEDICARE

## 2025-07-16 ENCOUNTER — APPOINTMENT (OUTPATIENT)
Dept: MEDICAL GROUP | Facility: IMAGING CENTER | Age: 70
End: 2025-07-16
Payer: MEDICARE

## 2025-07-16 VITALS
OXYGEN SATURATION: 97 % | SYSTOLIC BLOOD PRESSURE: 118 MMHG | BODY MASS INDEX: 24.48 KG/M2 | WEIGHT: 133 LBS | RESPIRATION RATE: 15 BRPM | TEMPERATURE: 97.5 F | DIASTOLIC BLOOD PRESSURE: 72 MMHG | HEART RATE: 85 BPM | HEIGHT: 62 IN

## 2025-07-16 DIAGNOSIS — I10 ESSENTIAL HYPERTENSION: ICD-10-CM

## 2025-07-16 DIAGNOSIS — B00.1 HERPES LABIALIS: ICD-10-CM

## 2025-07-16 DIAGNOSIS — R22.1 NECK NODULE: ICD-10-CM

## 2025-07-16 DIAGNOSIS — R42 DIZZINESS: ICD-10-CM

## 2025-07-16 DIAGNOSIS — Z12.31 ENCOUNTER FOR SCREENING MAMMOGRAM FOR MALIGNANT NEOPLASM OF BREAST: ICD-10-CM

## 2025-07-16 DIAGNOSIS — K05.10 GUM INFLAMMATION: ICD-10-CM

## 2025-07-16 DIAGNOSIS — R53.83 DECREASED ENERGY: ICD-10-CM

## 2025-07-16 DIAGNOSIS — D72.819 LEUKOPENIA, UNSPECIFIED TYPE: ICD-10-CM

## 2025-07-16 DIAGNOSIS — R73.09 ELEVATED HEMOGLOBIN A1C: ICD-10-CM

## 2025-07-16 DIAGNOSIS — E78.5 DYSLIPIDEMIA: ICD-10-CM

## 2025-07-16 DIAGNOSIS — A60.00 GENITAL HERPES SIMPLEX, UNSPECIFIED SITE: ICD-10-CM

## 2025-07-16 PROCEDURE — 3078F DIAST BP <80 MM HG: CPT | Performed by: FAMILY MEDICINE

## 2025-07-16 PROCEDURE — 99214 OFFICE O/P EST MOD 30 MIN: CPT | Performed by: FAMILY MEDICINE

## 2025-07-16 PROCEDURE — 3074F SYST BP LT 130 MM HG: CPT | Performed by: FAMILY MEDICINE

## 2025-07-16 RX ORDER — VALACYCLOVIR HYDROCHLORIDE 500 MG/1
500 TABLET, FILM COATED ORAL 2 TIMES DAILY
Qty: 6 TABLET | Refills: 3 | Status: SHIPPED | OUTPATIENT
Start: 2025-07-16

## 2025-07-16 ASSESSMENT — FIBROSIS 4 INDEX: FIB4 SCORE: 1.5

## 2025-07-16 NOTE — PROGRESS NOTES
SUBJECTIVE:    Chief Complaint   Patient presents with    Follow-Up     Pt report doing wonderful     Other     Consult on annual pulmonary function test        HPI:     Funmilayo Egan is a 69 y.o. female here for prior symptoms of dizziness and decreased energy.  Notes her symptoms have improved.  Currently without significant issues.  Drinking mushroom coffee which she feels has helped.  Neck nodule better.  Thus, neck u/s not done.   She will be seeing the Specialist next week- gum inflammation/infection.  Pulmonary function test ordered last year was not done, feeling fine now. Was in [place] for a period of time.   Needs refill of valacyclovir-for HSV.  Uses as needed.  States she usually gets better after 1 pill.  Previously had left thumb nodule which she massaged and improved.  Did not need to follow-up with occupational or physical therapy.      ROS:  No recent fevers or chills. No nausea or vomiting. No diarrhea. No chest pains or shortness of breath. No lower extremity edema.    Medications Ordered Prior to Encounter[1]    Allergies[2]    Patient Active Problem List    Diagnosis Date Noted    Posterior vitreous detachment of left eye 04/21/2025    Retinal drusen of both eyes 04/21/2025    Irregular astigmatism of both eyes 04/21/2025    History of osteoporosis 02/03/2025    Osteopenia of lumbar spine 02/03/2025    Hiatal hernia 05/17/2024    History of colon polyps 05/16/2024    Chronic obstructive pulmonary disease, unspecified (HCC) 04/11/2024    Hypertension 04/11/2024    Gastro-esophageal reflux disease without esophagitis 04/11/2024    Atherosclerosis of aorta (HCC) 03/13/2024    Post concussive syndrome 12/01/2023    Mild cognitive impairment 12/01/2023    Ophthalmoplegia 08/14/2023    Age-related nuclear cataract of both eyes 08/14/2023    Glaucoma suspect 08/14/2023    Concussion with no loss of consciousness 06/01/2023    Diplopia 06/01/2023    Intractable neck and back pain due to muscle spasms  "12/28/2022    Bilateral foot pain 05/17/2022    Scalp irritation 05/17/2022    Gastroesophageal reflux disease without esophagitis 09/10/2021    Generalized abdominal pain 12/21/2020    Sue's esophagus without dysplasia 08/25/2020    Urge incontinence of urine 04/12/2019    Hair pulling 03/12/2019    Essential hypertension 03/12/2019    Age-related osteoporosis without current pathological fracture 03/12/2019    Dyslipidemia 03/12/2019    Benign neoplasm of ascending colon 06/26/2018    Benign neoplasm of cecum 06/26/2018       Past Medical History[3]      OBJECTIVE:   /72 (BP Location: Left arm, Patient Position: Sitting, BP Cuff Size: Adult)   Pulse 85   Temp 36.4 °C (97.5 °F) (Temporal)   Resp 15   Ht 1.575 m (5' 2\")   Wt 60.3 kg (133 lb)   SpO2 97%   BMI 24.33 kg/m²   General: Well-developed well-nourished female, no acute distress  Neck: supple, no lymphadenopathy- cervical or supraclavicular, no thyromegaly  Cardiovascular: regular rate and rhythm, no murmurs, gallops, rubs  Lungs: clear to auscultation bilaterally, no wheezes, crackles, or rhonchi  Abdomen: +bowel sounds, soft, nontender, nondistended, no rebound, no guarding, no hepatosplenomegaly  Extremities: no cyanosis, clubbing, edema  Skin: Warm and dry  Psych: appropriate mood and affect      ASSESSMENT/PLAN:    69 y.o.female       1. Decreased energy-improved.  Follow-up as needed if any recurrent symptoms.       2. Dizziness-improved.  Follow-up as needed if any recurrent symptoms.       3. Herpes labialis-stable.  Medication as needed. valACYclovir (VALTREX) 500 MG Tab      4. Genital herpes simplex, unspecified site-stable.  Medication as needed. valACYclovir (VALTREX) 500 MG Tab      5. Essential hypertension-blood pressure stable.  Continue amlodipine 2.5 mg daily.       6. Elevated hemoglobin A1c   Monitor labs in future.  Continue healthy diet and routine exercise as tolerated. Comp Metabolic Panel    HEMOGLOBIN A1C      7. " Leukopenia, unspecified type   Monitor in future. CBC WITH DIFFERENTIAL      8. Dyslipidemia   Continue atorvastatin 10 mg nightly.  Continue healthy diet and routine exercise as tolerated.  Monitor labs in future. Lipid Profile      9. Gum inflammation   She will plan to follow-up with specialist as scheduled.       10. Encounter for screening mammogram for malignant neoplasm of breast  MA-SCREENING MAMMO BILAT W/TOMOSYNTHESIS W/CAD     11.     Neck nodule-resolved.  Follow-up if any recurrent symptoms.   Plan for follow-up after lab work complete in 4 to 6 months.  Follow-up sooner as needed.      This medical record contains text that has been entered with the assistance of computer voice recognition and dictation software.  Therefore, it may contain unintended errors in text, spelling, punctuation, or grammar.                                   [1]   Current Outpatient Medications on File Prior to Visit   Medication Sig Dispense Refill    fluticasone (FLONASE) 50 MCG/ACT nasal spray ADMINISTER 2 SPRAYS INTO AFFECTED NOSTRIL(S) EVERY DAY 48 mL 1    raloxifene (EVISTA) 60 MG Tab Take 1 Tablet by mouth every day.      sertraline (ZOLOFT) 50 MG Tab Take 1 Tablet by mouth every day. 90 Tablet 3    valACYclovir (VALTREX) 500 MG Tab Take 1 Tablet by mouth 2 times a day. 6 Tablet 3    atorvastatin (LIPITOR) 10 MG Tab Take 1 Tablet by mouth every evening. 100 Tablet 3    amLODIPine (NORVASC) 2.5 MG Tab Take 1 Tablet by mouth every day. 90 Tablet 3    raloxifene (EVISTA) 60 MG Tab TAKE 1 TABLET BY MOUTH EVERY DAY 90 Tablet 3    VITAMIN D PO Take  by mouth.      VITAMIN E PO Take  by mouth.      lidocaine (LIDODERM) 5 % Patch Place 1 Patch on the skin every 24 hours as needed (back pain). 10 Patch 3    polyethylene glycol/lytes (MIRALAX) 17 g Pack Take 17 g by mouth every day.      triamcinolone acetonide (KENALOG) 0.1 % Cream Apply 1 Application topically 2 times a day. 45 g 0    hydrocortisone rectal (ANUSOL-HC) 2.5%  Cream Insert 1 Application into the rectum 2 times a day. 28 g 0    TURMERIC PO Take 2 Tablets by mouth every morning.      Cholecalciferol (VITAMIN D-3) 125 MCG (5000 UT) Tab Take 5,000 Units by mouth every 7 days. On Monday       No current facility-administered medications on file prior to visit.   [2]   Allergies  Allergen Reactions    Sulfa Drugs Rash and Unspecified     7/15/19 welting urticaria/rash after taking Bactrim     Penicillins Itching and Unspecified     Blisters   [3]   Past Medical History:  Diagnosis Date    Allergy     Arthritis     ASTHMA     remote hx    Bowel habit changes     Chest discomfort 04/2019    Coronary CT scan with LAD 11.4.     COPD     mild, not on meds for it    GERD (gastroesophageal reflux disease)     Hyperlipidemia     Hypertension     IBD (inflammatory bowel disease)     OSTEOPOROSIS     Osteoporosis 04/11/2024    Shortness of breath 05/2019    Echocardiogram with normal LV size, LVEF 65%.Trace MR. Mild TR. RVSP 30mmHg.    Traumatic head injury less than 3 months ago 12/29/2022

## (undated) DEVICE — SODIUM CHL IRRIGATION 0.9% 1000ML (12EA/CA)

## (undated) DEVICE — CANISTER SUCTION 3000ML MECHANICAL FILTER AUTO SHUTOFF MEDI-VAC NONSTERILE LF DISP  (40EA/CA)

## (undated) DEVICE — DRAPE LAPAROTOMY T SHEET - (12EA/CA)

## (undated) DEVICE — SPONGE GAUZESTER 4 X 4 4PLY - (128PK/CA)

## (undated) DEVICE — TRAY SKIN SCRUB PVP WET (20EA/CA) PART #DYND70356 DISCONTINUED

## (undated) DEVICE — VESSELOOP MAXI BLUE STERILE- SURG-I-LOOP (10EA/BX)

## (undated) DEVICE — SUTURE 3-0 VICRYL PLUS SH - 27 INCH (36/BX)

## (undated) DEVICE — CATHETER IV 18 GA X 1-3/4 ---SURG.& SDS ONLY---

## (undated) DEVICE — SLEEVE, VASO, THIGH, MED

## (undated) DEVICE — ELECTRODE DUAL RETURN W/ CORD - (50/PK)

## (undated) DEVICE — GOWN WARMING STANDARD FLEX - (30/CA)

## (undated) DEVICE — CATHETER IV 14 GA X 2 ---SURG.& SDS ONLY---(200EA/CA)

## (undated) DEVICE — BRIEF STRETCH MATERNITY M/L - FITS 20-60IN (5EA/BG 20BG/CA)

## (undated) DEVICE — DRESSING ABDOMINAL PAD STERILE 8 X 10" (360EA/CA)"

## (undated) DEVICE — TOWEL STOP TIMEOUT SAFETY FLAG (40EA/CA)

## (undated) DEVICE — BLADE SURGICAL #15 - (50/BX 3BX/CA)

## (undated) DEVICE — KIT ANESTHESIA W/CIRCUIT & 3/LT BAG W/FILTER (20EA/CA)

## (undated) DEVICE — LIGASURE TISSUE FUSION  - SINGLE USE (6/CA)

## (undated) DEVICE — LACTATED RINGERS INJ 1000 ML - (14EA/CA 60CA/PF)

## (undated) DEVICE — ELECTRODE 850 FOAM ADHESIVE - HYDROGEL RADIOTRNSPRNT (50/PK)

## (undated) DEVICE — SUTURE GENERAL

## (undated) DEVICE — HEAD HOLDER JUNIOR/ADULT

## (undated) DEVICE — HEMOSTAT SURG ABSORBABLE - 4 X 8 IN SURGICEL (24EA/CA)

## (undated) DEVICE — SET LEADWIRE 5 LEAD BEDSIDE DISPOSABLE ECG (1SET OF 5/EA)

## (undated) DEVICE — ARMREST CRADLE FOAM - (2PR/PK 12PR/CA)

## (undated) DEVICE — KIT SIGMOIDOSCOPE W/BULB AND - SUCTION (1/PK 10PK/CA)

## (undated) DEVICE — GAUZE PACKING STRIP PLAIN STERILE - 1 IN X 5 YD (12/CA)

## (undated) DEVICE — SUTURE 2-0 VICRYL PLUS SH - 8 X 18 INCH (12/BX)

## (undated) DEVICE — SENSOR SPO2 NEO LNCS ADHESIVE (20/BX) SEE USER NOTES

## (undated) DEVICE — GLOVE BIOGEL PI INDICATOR SZ 7.5 SURGICAL PF LF -(50/BX 4BX/CA)

## (undated) DEVICE — BOVIE  BLADE 6 EXTENDED - (50/PK)

## (undated) DEVICE — GLOVE SZ 7.5 BIOGEL PI MICRO - PF LF (50PR/BX)

## (undated) DEVICE — MASK ANESTHESIA ADULT  - (100/CA)

## (undated) DEVICE — PACK MINOR BASIN - (2EA/CA)

## (undated) DEVICE — NEPTUNE 4 PORT MANIFOLD - (20/PK)

## (undated) DEVICE — GLOVE SZ 6.5 BIOGEL PI MICRO - PF LF (50PR/BX)

## (undated) DEVICE — SET EXTENSION WITH 2 PORTS (48EA/CA) ***PART #2C8610 IS A SUBSTITUTE*****

## (undated) DEVICE — PROTECTOR ULNA NERVE - (36PR/CA)

## (undated) DEVICE — GLOVE BIOGEL PI INDICATOR SZ 6.5 SURGICAL PF LF - (50/BX 4BX/CA)

## (undated) DEVICE — GAUZE FLUFF STERILE 2-PLY 36 X 36 (100EA/CA)

## (undated) DEVICE — SUCTION INSTRUMENT YANKAUER BULBOUS TIP W/O VENT (50EA/CA)

## (undated) DEVICE — TUBING CLEARLINK DUO-VENT - C-FLO (48EA/CA)